# Patient Record
Sex: MALE | Race: BLACK OR AFRICAN AMERICAN | Employment: OTHER | URBAN - METROPOLITAN AREA
[De-identification: names, ages, dates, MRNs, and addresses within clinical notes are randomized per-mention and may not be internally consistent; named-entity substitution may affect disease eponyms.]

---

## 2017-04-18 ENCOUNTER — HOSPITAL ENCOUNTER (OUTPATIENT)
Dept: GENERAL RADIOLOGY | Age: 57
Discharge: HOME | End: 2017-04-18
Attending: INTERNAL MEDICINE | Admitting: INTERNAL MEDICINE

## 2017-04-18 DIAGNOSIS — R13.10 PROBLEMS WITH SWALLOWING AND MASTICATION: ICD-10-CM

## 2017-04-18 DIAGNOSIS — R13.10 DYSPHAGIA: ICD-10-CM

## 2017-04-27 ENCOUNTER — HOSPITAL ENCOUNTER (OUTPATIENT)
Dept: ENDOSCOPY | Age: 57
Discharge: HOME | End: 2017-04-27
Attending: INTERNAL MEDICINE | Admitting: INTERNAL MEDICINE

## 2017-05-15 PROBLEM — C15.5 MALIGNANT NEOPLASM OF LOWER THIRD OF ESOPHAGUS (HCC): Status: ACTIVE | Noted: 2017-05-15

## 2017-05-16 ENCOUNTER — HOSPITAL ENCOUNTER (OUTPATIENT)
Dept: MRI IMAGING | Age: 57
Discharge: HOME | End: 2017-05-16
Attending: INTERNAL MEDICINE | Admitting: INTERNAL MEDICINE

## 2017-05-16 DIAGNOSIS — C15.5 MALIGNANT NEOPLASM OF LOWER THIRD OF ESOPHAGUS (HCC): ICD-10-CM

## 2017-05-16 DIAGNOSIS — K76.9 LIVER DISEASE: ICD-10-CM

## 2017-05-16 DIAGNOSIS — K76.9 HEPATIC LESION: ICD-10-CM

## 2017-05-17 ENCOUNTER — HOSPITAL ENCOUNTER (OUTPATIENT)
Dept: ENDOSCOPY | Age: 57
Discharge: HOME | End: 2017-05-17
Attending: INTERNAL MEDICINE | Admitting: INTERNAL MEDICINE

## 2017-05-17 VITALS
HEART RATE: 67 BPM | TEMPERATURE: 97 F | RESPIRATION RATE: 16 BRPM | SYSTOLIC BLOOD PRESSURE: 126 MMHG | OXYGEN SATURATION: 100 % | DIASTOLIC BLOOD PRESSURE: 76 MMHG

## 2017-05-17 RX ORDER — SODIUM CHLORIDE, SODIUM LACTATE, POTASSIUM CHLORIDE, CALCIUM CHLORIDE 600; 310; 30; 20 MG/100ML; MG/100ML; MG/100ML; MG/100ML
INJECTION, SOLUTION INTRAVENOUS CONTINUOUS
Status: DISCONTINUED | OUTPATIENT
Start: 2017-05-17 | End: 2017-05-17

## 2017-05-17 RX ORDER — SODIUM CHLORIDE 9 MG/ML
INJECTION, SOLUTION INTRAVENOUS CONTINUOUS
Status: DISCONTINUED | OUTPATIENT
Start: 2017-05-17 | End: 2017-05-18 | Stop reason: HOSPADM

## 2017-05-17 RX ORDER — SODIUM CHLORIDE 0.9 % (FLUSH) 0.9 %
10 SYRINGE (ML) INJECTION EVERY 12 HOURS SCHEDULED
Status: DISCONTINUED | OUTPATIENT
Start: 2017-05-17 | End: 2017-05-18 | Stop reason: HOSPADM

## 2017-05-17 RX ORDER — SODIUM CHLORIDE 0.9 % (FLUSH) 0.9 %
10 SYRINGE (ML) INJECTION PRN
Status: DISCONTINUED | OUTPATIENT
Start: 2017-05-17 | End: 2017-05-18 | Stop reason: HOSPADM

## 2017-05-17 ASSESSMENT — PAIN - FUNCTIONAL ASSESSMENT: PAIN_FUNCTIONAL_ASSESSMENT: 0-10

## 2017-06-05 PROBLEM — R11.2 CINV (CHEMOTHERAPY-INDUCED NAUSEA AND VOMITING): Status: ACTIVE | Noted: 2017-06-05

## 2017-06-05 PROBLEM — T45.1X5A CINV (CHEMOTHERAPY-INDUCED NAUSEA AND VOMITING): Status: ACTIVE | Noted: 2017-06-05

## 2017-06-22 PROBLEM — Z51.12 ENCOUNTER FOR ANTINEOPLASTIC CHEMOTHERAPY AND IMMUNOTHERAPY: Status: ACTIVE | Noted: 2017-06-22

## 2017-06-22 PROBLEM — Z51.11 ENCOUNTER FOR ANTINEOPLASTIC CHEMOTHERAPY AND IMMUNOTHERAPY: Status: ACTIVE | Noted: 2017-06-22

## 2017-12-22 ENCOUNTER — HOSPITAL ENCOUNTER (OUTPATIENT)
Dept: CT IMAGING | Age: 57
Discharge: HOME | End: 2017-12-22
Attending: INTERNAL MEDICINE | Admitting: INTERNAL MEDICINE

## 2017-12-22 DIAGNOSIS — C15.5 ADENOCARCINOMA OF LOWER ESOPHAGUS (HCC): ICD-10-CM

## 2017-12-22 DIAGNOSIS — C15.5 MALIGNANT NEOPLASM OF LOWER THIRD OF ESOPHAGUS (HCC): ICD-10-CM

## 2018-01-03 ENCOUNTER — HOSPITAL ENCOUNTER (OUTPATIENT)
Dept: CT IMAGING | Age: 58
Discharge: HOME | End: 2018-01-03
Attending: INTERNAL MEDICINE | Admitting: INTERNAL MEDICINE

## 2018-01-03 VITALS
HEIGHT: 71 IN | BODY MASS INDEX: 21.98 KG/M2 | WEIGHT: 157 LBS | SYSTOLIC BLOOD PRESSURE: 105 MMHG | RESPIRATION RATE: 16 BRPM | TEMPERATURE: 98.7 F | DIASTOLIC BLOOD PRESSURE: 70 MMHG | OXYGEN SATURATION: 95 % | HEART RATE: 61 BPM

## 2018-01-03 DIAGNOSIS — C15.5 MALIGNANT NEOPLASM OF ABDOMINAL ESOPHAGUS (HCC): ICD-10-CM

## 2018-01-03 RX ORDER — FENTANYL CITRATE 50 UG/ML
INJECTION, SOLUTION INTRAMUSCULAR; INTRAVENOUS
Status: COMPLETED | OUTPATIENT
Start: 2018-01-03 | End: 2018-01-03

## 2018-01-03 RX ORDER — MIDAZOLAM HYDROCHLORIDE 1 MG/ML
INJECTION INTRAMUSCULAR; INTRAVENOUS
Status: COMPLETED | OUTPATIENT
Start: 2018-01-03 | End: 2018-01-03

## 2018-01-03 RX ORDER — ACETAMINOPHEN 325 MG/1
650 TABLET ORAL EVERY 4 HOURS PRN
Status: DISCONTINUED | OUTPATIENT
Start: 2018-01-03 | End: 2018-01-04 | Stop reason: HOSPADM

## 2018-01-03 RX ADMIN — FENTANYL CITRATE 50 MCG: 50 INJECTION, SOLUTION INTRAMUSCULAR; INTRAVENOUS at 11:15

## 2018-01-03 RX ADMIN — MIDAZOLAM HYDROCHLORIDE 1 MG: 1 INJECTION INTRAMUSCULAR; INTRAVENOUS at 11:15

## 2018-01-03 RX ADMIN — FENTANYL CITRATE 50 MCG: 50 INJECTION, SOLUTION INTRAMUSCULAR; INTRAVENOUS at 11:19

## 2018-01-03 RX ADMIN — MIDAZOLAM HYDROCHLORIDE 1 MG: 1 INJECTION INTRAMUSCULAR; INTRAVENOUS at 11:19

## 2018-01-03 ASSESSMENT — PAIN - FUNCTIONAL ASSESSMENT: PAIN_FUNCTIONAL_ASSESSMENT: 0-10

## 2018-01-03 NOTE — PROGRESS NOTES
Discharge instructions reviewed and understanding verbalized per pt/family with copy given. All home medications/new prescriptions have been reviewed, questions answered and patient/family state understanding.  Medication information sheet provided for new prescriptions received when applicable

## 2018-01-03 NOTE — PROGRESS NOTES
Patient denies pain/needs, vss, drsg to bx site c/d/i, call placed to Dr. Rosalie Langston regarding patient flying on Friday for a , MD noted that it is ok for patient to fly and that the bx was not in his lung, stated it was a mass next to his, patient and wife notified, patient states he is ready for d/c

## 2018-02-02 ENCOUNTER — PRE-PROCEDURE TELEPHONE (OUTPATIENT)
Dept: INTERVENTIONAL RADIOLOGY/VASCULAR | Age: 58
End: 2018-02-02

## 2018-02-06 ENCOUNTER — HOSPITAL ENCOUNTER (OUTPATIENT)
Dept: NON INVASIVE DIAGNOSTICS | Age: 58
Discharge: HOME | End: 2018-02-06
Attending: NURSE PRACTITIONER | Admitting: NURSE PRACTITIONER

## 2018-02-06 DIAGNOSIS — Z51.11 ENCOUNTER FOR ANTINEOPLASTIC CHEMOTHERAPY: ICD-10-CM

## 2018-02-06 LAB
LV EF: 55 %
LVEF MODALITY: NORMAL

## 2018-02-08 ENCOUNTER — HOSPITAL ENCOUNTER (OUTPATIENT)
Dept: INTERVENTIONAL RADIOLOGY/VASCULAR | Age: 58
Discharge: HOME | End: 2018-02-08
Attending: INTERNAL MEDICINE | Admitting: INTERNAL MEDICINE

## 2018-02-08 VITALS
OXYGEN SATURATION: 99 % | HEART RATE: 72 BPM | RESPIRATION RATE: 12 BRPM | SYSTOLIC BLOOD PRESSURE: 111 MMHG | TEMPERATURE: 98.8 F | DIASTOLIC BLOOD PRESSURE: 63 MMHG

## 2018-02-08 DIAGNOSIS — C15.9 MALIGNANT NEOPLASM OF ESOPHAGUS, UNSPECIFIED LOCATION (HCC): ICD-10-CM

## 2018-02-08 DIAGNOSIS — Z51.11 ENCOUNTER FOR ANTINEOPLASTIC CHEMOTHERAPY: ICD-10-CM

## 2018-02-08 RX ORDER — OXYCODONE HYDROCHLORIDE AND ACETAMINOPHEN 5; 325 MG/1; MG/1
2 TABLET ORAL EVERY 4 HOURS PRN
Status: DISCONTINUED | OUTPATIENT
Start: 2018-02-08 | End: 2018-02-09 | Stop reason: HOSPADM

## 2018-02-08 RX ORDER — MIDAZOLAM HYDROCHLORIDE 1 MG/ML
INJECTION INTRAMUSCULAR; INTRAVENOUS
Status: COMPLETED | OUTPATIENT
Start: 2018-02-08 | End: 2018-02-08

## 2018-02-08 RX ORDER — ONDANSETRON 2 MG/ML
4 INJECTION INTRAMUSCULAR; INTRAVENOUS EVERY 8 HOURS PRN
Status: DISCONTINUED | OUTPATIENT
Start: 2018-02-08 | End: 2018-02-09 | Stop reason: HOSPADM

## 2018-02-08 RX ORDER — ACETAMINOPHEN 325 MG/1
650 TABLET ORAL EVERY 4 HOURS PRN
Status: DISCONTINUED | OUTPATIENT
Start: 2018-02-08 | End: 2018-02-09 | Stop reason: HOSPADM

## 2018-02-08 RX ORDER — OXYCODONE HYDROCHLORIDE AND ACETAMINOPHEN 5; 325 MG/1; MG/1
1 TABLET ORAL EVERY 4 HOURS PRN
Status: DISCONTINUED | OUTPATIENT
Start: 2018-02-08 | End: 2018-02-09 | Stop reason: HOSPADM

## 2018-02-08 RX ORDER — FENTANYL CITRATE 50 UG/ML
INJECTION, SOLUTION INTRAMUSCULAR; INTRAVENOUS
Status: COMPLETED | OUTPATIENT
Start: 2018-02-08 | End: 2018-02-08

## 2018-02-08 RX ADMIN — MIDAZOLAM HYDROCHLORIDE 1 MG: 1 INJECTION INTRAMUSCULAR; INTRAVENOUS at 12:56

## 2018-02-08 RX ADMIN — MIDAZOLAM HYDROCHLORIDE 0.5 MG: 1 INJECTION INTRAMUSCULAR; INTRAVENOUS at 13:02

## 2018-02-08 RX ADMIN — MIDAZOLAM HYDROCHLORIDE 1 MG: 1 INJECTION INTRAMUSCULAR; INTRAVENOUS at 12:52

## 2018-02-08 RX ADMIN — FENTANYL CITRATE 50 MCG: 50 INJECTION, SOLUTION INTRAMUSCULAR; INTRAVENOUS at 12:52

## 2018-02-08 RX ADMIN — FENTANYL CITRATE 25 MCG: 50 INJECTION, SOLUTION INTRAMUSCULAR; INTRAVENOUS at 13:02

## 2018-02-08 RX ADMIN — FENTANYL CITRATE 50 MCG: 50 INJECTION, SOLUTION INTRAMUSCULAR; INTRAVENOUS at 12:56

## 2018-02-08 NOTE — PROGRESS NOTES
Discussed d/c instructions with pt and friend/family at bedside. Verbalized understanding. Provided pt/family with copy of instructions. Pt wants to eat now prior to d/c. Feels weak.

## 2018-02-08 NOTE — PRE SEDATION
been reviewed (see flow sheet for vitals). I have reviewed the patient's history and review of systems.     Mallampati Airway Assessment:  normal    Prior History of Anesthesia Complications:   none    ASA Classification:  Class 1 - A normal healthy patient    Sedation/ Anesthesia Plan:   intravenous sedation    Medications Planned:   midazolam (Versed) intravenously and fentanyl intravenously    Patient is an appropriate candidate for plan of sedation: yes    Electronically signed by Darrell Love MD on 2/8/2018 at 12:48 PM

## 2018-04-06 PROBLEM — K92.0 HEMATEMESIS: Status: ACTIVE | Noted: 2018-04-06

## 2018-06-01 ENCOUNTER — HOSPITAL ENCOUNTER (OUTPATIENT)
Dept: CT IMAGING | Age: 58
Discharge: HOME | End: 2018-06-01
Attending: INTERNAL MEDICINE | Admitting: INTERNAL MEDICINE

## 2018-06-01 DIAGNOSIS — C15.5 MALIGNANT NEOPLASM OF LOWER THIRD OF ESOPHAGUS (HCC): ICD-10-CM

## 2018-06-01 DIAGNOSIS — C15.5 MALIGNANT NEOPLASM OF ABDOMINAL ESOPHAGUS (HCC): ICD-10-CM

## 2018-09-10 ENCOUNTER — HOSPITAL ENCOUNTER (OUTPATIENT)
Dept: CT IMAGING | Age: 58
Discharge: HOME | End: 2018-09-10
Attending: INTERNAL MEDICINE | Admitting: INTERNAL MEDICINE

## 2018-09-10 DIAGNOSIS — Z51.11 ENCOUNTER FOR ANTINEOPLASTIC CHEMOTHERAPY: ICD-10-CM

## 2018-09-10 DIAGNOSIS — C15.9 MALIGNANT NEOPLASM OF ESOPHAGUS, UNSPECIFIED LOCATION (HCC): ICD-10-CM

## 2018-09-10 LAB
LV EF: 55 %
LVEF MODALITY: NORMAL

## 2019-01-23 ENCOUNTER — HOSPITAL ENCOUNTER (OUTPATIENT)
Dept: CT IMAGING | Age: 59
Discharge: HOME | End: 2019-01-23
Payer: COMMERCIAL

## 2019-01-23 DIAGNOSIS — C15.9 MALIGNANT NEOPLASM OF ESOPHAGUS, UNSPECIFIED LOCATION (HCC): ICD-10-CM

## 2019-01-23 PROCEDURE — 74177 CT ABD & PELVIS W/CONTRAST: CPT

## 2019-01-23 PROCEDURE — 6360000004 HC RX CONTRAST MEDICATION: Performed by: INTERNAL MEDICINE

## 2019-01-23 RX ADMIN — IOPAMIDOL 75 ML: 755 INJECTION, SOLUTION INTRAVENOUS at 09:52

## 2019-01-23 RX ADMIN — IOHEXOL 50 ML: 240 INJECTION, SOLUTION INTRATHECAL; INTRAVASCULAR; INTRAVENOUS; ORAL at 09:52

## 2019-01-30 ENCOUNTER — HOSPITAL ENCOUNTER (OUTPATIENT)
Dept: MRI IMAGING | Age: 59
Discharge: HOME | End: 2019-01-30
Payer: COMMERCIAL

## 2019-01-30 DIAGNOSIS — C15.5 MALIGNANT TUMOR OF LOWER THIRD OF ESOPHAGUS (HCC): ICD-10-CM

## 2019-01-30 PROCEDURE — 2580000003 HC RX 258: Performed by: INTERNAL MEDICINE

## 2019-01-30 PROCEDURE — A9579 GAD-BASE MR CONTRAST NOS,1ML: HCPCS | Performed by: INTERNAL MEDICINE

## 2019-01-30 PROCEDURE — 6360000004 HC RX CONTRAST MEDICATION: Performed by: INTERNAL MEDICINE

## 2019-01-30 PROCEDURE — 74183 MRI ABD W/O CNTR FLWD CNTR: CPT

## 2019-01-30 RX ORDER — 0.9 % SODIUM CHLORIDE 0.9 %
10 VIAL (ML) INJECTION
Status: COMPLETED | OUTPATIENT
Start: 2019-01-30 | End: 2019-01-30

## 2019-01-30 RX ADMIN — GADOTERIDOL 14 ML: 279.3 INJECTION, SOLUTION INTRAVENOUS at 16:56

## 2019-01-30 RX ADMIN — Medication 20 ML: at 16:57

## 2019-05-23 ENCOUNTER — HOSPITAL ENCOUNTER (OUTPATIENT)
Dept: CT IMAGING | Age: 59
Discharge: HOME | End: 2019-05-23
Payer: COMMERCIAL

## 2019-05-23 DIAGNOSIS — C15.5 MALIGNANT NEOPLASM OF LOWER THIRD OF ESOPHAGUS (HCC): ICD-10-CM

## 2019-05-23 PROCEDURE — 6360000004 HC RX CONTRAST MEDICATION: Performed by: INTERNAL MEDICINE

## 2019-05-23 PROCEDURE — 74177 CT ABD & PELVIS W/CONTRAST: CPT

## 2019-05-23 RX ADMIN — IOHEXOL 50 ML: 240 INJECTION, SOLUTION INTRATHECAL; INTRAVASCULAR; INTRAVENOUS; ORAL at 15:07

## 2019-05-23 RX ADMIN — IOPAMIDOL 75 ML: 755 INJECTION, SOLUTION INTRAVENOUS at 15:07

## 2019-09-09 ENCOUNTER — HOSPITAL ENCOUNTER (OUTPATIENT)
Dept: NON INVASIVE DIAGNOSTICS | Age: 59
Discharge: HOME | End: 2019-09-09
Payer: COMMERCIAL

## 2019-09-09 LAB
LV EF: 58 %
LVEF MODALITY: NORMAL

## 2019-09-09 PROCEDURE — 93306 TTE W/DOPPLER COMPLETE: CPT

## 2019-12-13 ENCOUNTER — HOSPITAL ENCOUNTER (OUTPATIENT)
Dept: CT IMAGING | Age: 59
Discharge: HOME | End: 2019-12-13
Payer: COMMERCIAL

## 2019-12-13 DIAGNOSIS — C15.5 MALIGNANT NEOPLASM OF LOWER THIRD OF ESOPHAGUS (HCC): ICD-10-CM

## 2019-12-13 PROCEDURE — 74177 CT ABD & PELVIS W/CONTRAST: CPT

## 2019-12-13 PROCEDURE — 6360000004 HC RX CONTRAST MEDICATION: Performed by: INTERNAL MEDICINE

## 2019-12-13 RX ADMIN — IOPAMIDOL 75 ML: 755 INJECTION, SOLUTION INTRAVENOUS at 17:34

## 2019-12-13 RX ADMIN — IOHEXOL 50 ML: 240 INJECTION, SOLUTION INTRATHECAL; INTRAVASCULAR; INTRAVENOUS; ORAL at 17:34

## 2020-06-11 ENCOUNTER — HOSPITAL ENCOUNTER (OUTPATIENT)
Dept: NON INVASIVE DIAGNOSTICS | Age: 60
Discharge: HOME | End: 2020-06-11
Payer: COMMERCIAL

## 2020-06-11 ENCOUNTER — HOSPITAL ENCOUNTER (OUTPATIENT)
Dept: CT IMAGING | Age: 60
Discharge: HOME | End: 2020-06-11
Payer: COMMERCIAL

## 2020-06-11 LAB
A/G RATIO: 1.2 (ref 1.1–2.2)
ALBUMIN SERPL-MCNC: 3 G/DL (ref 3.4–5)
ALP BLD-CCNC: 72 U/L (ref 40–129)
ALT SERPL-CCNC: 15 U/L (ref 10–40)
ANION GAP SERPL CALCULATED.3IONS-SCNC: 8 MMOL/L (ref 3–16)
AST SERPL-CCNC: 29 U/L (ref 15–37)
BILIRUB SERPL-MCNC: <0.2 MG/DL (ref 0–1)
BUN BLDV-MCNC: 13 MG/DL (ref 7–20)
CALCIUM SERPL-MCNC: 6.7 MG/DL (ref 8.3–10.6)
CHLORIDE BLD-SCNC: 113 MMOL/L (ref 99–110)
CO2: 20 MMOL/L (ref 21–32)
CREAT SERPL-MCNC: 1.1 MG/DL (ref 0.8–1.3)
GFR AFRICAN AMERICAN: >60
GFR NON-AFRICAN AMERICAN: >60
GLOBULIN: 2.5 G/DL
GLUCOSE BLD-MCNC: 86 MG/DL (ref 70–99)
LV EF: 60 %
LVEF MODALITY: NORMAL
POTASSIUM SERPL-SCNC: 3.1 MMOL/L (ref 3.5–5.1)
SODIUM BLD-SCNC: 141 MMOL/L (ref 136–145)
TOTAL PROTEIN: 5.5 G/DL (ref 6.4–8.2)

## 2020-06-11 PROCEDURE — 36415 COLL VENOUS BLD VENIPUNCTURE: CPT

## 2020-06-11 PROCEDURE — 93306 TTE W/DOPPLER COMPLETE: CPT

## 2020-06-11 PROCEDURE — 80053 COMPREHEN METABOLIC PANEL: CPT

## 2020-06-11 PROCEDURE — 74177 CT ABD & PELVIS W/CONTRAST: CPT

## 2020-06-11 PROCEDURE — 6360000004 HC RX CONTRAST MEDICATION: Performed by: INTERNAL MEDICINE

## 2020-06-11 RX ADMIN — IOHEXOL 50 ML: 240 INJECTION, SOLUTION INTRATHECAL; INTRAVASCULAR; INTRAVENOUS; ORAL at 17:34

## 2020-06-11 RX ADMIN — IOPAMIDOL 75 ML: 755 INJECTION, SOLUTION INTRAVENOUS at 17:34

## 2020-06-22 ENCOUNTER — TELEPHONE (OUTPATIENT)
Dept: FAMILY MEDICINE CLINIC | Age: 60
End: 2020-06-22

## 2020-06-22 ENCOUNTER — OFFICE VISIT (OUTPATIENT)
Dept: PRIMARY CARE CLINIC | Age: 60
End: 2020-06-22
Payer: COMMERCIAL

## 2020-06-22 VITALS — HEART RATE: 73 BPM | OXYGEN SATURATION: 98 % | TEMPERATURE: 98.3 F

## 2020-06-22 PROCEDURE — 99213 OFFICE O/P EST LOW 20 MIN: CPT | Performed by: NURSE PRACTITIONER

## 2020-06-22 NOTE — TELEPHONE ENCOUNTER
Pt informed to JAGUAR w/PCP  Your patient was seen at the [address] today. A follow up virtual visit with the patient's PCP should be completed in 48 hours. Please schedule the patient for this follow-up. Thank you.

## 2020-06-24 LAB
SARS-COV-2: NOT DETECTED
SOURCE: NORMAL

## 2020-06-25 NOTE — RESULT ENCOUNTER NOTE
Please contact patient with their testing results: Your test for COVID-19, also known as novel coronavirus, came back negative. No virus was detected from the sample collected. Until your symptoms are fully resolved, you may still be contagious. We recommend that you remain isolated for 7 days minimum or 72 hours after your symptoms have completely resolved, whichever is longer. Continually monitor symptoms. Contact a medical provider if symptoms are worsening. If you have any additional questions, contact your PCP.     For additional information, please visit the Centers for Disease Control and Prevention   [email or web address]

## 2020-09-03 RX ORDER — FERROUS SULFATE 325(65) MG
325 TABLET ORAL
COMMUNITY

## 2020-09-03 RX ORDER — SILDENAFIL 100 MG/1
TABLET, FILM COATED ORAL EVERY MORNING
COMMUNITY

## 2020-09-11 NOTE — PROGRESS NOTES
Late entry 9/3/2020    Name_______________________________________Printed:____________________  Date and time of surgery__9/21 1100______________________Arrival Time:__0900______________   1. The instructions given regarding when and if a patient needs to stop oral intake prior to surgery varies. Follow the specific instructions you were given                  __x_Nothing to eat or to drink after Midnight the night before.                             ____Endoscopy patient follow your DRS instructions-generally you will be doing a part of the prep after Midnight                   ____Carbo loading or ERAS instructions will be given to select patients-if you have been given those instructions -please do the following                           The evening before your surgery after dinner before midnight drink 40 ounces of gatorade. If you are diabetic use sugar free. The morning of surgery drink 40 ounces of water. This needs to be finished 3 hours prior to your surgery start time. 2. Take the following pills with a small sip of water on the morning of surgery___________________________________________________                  Do not take blood pressure medications ending in pril or sartan the aranza prior to surgery or the morning of surgery_   3. Aspirin, Ibuprofen, Advil, Naproxen, Vitamin E and other Anti-inflammatory products and supplements should be stopped for 5 -7days before surgery or as directed by your physician. 4. Check with your Doctor regarding stopping Plavix, Coumadin,Eliquis, Lovenox,Effient,Pradaxa,Xarelto, Fragmin or other blood thinners and follow their instructions. 5. Do not smoke, and do not drink any alcoholic beverages 24 hours prior to surgery. This includes NA Beer. Refrain from the usage of any recreational drugs. 6. You may brush your teeth and gargle the morning of surgery. DO NOT SWALLOW WATER   7.  You MUST make arrangements for a responsible adult to stay on site while you are here and take you home after your surgery. You will not be allowed to leave alone or drive yourself home. It is strongly suggested someone stay with you the first 24 hrs. Your surgery will be cancelled if you do not have a ride home. 8. A parent/legal guardian must accompany a child scheduled for surgery and plan to stay at the hospital until the child is discharged. Please do not bring other children with you. 9. Please wear simple, loose fitting clothing to the hospital.  Heena Remy not bring valuables (money, credit cards, checkbooks, etc.) Do not wear any makeup (including no eye makeup) or nail polish on your fingers or toes. 10. DO NOT wear any jewelry or piercings on day of surgery. All body piercing jewelry must be removed. 11. If you have ___dentures, they will be removed before going to the OR; we will provide you a container. If you wear ___contact lenses or ___glasses, they will be removed; please bring a case for them. 12. Please see your family doctor/pediatrician for a history & physical and/or concerning medications. Bring any test results/reports from your physician's office. PCP__________________Phone___________H&P Appt. Date________             13 If you  have a Living Will and Durable Power of  for Healthcare, please bring in a copy. 15. Notify your Surgeon if you develop any illness between now and surgery  time, cough, cold, fever, sore throat, nausea, vomiting, etc.  Please notify your surgeon if you experience dizziness, shortness of breath or blurred vision between now & the time of your surgery             15. DO NOT shave your operative site 96 hours prior to surgery. For face & neck surgery, men may use an electric razor 48 hours prior to surgery. 16. Shower the night before or morning of surgery using an antibacterial soap or as you have been instructed.              17. To provide excellent care visitors will be limited to one in the room at any given time. 18.  Please bring picture ID and insurance card. 19.  Visit our web site for additional information:  [email or web address]              20.During flu season no children under the age of 15 are permitted in the hospital for the safety of all patients. 21. If you take a long acting insulin in the evening only  take half of your usual  dose the night  before your procedure              22. If you use a c-pap please bring DOS if staying overnight,             23.For your convenience [facility] has a pharmacy on site to fill your prescriptions. 24. If you use oxygen and have a portable tank please bring it  with you the DOS             25. Bring a complete list of all your medications with name and dose include any supplements. 26. Other_Patient instructed to get their COVID-19 test done as directed by their doctor (5-7 days prior to procedure)  or patient states will get on __9/15________. Patient was notified that they need to have an appointment,number to call provided. The day the COVID test is done is considered day one. Instructed to self quarantine after test until DOS. __PATs 9/15  _______________________________________   *Please call pre admission testing if you any further questions   [address]    [facility]  [phone]   [address] [place] [address]       All above information reviewed with patient in person or by phone. Patient verbalizes understanding. All questions and concerns addressed.                                                                                                  Patient/Rep__per phone/pt__________________                                                                                                                                    PRE OP INSTRUCTIONS

## 2020-09-15 ENCOUNTER — OFFICE VISIT (OUTPATIENT)
Dept: PRIMARY CARE CLINIC | Age: 60
End: 2020-09-15
Payer: COMMERCIAL

## 2020-09-15 PROCEDURE — 99211 OFF/OP EST MAY X REQ PHY/QHP: CPT | Performed by: NURSE PRACTITIONER

## 2020-09-15 NOTE — PROGRESS NOTES
Darnell Obrien received a viral test for COVID-19. They were educated on isolation and quarantine as appropriate. For any symptoms, they were directed to seek care from their PCP, given contact information to establish with a doctor, directed to an urgent care or the emergency room.

## 2020-09-15 NOTE — PATIENT INSTRUCTIONS

## 2020-09-21 ENCOUNTER — ANESTHESIA EVENT (OUTPATIENT)
Dept: OPERATING ROOM | Age: 60
DRG: 657 | End: 2020-09-21
Payer: COMMERCIAL

## 2020-09-21 ENCOUNTER — HOSPITAL ENCOUNTER (INPATIENT)
Age: 60
LOS: 5 days | Discharge: HOME | DRG: 657 | End: 2020-09-26
Attending: UROLOGY | Admitting: UROLOGY
Payer: COMMERCIAL

## 2020-09-21 ENCOUNTER — ANESTHESIA (OUTPATIENT)
Dept: OPERATING ROOM | Age: 60
DRG: 657 | End: 2020-09-21
Payer: COMMERCIAL

## 2020-09-21 VITALS
DIASTOLIC BLOOD PRESSURE: 78 MMHG | SYSTOLIC BLOOD PRESSURE: 153 MMHG | OXYGEN SATURATION: 100 % | RESPIRATION RATE: 19 BRPM

## 2020-09-21 PROBLEM — N28.89 RENAL MASS: Status: ACTIVE | Noted: 2020-09-21

## 2020-09-21 LAB
ABO/RH: NORMAL
ANTIBODY SCREEN: NORMAL

## 2020-09-21 PROCEDURE — 6360000002 HC RX W HCPCS: Performed by: ANESTHESIOLOGY

## 2020-09-21 PROCEDURE — 3600000019 HC SURGERY ROBOT ADDTL 15MIN: Performed by: UROLOGY

## 2020-09-21 PROCEDURE — 6360000002 HC RX W HCPCS: Performed by: NURSE ANESTHETIST, CERTIFIED REGISTERED

## 2020-09-21 PROCEDURE — 0TT04ZZ RESECTION OF RIGHT KIDNEY, PERCUTANEOUS ENDOSCOPIC APPROACH: ICD-10-PCS | Performed by: UROLOGY

## 2020-09-21 PROCEDURE — 88307 TISSUE EXAM BY PATHOLOGIST: CPT

## 2020-09-21 PROCEDURE — 2500000003 HC RX 250 WO HCPCS: Performed by: NURSE ANESTHETIST, CERTIFIED REGISTERED

## 2020-09-21 PROCEDURE — 2500000003 HC RX 250 WO HCPCS: Performed by: UROLOGY

## 2020-09-21 PROCEDURE — 86850 RBC ANTIBODY SCREEN: CPT

## 2020-09-21 PROCEDURE — 6360000002 HC RX W HCPCS: Performed by: UROLOGY

## 2020-09-21 PROCEDURE — 94760 N-INVAS EAR/PLS OXIMETRY 1: CPT

## 2020-09-21 PROCEDURE — 3700000000 HC ANESTHESIA ATTENDED CARE: Performed by: UROLOGY

## 2020-09-21 PROCEDURE — 6360000002 HC RX W HCPCS

## 2020-09-21 PROCEDURE — 3600000009 HC SURGERY ROBOT BASE: Performed by: UROLOGY

## 2020-09-21 PROCEDURE — 88341 IMHCHEM/IMCYTCHM EA ADD ANTB: CPT

## 2020-09-21 PROCEDURE — 36415 COLL VENOUS BLD VENIPUNCTURE: CPT

## 2020-09-21 PROCEDURE — G0378 HOSPITAL OBSERVATION PER HR: HCPCS

## 2020-09-21 PROCEDURE — 3700000001 HC ADD 15 MINUTES (ANESTHESIA): Performed by: UROLOGY

## 2020-09-21 PROCEDURE — 7100000000 HC PACU RECOVERY - FIRST 15 MIN: Performed by: UROLOGY

## 2020-09-21 PROCEDURE — 2720000010 HC SURG SUPPLY STERILE: Performed by: UROLOGY

## 2020-09-21 PROCEDURE — 86900 BLOOD TYPING SEROLOGIC ABO: CPT

## 2020-09-21 PROCEDURE — 2580000003 HC RX 258: Performed by: NURSE ANESTHETIST, CERTIFIED REGISTERED

## 2020-09-21 PROCEDURE — 2580000003 HC RX 258: Performed by: UROLOGY

## 2020-09-21 PROCEDURE — 1200000000 HC SEMI PRIVATE

## 2020-09-21 PROCEDURE — 88342 IMHCHEM/IMCYTCHM 1ST ANTB: CPT

## 2020-09-21 PROCEDURE — S2900 ROBOTIC SURGICAL SYSTEM: HCPCS | Performed by: UROLOGY

## 2020-09-21 PROCEDURE — 7100000001 HC PACU RECOVERY - ADDTL 15 MIN: Performed by: UROLOGY

## 2020-09-21 PROCEDURE — 86901 BLOOD TYPING SEROLOGIC RH(D): CPT

## 2020-09-21 PROCEDURE — 8E0W4CZ ROBOTIC ASSISTED PROCEDURE OF TRUNK REGION, PERCUTANEOUS ENDOSCOPIC APPROACH: ICD-10-PCS | Performed by: UROLOGY

## 2020-09-21 PROCEDURE — 6370000000 HC RX 637 (ALT 250 FOR IP): Performed by: ANESTHESIOLOGY

## 2020-09-21 PROCEDURE — 6370000000 HC RX 637 (ALT 250 FOR IP): Performed by: UROLOGY

## 2020-09-21 PROCEDURE — 2709999900 HC NON-CHARGEABLE SUPPLY: Performed by: UROLOGY

## 2020-09-21 PROCEDURE — 6370000000 HC RX 637 (ALT 250 FOR IP)

## 2020-09-21 RX ORDER — ONDANSETRON 2 MG/ML
4 INJECTION INTRAMUSCULAR; INTRAVENOUS EVERY 6 HOURS PRN
Status: DISCONTINUED | OUTPATIENT
Start: 2020-09-21 | End: 2020-09-26 | Stop reason: HOSPADM

## 2020-09-21 RX ORDER — MORPHINE SULFATE 4 MG/ML
4 INJECTION, SOLUTION INTRAMUSCULAR; INTRAVENOUS
Status: DISCONTINUED | OUTPATIENT
Start: 2020-09-21 | End: 2020-09-26 | Stop reason: HOSPADM

## 2020-09-21 RX ORDER — HYDROMORPHONE HCL 110MG/55ML
PATIENT CONTROLLED ANALGESIA SYRINGE INTRAVENOUS PRN
Status: DISCONTINUED | OUTPATIENT
Start: 2020-09-21 | End: 2020-09-21 | Stop reason: SDUPTHER

## 2020-09-21 RX ORDER — PROMETHAZINE HYDROCHLORIDE 25 MG/1
12.5 TABLET ORAL EVERY 6 HOURS PRN
Status: DISCONTINUED | OUTPATIENT
Start: 2020-09-21 | End: 2020-09-26 | Stop reason: HOSPADM

## 2020-09-21 RX ORDER — SENNA AND DOCUSATE SODIUM 50; 8.6 MG/1; MG/1
1 TABLET, FILM COATED ORAL 2 TIMES DAILY
Status: DISCONTINUED | OUTPATIENT
Start: 2020-09-21 | End: 2020-09-26 | Stop reason: HOSPADM

## 2020-09-21 RX ORDER — SODIUM CHLORIDE 0.9 % (FLUSH) 0.9 %
10 SYRINGE (ML) INJECTION EVERY 12 HOURS SCHEDULED
Status: DISCONTINUED | OUTPATIENT
Start: 2020-09-21 | End: 2020-09-26 | Stop reason: HOSPADM

## 2020-09-21 RX ORDER — HYDRALAZINE HYDROCHLORIDE 20 MG/ML
5 INJECTION INTRAMUSCULAR; INTRAVENOUS ONCE
Status: COMPLETED | OUTPATIENT
Start: 2020-09-21 | End: 2020-09-21

## 2020-09-21 RX ORDER — MIDAZOLAM HYDROCHLORIDE 1 MG/ML
INJECTION INTRAMUSCULAR; INTRAVENOUS PRN
Status: DISCONTINUED | OUTPATIENT
Start: 2020-09-21 | End: 2020-09-21 | Stop reason: SDUPTHER

## 2020-09-21 RX ORDER — ACETAMINOPHEN 325 MG/1
650 TABLET ORAL EVERY 4 HOURS PRN
Status: DISCONTINUED | OUTPATIENT
Start: 2020-09-21 | End: 2020-09-21 | Stop reason: HOSPADM

## 2020-09-21 RX ORDER — LIDOCAINE HYDROCHLORIDE 10 MG/ML
1 INJECTION, SOLUTION EPIDURAL; INFILTRATION; INTRACAUDAL; PERINEURAL
Status: DISCONTINUED | OUTPATIENT
Start: 2020-09-21 | End: 2020-09-21 | Stop reason: HOSPADM

## 2020-09-21 RX ORDER — PROCHLORPERAZINE MALEATE 5 MG/1
5 TABLET ORAL EVERY 6 HOURS PRN
Status: DISCONTINUED | OUTPATIENT
Start: 2020-09-21 | End: 2020-09-21 | Stop reason: ALTCHOICE

## 2020-09-21 RX ORDER — ACETAMINOPHEN 325 MG/1
650 TABLET ORAL EVERY 6 HOURS
Status: DISCONTINUED | OUTPATIENT
Start: 2020-09-21 | End: 2020-09-26 | Stop reason: HOSPADM

## 2020-09-21 RX ORDER — METOCLOPRAMIDE 10 MG/1
10 TABLET ORAL
Status: DISCONTINUED | OUTPATIENT
Start: 2020-09-21 | End: 2020-09-21 | Stop reason: ALTCHOICE

## 2020-09-21 RX ORDER — ONDANSETRON 2 MG/ML
INJECTION INTRAMUSCULAR; INTRAVENOUS PRN
Status: DISCONTINUED | OUTPATIENT
Start: 2020-09-21 | End: 2020-09-21 | Stop reason: SDUPTHER

## 2020-09-21 RX ORDER — SODIUM CHLORIDE 9 MG/ML
INJECTION, SOLUTION INTRAVENOUS CONTINUOUS
Status: DISCONTINUED | OUTPATIENT
Start: 2020-09-21 | End: 2020-09-21

## 2020-09-21 RX ORDER — MAGNESIUM HYDROXIDE 1200 MG/15ML
LIQUID ORAL CONTINUOUS PRN
Status: COMPLETED | OUTPATIENT
Start: 2020-09-21 | End: 2020-09-21

## 2020-09-21 RX ORDER — HYDROCODONE BITARTRATE AND ACETAMINOPHEN 5; 325 MG/1; MG/1
1 TABLET ORAL
Status: DISCONTINUED | OUTPATIENT
Start: 2020-09-21 | End: 2020-09-21 | Stop reason: HOSPADM

## 2020-09-21 RX ORDER — GLYCOPYRROLATE 0.2 MG/ML
INJECTION INTRAMUSCULAR; INTRAVENOUS PRN
Status: DISCONTINUED | OUTPATIENT
Start: 2020-09-21 | End: 2020-09-21 | Stop reason: SDUPTHER

## 2020-09-21 RX ORDER — ATORVASTATIN CALCIUM 20 MG/1
20 TABLET, FILM COATED ORAL NIGHTLY
Status: DISCONTINUED | OUTPATIENT
Start: 2020-09-21 | End: 2020-09-26 | Stop reason: HOSPADM

## 2020-09-21 RX ORDER — DEXAMETHASONE SODIUM PHOSPHATE 4 MG/ML
INJECTION, SOLUTION INTRA-ARTICULAR; INTRALESIONAL; INTRAMUSCULAR; INTRAVENOUS; SOFT TISSUE PRN
Status: DISCONTINUED | OUTPATIENT
Start: 2020-09-21 | End: 2020-09-21 | Stop reason: SDUPTHER

## 2020-09-21 RX ORDER — MAGNESIUM SULFATE HEPTAHYDRATE 500 MG/ML
INJECTION, SOLUTION INTRAMUSCULAR; INTRAVENOUS PRN
Status: DISCONTINUED | OUTPATIENT
Start: 2020-09-21 | End: 2020-09-21 | Stop reason: SDUPTHER

## 2020-09-21 RX ORDER — SODIUM CHLORIDE, SODIUM LACTATE, POTASSIUM CHLORIDE, CALCIUM CHLORIDE 600; 310; 30; 20 MG/100ML; MG/100ML; MG/100ML; MG/100ML
INJECTION, SOLUTION INTRAVENOUS CONTINUOUS PRN
Status: COMPLETED | OUTPATIENT
Start: 2020-09-21 | End: 2020-09-21

## 2020-09-21 RX ORDER — SODIUM CHLORIDE 9 MG/ML
INJECTION, SOLUTION INTRAVENOUS CONTINUOUS
Status: DISCONTINUED | OUTPATIENT
Start: 2020-09-21 | End: 2020-09-26

## 2020-09-21 RX ORDER — PROPOFOL 10 MG/ML
INJECTION, EMULSION INTRAVENOUS PRN
Status: DISCONTINUED | OUTPATIENT
Start: 2020-09-21 | End: 2020-09-21 | Stop reason: SDUPTHER

## 2020-09-21 RX ORDER — LIDOCAINE HYDROCHLORIDE 10 MG/ML
0.5 INJECTION, SOLUTION EPIDURAL; INFILTRATION; INTRACAUDAL; PERINEURAL ONCE
Status: DISCONTINUED | OUTPATIENT
Start: 2020-09-21 | End: 2020-09-21 | Stop reason: HOSPADM

## 2020-09-21 RX ORDER — VECURONIUM BROMIDE 1 MG/ML
INJECTION, POWDER, LYOPHILIZED, FOR SOLUTION INTRAVENOUS PRN
Status: DISCONTINUED | OUTPATIENT
Start: 2020-09-21 | End: 2020-09-21 | Stop reason: SDUPTHER

## 2020-09-21 RX ORDER — OXYCODONE HYDROCHLORIDE AND ACETAMINOPHEN 5; 325 MG/1; MG/1
1 TABLET ORAL EVERY 6 HOURS PRN
Qty: 20 TABLET | Refills: 0 | Status: SHIPPED | OUTPATIENT
Start: 2020-09-21 | End: 2020-09-26

## 2020-09-21 RX ORDER — BUPIVACAINE HYDROCHLORIDE AND EPINEPHRINE 5; 5 MG/ML; UG/ML
INJECTION, SOLUTION EPIDURAL; INTRACAUDAL; PERINEURAL
Status: COMPLETED | OUTPATIENT
Start: 2020-09-21 | End: 2020-09-21

## 2020-09-21 RX ORDER — OXYCODONE HYDROCHLORIDE 5 MG/1
5 TABLET ORAL EVERY 4 HOURS PRN
Status: DISCONTINUED | OUTPATIENT
Start: 2020-09-21 | End: 2020-09-26 | Stop reason: HOSPADM

## 2020-09-21 RX ORDER — SUCCINYLCHOLINE CHLORIDE 20 MG/ML
INJECTION INTRAMUSCULAR; INTRAVENOUS PRN
Status: DISCONTINUED | OUTPATIENT
Start: 2020-09-21 | End: 2020-09-21 | Stop reason: SDUPTHER

## 2020-09-21 RX ORDER — LIDOCAINE HYDROCHLORIDE 20 MG/ML
INJECTION, SOLUTION EPIDURAL; INFILTRATION; INTRACAUDAL; PERINEURAL PRN
Status: DISCONTINUED | OUTPATIENT
Start: 2020-09-21 | End: 2020-09-21 | Stop reason: SDUPTHER

## 2020-09-21 RX ORDER — OXYCODONE HYDROCHLORIDE 5 MG/1
10 TABLET ORAL EVERY 4 HOURS PRN
Status: DISCONTINUED | OUTPATIENT
Start: 2020-09-21 | End: 2020-09-26 | Stop reason: HOSPADM

## 2020-09-21 RX ORDER — MORPHINE SULFATE 2 MG/ML
2 INJECTION, SOLUTION INTRAMUSCULAR; INTRAVENOUS
Status: DISCONTINUED | OUTPATIENT
Start: 2020-09-21 | End: 2020-09-26 | Stop reason: HOSPADM

## 2020-09-21 RX ORDER — SODIUM CHLORIDE, SODIUM LACTATE, POTASSIUM CHLORIDE, CALCIUM CHLORIDE 600; 310; 30; 20 MG/100ML; MG/100ML; MG/100ML; MG/100ML
INJECTION, SOLUTION INTRAVENOUS CONTINUOUS
Status: DISCONTINUED | OUTPATIENT
Start: 2020-09-21 | End: 2020-09-21

## 2020-09-21 RX ORDER — HYDROMORPHONE HCL 110MG/55ML
0.25 PATIENT CONTROLLED ANALGESIA SYRINGE INTRAVENOUS EVERY 5 MIN PRN
Status: DISCONTINUED | OUTPATIENT
Start: 2020-09-21 | End: 2020-09-21 | Stop reason: HOSPADM

## 2020-09-21 RX ORDER — ONDANSETRON 2 MG/ML
4 INJECTION INTRAMUSCULAR; INTRAVENOUS
Status: DISCONTINUED | OUTPATIENT
Start: 2020-09-21 | End: 2020-09-21 | Stop reason: HOSPADM

## 2020-09-21 RX ORDER — SODIUM CHLORIDE, SODIUM LACTATE, POTASSIUM CHLORIDE, CALCIUM CHLORIDE 600; 310; 30; 20 MG/100ML; MG/100ML; MG/100ML; MG/100ML
INJECTION, SOLUTION INTRAVENOUS CONTINUOUS PRN
Status: DISCONTINUED | OUTPATIENT
Start: 2020-09-21 | End: 2020-09-21 | Stop reason: SDUPTHER

## 2020-09-21 RX ORDER — PHENYLEPHRINE HCL IN 0.9% NACL 1 MG/10 ML
SYRINGE (ML) INTRAVENOUS PRN
Status: DISCONTINUED | OUTPATIENT
Start: 2020-09-21 | End: 2020-09-21 | Stop reason: SDUPTHER

## 2020-09-21 RX ORDER — AMOXICILLIN 250 MG
1 CAPSULE ORAL 2 TIMES DAILY
Qty: 50 TABLET | Refills: 0 | Status: SHIPPED | OUTPATIENT
Start: 2020-09-21 | End: 2021-06-07

## 2020-09-21 RX ORDER — ACETAMINOPHEN 325 MG/1
TABLET ORAL
Status: DISPENSED
Start: 2020-09-21 | End: 2020-09-21

## 2020-09-21 RX ORDER — SODIUM CHLORIDE 0.9 % (FLUSH) 0.9 %
10 SYRINGE (ML) INJECTION PRN
Status: DISCONTINUED | OUTPATIENT
Start: 2020-09-21 | End: 2020-09-26 | Stop reason: HOSPADM

## 2020-09-21 RX ORDER — FENTANYL CITRATE 50 UG/ML
INJECTION, SOLUTION INTRAMUSCULAR; INTRAVENOUS PRN
Status: DISCONTINUED | OUTPATIENT
Start: 2020-09-21 | End: 2020-09-21 | Stop reason: SDUPTHER

## 2020-09-21 RX ORDER — FENTANYL CITRATE 50 UG/ML
50 INJECTION, SOLUTION INTRAMUSCULAR; INTRAVENOUS EVERY 5 MIN PRN
Status: DISCONTINUED | OUTPATIENT
Start: 2020-09-21 | End: 2020-09-21 | Stop reason: HOSPADM

## 2020-09-21 RX ORDER — FENTANYL CITRATE 50 UG/ML
INJECTION, SOLUTION INTRAMUSCULAR; INTRAVENOUS
Status: COMPLETED
Start: 2020-09-21 | End: 2020-09-21

## 2020-09-21 RX ORDER — HYDROMORPHONE HCL 110MG/55ML
0.5 PATIENT CONTROLLED ANALGESIA SYRINGE INTRAVENOUS EVERY 5 MIN PRN
Status: COMPLETED | OUTPATIENT
Start: 2020-09-21 | End: 2020-09-21

## 2020-09-21 RX ADMIN — ACETAMINOPHEN 650 MG: 325 TABLET ORAL at 23:49

## 2020-09-21 RX ADMIN — HYDROMORPHONE HYDROCHLORIDE 0.5 MG: 2 INJECTION, SOLUTION INTRAMUSCULAR; INTRAVENOUS; SUBCUTANEOUS at 14:26

## 2020-09-21 RX ADMIN — SUGAMMADEX 100 MG: 100 INJECTION, SOLUTION INTRAVENOUS at 13:43

## 2020-09-21 RX ADMIN — HYDROMORPHONE HYDROCHLORIDE 0.5 MG: 2 INJECTION, SOLUTION INTRAMUSCULAR; INTRAVENOUS; SUBCUTANEOUS at 15:02

## 2020-09-21 RX ADMIN — PROMETHAZINE HYDROCHLORIDE 12.5 MG: 25 TABLET ORAL at 21:59

## 2020-09-21 RX ADMIN — MORPHINE SULFATE 4 MG: 4 INJECTION INTRAVENOUS at 17:13

## 2020-09-21 RX ADMIN — FENTANYL CITRATE 25 MCG: 50 INJECTION, SOLUTION INTRAMUSCULAR; INTRAVENOUS at 15:45

## 2020-09-21 RX ADMIN — HYDROMORPHONE HYDROCHLORIDE 0.5 MG: 2 INJECTION, SOLUTION INTRAMUSCULAR; INTRAVENOUS; SUBCUTANEOUS at 14:02

## 2020-09-21 RX ADMIN — HYDROMORPHONE HYDROCHLORIDE 0.5 MG: 2 INJECTION, SOLUTION INTRAMUSCULAR; INTRAVENOUS; SUBCUTANEOUS at 14:46

## 2020-09-21 RX ADMIN — HYDRALAZINE HYDROCHLORIDE 5 MG: 20 INJECTION INTRAMUSCULAR; INTRAVENOUS at 15:00

## 2020-09-21 RX ADMIN — SODIUM CHLORIDE, POTASSIUM CHLORIDE, SODIUM LACTATE AND CALCIUM CHLORIDE: 600; 310; 30; 20 INJECTION, SOLUTION INTRAVENOUS at 11:10

## 2020-09-21 RX ADMIN — DEXAMETHASONE SODIUM PHOSPHATE 8 MG: 4 INJECTION, SOLUTION INTRAMUSCULAR; INTRAVENOUS at 11:21

## 2020-09-21 RX ADMIN — Medication 100 MCG: at 11:35

## 2020-09-21 RX ADMIN — VECURONIUM BROMIDE 10 MG: 1 INJECTION, POWDER, LYOPHILIZED, FOR SOLUTION INTRAVENOUS at 12:51

## 2020-09-21 RX ADMIN — PHENYLEPHRINE HYDROCHLORIDE 100 MCG: 10 INJECTION INTRAVENOUS at 11:19

## 2020-09-21 RX ADMIN — ACETAMINOPHEN 650 MG: 325 TABLET ORAL at 10:31

## 2020-09-21 RX ADMIN — STANDARDIZED SENNA CONCENTRATE AND DOCUSATE SODIUM 1 TABLET: 8.6; 5 TABLET ORAL at 23:49

## 2020-09-21 RX ADMIN — FENTANYL CITRATE 50 MCG: 50 INJECTION, SOLUTION INTRAMUSCULAR; INTRAVENOUS at 11:10

## 2020-09-21 RX ADMIN — CEFAZOLIN SODIUM 2 G: 10 INJECTION, POWDER, FOR SOLUTION INTRAVENOUS at 21:59

## 2020-09-21 RX ADMIN — MAGNESIUM SULFATE HEPTAHYDRATE 1 G: 500 INJECTION, SOLUTION INTRAMUSCULAR; INTRAVENOUS at 11:57

## 2020-09-21 RX ADMIN — GLYCOPYRROLATE 0.2 MG: 0.2 INJECTION INTRAMUSCULAR; INTRAVENOUS at 11:18

## 2020-09-21 RX ADMIN — SODIUM CHLORIDE: 9 INJECTION, SOLUTION INTRAVENOUS at 18:30

## 2020-09-21 RX ADMIN — HYDROMORPHONE HYDROCHLORIDE 0.5 MG: 2 INJECTION, SOLUTION INTRAMUSCULAR; INTRAVENOUS; SUBCUTANEOUS at 13:23

## 2020-09-21 RX ADMIN — MIDAZOLAM 2 MG: 1 INJECTION INTRAMUSCULAR; INTRAVENOUS at 11:10

## 2020-09-21 RX ADMIN — CEFAZOLIN SODIUM 2 G: 10 INJECTION, POWDER, FOR SOLUTION INTRAVENOUS at 11:05

## 2020-09-21 RX ADMIN — SUCCINYLCHOLINE CHLORIDE 140 MG: 20 INJECTION, SOLUTION INTRAMUSCULAR; INTRAVENOUS at 11:14

## 2020-09-21 RX ADMIN — OXYCODONE 10 MG: 5 TABLET ORAL at 23:50

## 2020-09-21 RX ADMIN — LIDOCAINE HYDROCHLORIDE 100 MG: 20 INJECTION, SOLUTION EPIDURAL; INFILTRATION; INTRACAUDAL; PERINEURAL at 11:13

## 2020-09-21 RX ADMIN — ATORVASTATIN CALCIUM 20 MG: 20 TABLET, FILM COATED ORAL at 23:49

## 2020-09-21 RX ADMIN — HYDROMORPHONE HYDROCHLORIDE 0.5 MG: 2 INJECTION, SOLUTION INTRAMUSCULAR; INTRAVENOUS; SUBCUTANEOUS at 14:37

## 2020-09-21 RX ADMIN — SODIUM CHLORIDE, POTASSIUM CHLORIDE, SODIUM LACTATE AND CALCIUM CHLORIDE: 600; 310; 30; 20 INJECTION, SOLUTION INTRAVENOUS at 12:21

## 2020-09-21 RX ADMIN — PROPOFOL 200 MG: 10 INJECTION, EMULSION INTRAVENOUS at 11:14

## 2020-09-21 RX ADMIN — Medication 100 MCG: at 11:28

## 2020-09-21 RX ADMIN — VECURONIUM BROMIDE 10 MG: 1 INJECTION, POWDER, LYOPHILIZED, FOR SOLUTION INTRAVENOUS at 11:22

## 2020-09-21 RX ADMIN — ONDANSETRON 4 MG: 2 INJECTION INTRAMUSCULAR; INTRAVENOUS at 11:25

## 2020-09-21 RX ADMIN — ONDANSETRON 4 MG: 2 INJECTION INTRAMUSCULAR; INTRAVENOUS at 18:28

## 2020-09-21 ASSESSMENT — PAIN SCALES - GENERAL
PAINLEVEL_OUTOF10: 8
PAINLEVEL_OUTOF10: 8
PAINLEVEL_OUTOF10: 7
PAINLEVEL_OUTOF10: 0
PAINLEVEL_OUTOF10: 0
PAINLEVEL_OUTOF10: 5
PAINLEVEL_OUTOF10: 7
PAINLEVEL_OUTOF10: 5
PAINLEVEL_OUTOF10: 8

## 2020-09-21 ASSESSMENT — PULMONARY FUNCTION TESTS
PIF_VALUE: 17
PIF_VALUE: 16
PIF_VALUE: 16
PIF_VALUE: 23
PIF_VALUE: 15
PIF_VALUE: 24
PIF_VALUE: 16
PIF_VALUE: 19
PIF_VALUE: 2
PIF_VALUE: 16
PIF_VALUE: 24
PIF_VALUE: 19
PIF_VALUE: 23
PIF_VALUE: 22
PIF_VALUE: 22
PIF_VALUE: 17
PIF_VALUE: 17
PIF_VALUE: 23
PIF_VALUE: 22
PIF_VALUE: 19
PIF_VALUE: 22
PIF_VALUE: 23
PIF_VALUE: 25
PIF_VALUE: 1
PIF_VALUE: 23
PIF_VALUE: 22
PIF_VALUE: 17
PIF_VALUE: 18
PIF_VALUE: 13
PIF_VALUE: 22
PIF_VALUE: 24
PIF_VALUE: 15
PIF_VALUE: 26
PIF_VALUE: 15
PIF_VALUE: 1
PIF_VALUE: 16
PIF_VALUE: 16
PIF_VALUE: 24
PIF_VALUE: 23
PIF_VALUE: 16
PIF_VALUE: 17
PIF_VALUE: 23
PIF_VALUE: 22
PIF_VALUE: 17
PIF_VALUE: 23
PIF_VALUE: 25
PIF_VALUE: 1
PIF_VALUE: 3
PIF_VALUE: 19
PIF_VALUE: 24
PIF_VALUE: 24
PIF_VALUE: 22
PIF_VALUE: 2
PIF_VALUE: 22
PIF_VALUE: 22
PIF_VALUE: 25
PIF_VALUE: 18
PIF_VALUE: 23
PIF_VALUE: 24
PIF_VALUE: 18
PIF_VALUE: 16
PIF_VALUE: 23
PIF_VALUE: 22
PIF_VALUE: 24
PIF_VALUE: 14
PIF_VALUE: 20
PIF_VALUE: 17
PIF_VALUE: 22
PIF_VALUE: 23
PIF_VALUE: 18
PIF_VALUE: 22
PIF_VALUE: 22
PIF_VALUE: 24
PIF_VALUE: 23
PIF_VALUE: 23
PIF_VALUE: 1
PIF_VALUE: 25
PIF_VALUE: 6
PIF_VALUE: 18
PIF_VALUE: 24
PIF_VALUE: 23
PIF_VALUE: 15
PIF_VALUE: 25
PIF_VALUE: 24
PIF_VALUE: 22
PIF_VALUE: 23
PIF_VALUE: 1
PIF_VALUE: 23
PIF_VALUE: 2
PIF_VALUE: 23
PIF_VALUE: 3
PIF_VALUE: 1
PIF_VALUE: 23
PIF_VALUE: 16
PIF_VALUE: 23
PIF_VALUE: 6
PIF_VALUE: 23
PIF_VALUE: 23
PIF_VALUE: 16
PIF_VALUE: 23
PIF_VALUE: 24
PIF_VALUE: 16
PIF_VALUE: 18
PIF_VALUE: 17
PIF_VALUE: 23
PIF_VALUE: 16
PIF_VALUE: 16
PIF_VALUE: 20
PIF_VALUE: 23
PIF_VALUE: 23
PIF_VALUE: 1
PIF_VALUE: 18
PIF_VALUE: 23
PIF_VALUE: 23
PIF_VALUE: 24
PIF_VALUE: 0
PIF_VALUE: 24
PIF_VALUE: 25
PIF_VALUE: 18
PIF_VALUE: 23
PIF_VALUE: 19
PIF_VALUE: 10
PIF_VALUE: 17
PIF_VALUE: 16
PIF_VALUE: 24
PIF_VALUE: 24
PIF_VALUE: 23
PIF_VALUE: 23
PIF_VALUE: 15
PIF_VALUE: 24
PIF_VALUE: 19
PIF_VALUE: 23
PIF_VALUE: 16
PIF_VALUE: 23
PIF_VALUE: 16
PIF_VALUE: 23
PIF_VALUE: 19
PIF_VALUE: 24
PIF_VALUE: 18
PIF_VALUE: 19
PIF_VALUE: 14
PIF_VALUE: 18
PIF_VALUE: 0
PIF_VALUE: 17
PIF_VALUE: 22
PIF_VALUE: 20
PIF_VALUE: 17
PIF_VALUE: 15
PIF_VALUE: 24
PIF_VALUE: 26
PIF_VALUE: 23
PIF_VALUE: 22
PIF_VALUE: 23
PIF_VALUE: 25
PIF_VALUE: 1
PIF_VALUE: 23
PIF_VALUE: 17
PIF_VALUE: 16
PIF_VALUE: 15
PIF_VALUE: 23
PIF_VALUE: 6
PIF_VALUE: 23
PIF_VALUE: 21
PIF_VALUE: 23
PIF_VALUE: 18

## 2020-09-21 ASSESSMENT — PAIN DESCRIPTION - PAIN TYPE
TYPE: SURGICAL PAIN

## 2020-09-21 ASSESSMENT — PAIN DESCRIPTION - LOCATION
LOCATION: ABDOMEN

## 2020-09-21 ASSESSMENT — PAIN DESCRIPTION - DESCRIPTORS: DESCRIPTORS: SORE

## 2020-09-21 NOTE — PROGRESS NOTES
Patient states his pain is tolerable after IV dilaudid, BP continues to be elevated, 174/81, hydralazine given.

## 2020-09-21 NOTE — PROGRESS NOTES
Patient transferred from OR to PACU, arouses to physical stimuli, oral airway removed, hypertensive, dilaudid given by CRNA, abdominal incisions well approximated x5, perez draining clear yellow urine, will monitor.

## 2020-09-21 NOTE — OP NOTE
Urology Operative Report  [facility]    Provider: Heide Piña MD Patient ID:  Admission Date: 2020 Name: Carrie Bird  OR Date: 2020  MRN: [id number]   Patient Location: OR/NONE : 1960  Attending: Heide Piña MD Date of Service: 2020  PCP: Joe Thomas MD     Date of Operation: 2020     Preoperative Diagnosis: renal mass on the RIGHT kidney    Postoperative Diagnosis: same    Procedure:    1. Robotic assisted laparoscopic radical nephrectomy on the RIGHT  2. Layered closure of the abdominal wall    Surgeon:   Heide Piña MD    Anesthesia: General endotracheal anesthesia    Indications: Carrie Bird is a 63 y.o. male who presents for the above named surgery. Informed consent was obtained and the risks, benefits, and details of the procedure were explained to the patient who elected to proceed. Details of Procedure:  After informed consent was obtained, making the patient aware of the risks, benefits and alternatives to the procedure, he was taken back to the surgical suite and positioned in a supine position on the surgical table. SCDs were placed on the lower extremities. General anesthesia was induced, A Chávez catheter was placed, and he was transferred to a left lateral decubitus position. All pressure points were carefully padded. A 12mm incision was made in the right side paramedian location, superior and lateral to the umbilicus. A veress needle was introduced into the abdomen in two attempts and insufflation was obtained. A 8mm trocar was used to enter the abdomen. Minimal adhesions were seen, a large liver was seen and no significant injury was noted. It did appear that the first varress attempt likely nicked the liver but only small bruising was seen, no active bleeding.  After inspecting the abdomen sufficiently we placed 2 robotic 8 mm trocars in the right lower quadrant and one 8mm trocar in the right upper quadrant, triangulated inferior incisions were extended to allow extraction of the kidney. This incision was closed first closing peritoneum with running 3-0 vicryl, then 0-looped PDS to close the fascia. 4-0 running Monocryl was used to approximate the skin edges and Dermabond was applied to all wounds. At the end of the procedure all needle, lap, instrument and sponge counts were correct. The patient tolerated the procedure well, was extubated without issue in the operating room, and was transported to the PACU in stable condition. Findings: There did appear to be an adequate surgical margin. Estimated Blood Loss: Approximately 50 mL                  Drains: Chávez catheter    Specimens: Right kidney and proximal ureter    Complications: none apparent           Disposition:  PACU - hemodynamically stable.             Alfa Kraus MD  9/21/2020

## 2020-09-21 NOTE — H&P
The history and physical was reviewed. The patient was seen and examined in pre-op and his RIGHT side was marked with his participation. He had a chance to ask questions which were answered. There has been no interval change. Plan to continue to the OR for right robotic radical nephrectomy.     Hannah Heck  9/21/2020

## 2020-09-21 NOTE — PROGRESS NOTES
Patient asleep, wakes easily and then falls right back to sleep, VSS, phase I discharge criteria met, will transfer to 4T when room is clean. Wife at the bedside.

## 2020-09-21 NOTE — ANESTHESIA PRE PROCEDURE
Department of Anesthesiology  Preprocedure Note       Name:  Olegario Cowden   Age:  63 y.o.  :  1960                                          MRN:  [id number]         Date:  2020      Surgeon: Clarissa Barrera):  Alfa Kraus MD    Procedure: Procedure(s):  ROBOTIC LAPAROSCOPIC RIGHT NEPHRECTOMY    Medications prior to admission:   Prior to Admission medications    Medication Sig Start Date End Date Taking?  Authorizing Provider   Sildenafil Citrate (VIAGRA PO) Take by mouth   Yes Historical Provider, MD   ferrous sulfate (IRON 325) 325 (65 Fe) MG tablet Take 325 mg by mouth daily (with breakfast)   Yes Historical Provider, MD   metoclopramide (REGLAN) 10 MG tablet Take 10 mg by mouth 3 times daily (before meals)    Historical Provider, MD   ondansetron (ZOFRAN-ODT) 8 MG disintegrating tablet Take 8 mg by mouth every 6 hours as needed for Nausea or Vomiting    Historical Provider, MD   lidocaine viscous-aluminum & magnesium hydroxide-simethicone-diphenhydrAMINE-nystatin Take 5-10 mLs by mouth every 2 hours as needed (as needed for sore throat) 17   Caitlyn Casey MD   prochlorperazine (COMPAZINE) 5 MG tablet Take 1 tablet by mouth every 6 hours as needed for Nausea 17   ELISEO Sauer - CNP   atorvastatin (LIPITOR) 40 MG tablet nightly  17   Historical Provider, MD   Multiple Vitamins-Minerals (CENTRUM SILVER PO) Take by mouth    Historical Provider, MD   Misc Natural Products (PROSTATE SUPPORT PO) Take by mouth Indications: super beta prostate    Historical Provider, MD       Current medications:    Current Facility-Administered Medications   Medication Dose Route Frequency Provider Last Rate Last Dose    lactated ringers infusion   Intravenous Continuous Alfa Kraus MD        lidocaine PF 1 % injection 0.5 mL  0.5 mL Intradermal Once Alfa Kraus MD        ceFAZolin (ANCEF) 2 g in dextrose 5 % 100 mL IVPB  2 g Intravenous On Call to [facility] Eric Garcia MD       [facility] HYDROcodone-acetaminophen (NORCO) 5-325 MG per tablet 1 tablet  1 tablet Oral Once PRN Ivonne Santillan MD        [facility]ron [facility]) injection 4 mg  4 mg Intravenous Once PRN Ivonne Santillan MD        HYDROmorphone (DILAUDID) injection 0.25 mg  0.25 mg Intravenous Q5 Min PRN Ivonne Santillan MD        HYDROmorphone (DILAUDID) injection 0.5 mg  0.5 mg Intravenous Q5 Min PRN Ivonne Santillan MD           Allergies:  No Known Allergies    Problem List:    Patient Active Problem List   Diagnosis Code    Malignant neoplasm of lower third of esophagus (HCC) C15.5    CINV (chemotherapy-induced nausea and vomiting) R11.2, T45.1X5A    Encounter for antineoplastic chemotherapy and immunotherapy Z51.11, Z51.12    Hematemesis K92.0       Past Medical History:        Diagnosis Date    Esophageal cancer ([address]     poss kidney    Hyperlipidemia     Prostate enlargement        Past Surgical History:        Procedure Laterality Date    COLONOSCOPY      ESOPHAGUS SURGERY      for cancer    TUNNELED VENOUS PORT PLACEMENT Right 02/08/2018    inserted in radiology by Dr Catrachita Last as outpt \"power port\"    UPPER GASTROINTESTINAL ENDOSCOPY  2017    UPPER GASTROINTESTINAL ENDOSCOPY  05/17/2017    EUS    UPPER GASTROINTESTINAL ENDOSCOPY  04/06/2018       Social History:    Social History     Tobacco Use    Smoking status: Former Smoker     Packs/day: 0.50     Years: 10.00     Pack years: 5.00     Types: Cigarettes    Smokeless tobacco: Never Used   Substance Use Topics    Alcohol use: Yes     Comment: occasionally                                 Counseling given: Not Answered      Vital Signs (Current):   Vitals:    09/03/20 1309 09/21/20 0946   BP:  136/76   Pulse:  53   Resp:  16   Temp:  96.7 °F (35.9 °C)   TempSrc:  Temporal   SpO2:  100%   Weight: 164 lb (74.4 kg) 158 lb 8 oz (71.9 kg)   Height: 5' 11\" (1.803 m) 5' 11\" (1.803 m)                                              BP Readings from Last 3 Encounters: no history of anesthetic complications:   Airway: Mallampati: II  TM distance: >3 FB   Neck ROM: full  Mouth opening: > = 3 FB Dental:      Comment: Chips bottom teeth  Upper edentulous    Pulmonary: breath sounds clear to auscultation                            ROS comment: Former smoker   Cardiovascular:  Exercise tolerance: good (>4 METS),   (+) hyperlipidemia    (-) CABG/stent, dysrhythmias and  angina      Rhythm: regular  Rate: normal                 ROS comment: Summary   Left ventricular systolic function is normal with ejection fraction   estimated at 60 %. Strain -19.5   No regional wall motion abnormalities are noted. Normal left ventricular wall thickness. Left ventricle size is normal.   Normal left ventricular wall thickness. Mild-to-moderate aortic regurgitation is present. Mild pulmonic regurgitation present. IVC is normal in size (< 2.1 cm) and collapses > 50% with respiration   consistent with normal RA pressure (3 mmHg). Previous echo done 9/2019 - EF 55-60%        Neuro/Psych:      (-) seizures, TIA and CVA           GI/Hepatic/Renal:   (+) GERD (no symptoms today):,           Endo/Other:    (+) malignancy/cancer. Abdominal:           Vascular:                                      Anesthesia Plan      general     ASA 3       Induction: intravenous. MIPS: Postoperative opioids intended, Prophylactic antiemetics administered and Postoperative trial extubation. Anesthetic plan and risks discussed with patient. Use of blood products discussed with patient whom consented to blood products. Plan discussed with CRNA.                   Domingo Fu MD   9/21/2020

## 2020-09-21 NOTE — ANESTHESIA POSTPROCEDURE EVALUATION
Department of Anesthesiology  Postprocedure Note    Patient: Bright Code  MRN: [id number]  YOB: 1960  Date of evaluation: 9/21/2020  Time:  2:40 PM     Procedure Summary     Date:  09/21/20 Room / Location:  [address]    Anesthesia Start:  1110 Anesthesia Stop:  1013    Procedure:  ROBOTIC LAPAROSCOPIC RIGHT NEPHRECTOMY (Right Abdomen) Diagnosis:  (S68.58  NEOPLASM UNCERTAIN BEHAVIOR RIGHT KIDNEY)    Surgeon:  Ceazr Owusu MD Responsible Provider:  Petros Galvan MD    Anesthesia Type:  general ASA Status:  3          Anesthesia Type: general    Amado Phase I: Amado Score: 8    Amado Phase II:      Last vitals: Reviewed and per EMR flowsheets.        Anesthesia Post Evaluation    Patient location during evaluation: PACU  Patient participation: complete - patient participated  Level of consciousness: awake  Airway patency: patent  Nausea & Vomiting: no vomiting  Complications: no  Cardiovascular status: hemodynamically stable  Respiratory status: acceptable  Hydration status: euvolemic

## 2020-09-22 LAB
ANION GAP SERPL CALCULATED.3IONS-SCNC: 9 MMOL/L (ref 3–16)
BUN BLDV-MCNC: 18 MG/DL (ref 7–20)
CALCIUM SERPL-MCNC: 8.8 MG/DL (ref 8.3–10.6)
CHLORIDE BLD-SCNC: 98 MMOL/L (ref 99–110)
CO2: 25 MMOL/L (ref 21–32)
CREAT SERPL-MCNC: 1.8 MG/DL (ref 0.8–1.3)
GFR AFRICAN AMERICAN: 47
GFR NON-AFRICAN AMERICAN: 38
GLUCOSE BLD-MCNC: 138 MG/DL (ref 70–99)
HCT VFR BLD CALC: 42.3 % (ref 40.5–52.5)
HEMOGLOBIN: 14.3 G/DL (ref 13.5–17.5)
MCH RBC QN AUTO: 32.6 PG (ref 26–34)
MCHC RBC AUTO-ENTMCNC: 33.7 G/DL (ref 31–36)
MCV RBC AUTO: 96.7 FL (ref 80–100)
PDW BLD-RTO: 13.7 % (ref 12.4–15.4)
PLATELET # BLD: 163 K/UL (ref 135–450)
PMV BLD AUTO: 7.7 FL (ref 5–10.5)
POTASSIUM SERPL-SCNC: 4 MMOL/L (ref 3.5–5.1)
RBC # BLD: 4.38 M/UL (ref 4.2–5.9)
SODIUM BLD-SCNC: 132 MMOL/L (ref 136–145)
WBC # BLD: 13.4 K/UL (ref 4–11)

## 2020-09-22 PROCEDURE — 6360000002 HC RX W HCPCS: Performed by: UROLOGY

## 2020-09-22 PROCEDURE — 6370000000 HC RX 637 (ALT 250 FOR IP): Performed by: UROLOGY

## 2020-09-22 PROCEDURE — 85027 COMPLETE CBC AUTOMATED: CPT

## 2020-09-22 PROCEDURE — 51798 US URINE CAPACITY MEASURE: CPT

## 2020-09-22 PROCEDURE — 36415 COLL VENOUS BLD VENIPUNCTURE: CPT

## 2020-09-22 PROCEDURE — 1200000000 HC SEMI PRIVATE

## 2020-09-22 PROCEDURE — 2580000003 HC RX 258: Performed by: UROLOGY

## 2020-09-22 PROCEDURE — G0378 HOSPITAL OBSERVATION PER HR: HCPCS

## 2020-09-22 PROCEDURE — 80048 BASIC METABOLIC PNL TOTAL CA: CPT

## 2020-09-22 RX ADMIN — ONDANSETRON 4 MG: 2 INJECTION INTRAMUSCULAR; INTRAVENOUS at 02:01

## 2020-09-22 RX ADMIN — MORPHINE SULFATE 2 MG: 2 INJECTION, SOLUTION INTRAMUSCULAR; INTRAVENOUS at 02:01

## 2020-09-22 RX ADMIN — SODIUM CHLORIDE: 9 INJECTION, SOLUTION INTRAVENOUS at 22:55

## 2020-09-22 RX ADMIN — PROMETHAZINE HYDROCHLORIDE 12.5 MG: 25 TABLET ORAL at 07:22

## 2020-09-22 RX ADMIN — OXYCODONE 10 MG: 5 TABLET ORAL at 11:12

## 2020-09-22 RX ADMIN — ONDANSETRON 4 MG: 2 INJECTION INTRAMUSCULAR; INTRAVENOUS at 11:11

## 2020-09-22 RX ADMIN — ACETAMINOPHEN 650 MG: 325 TABLET ORAL at 13:37

## 2020-09-22 RX ADMIN — STANDARDIZED SENNA CONCENTRATE AND DOCUSATE SODIUM 1 TABLET: 8.6; 5 TABLET ORAL at 19:57

## 2020-09-22 RX ADMIN — ACETAMINOPHEN 650 MG: 325 TABLET ORAL at 05:55

## 2020-09-22 RX ADMIN — ACETAMINOPHEN 650 MG: 325 TABLET ORAL at 17:39

## 2020-09-22 RX ADMIN — CEFAZOLIN SODIUM 2 G: 10 INJECTION, POWDER, FOR SOLUTION INTRAVENOUS at 05:52

## 2020-09-22 RX ADMIN — STANDARDIZED SENNA CONCENTRATE AND DOCUSATE SODIUM 1 TABLET: 8.6; 5 TABLET ORAL at 07:55

## 2020-09-22 RX ADMIN — ENOXAPARIN SODIUM 40 MG: 40 INJECTION SUBCUTANEOUS at 07:55

## 2020-09-22 RX ADMIN — ACETAMINOPHEN 650 MG: 325 TABLET ORAL at 22:55

## 2020-09-22 RX ADMIN — OXYCODONE 5 MG: 5 TABLET ORAL at 19:57

## 2020-09-22 RX ADMIN — ATORVASTATIN CALCIUM 20 MG: 20 TABLET, FILM COATED ORAL at 19:57

## 2020-09-22 ASSESSMENT — PAIN SCALES - GENERAL
PAINLEVEL_OUTOF10: 4
PAINLEVEL_OUTOF10: 3
PAINLEVEL_OUTOF10: 7
PAINLEVEL_OUTOF10: 3
PAINLEVEL_OUTOF10: 0
PAINLEVEL_OUTOF10: 0
PAINLEVEL_OUTOF10: 6
PAINLEVEL_OUTOF10: 2
PAINLEVEL_OUTOF10: 1

## 2020-09-22 ASSESSMENT — PAIN DESCRIPTION - PAIN TYPE
TYPE: SURGICAL PAIN
TYPE: SURGICAL PAIN

## 2020-09-22 ASSESSMENT — PAIN DESCRIPTION - DESCRIPTORS
DESCRIPTORS: SORE
DESCRIPTORS: SORE

## 2020-09-22 ASSESSMENT — PAIN DESCRIPTION - LOCATION
LOCATION: ABDOMEN
LOCATION: ABDOMEN

## 2020-09-22 NOTE — DISCHARGE SUMMARY
these medications    Details   oxyCODONE-acetaminophen (PERCOCET) 5-325 MG per tablet Take 1 tablet by mouth every 6 hours as needed for Pain for up to 5 days. Intended supply: 5 days. Take lowest dose possible to manage pain, Disp-20 tablet,R-0Print      senna-docusate (PERICOLACE) 8.6-50 MG per tablet Take 1 tablet by mouth 2 times daily, Disp-50 tablet,R-0Print         CONTINUE these medications which have NOT CHANGED    Details   omeprazole (PRILOSEC) 20 MG delayed release capsule Take 20 mg by mouth dailyHistorical Med      lidocaine-prilocaine (EMLA) 2.5-2.5 % cream Apply topically as needed for Pain Apply topically as needed., Topical, PRN, Historical Med      trastuzumab (HERCEPTIN) 440 MG chemo injection Infuse 2 mg/kg intravenously once Every three weeksHistorical Med      medical marijuana Take by mouth as needed. Historical Med      sildenafil (VIAGRA) 100 MG tablet Take by mouth every morning Historical Med      ferrous sulfate (IRON 325) 325 (65 Fe) MG tablet Take 325 mg by mouth daily (with breakfast)Historical Med      metoclopramide (REGLAN) 10 MG tablet Take 10 mg by mouth 3 times daily (before meals)Historical Med      ondansetron (ZOFRAN-ODT) 8 MG disintegrating tablet Take 8 mg by mouth every 6 hours as needed for Nausea or VomitingHistorical Med      lidocaine viscous-aluminum & magnesium hydroxide-simethicone-diphenhydrAMINE-nystatin Take 5-10 mLs by mouth every 2 hours as needed (as needed for sore throat), Disp-480 mL, R-54 oz viscous lidocaine, 4 oz maalox, 4 oz benadryl, 4 oz mycostatin suspension, flavor to pt desire. Dispense 16 ounce bottle with 5 refills. Normal      prochlorperazine (COMPAZINE) 5 MG tablet Take 1 tablet by mouth every 6 hours as needed for Nausea, Disp-120 tablet, R-3Normal      atorvastatin (LIPITOR) 20 MG tablet 20 mg nightly Historical Med      Multiple Vitamins-Minerals (CENTRUM SILVER PO) Take by mouthHistorical Med      Misc Natural Products (PROSTATE SUPPORT PO)

## 2020-09-22 NOTE — PROGRESS NOTES
Shift assessment and AM vitals complete, VSS, BP on high side r/t nausea and pain. Will re check. Pt doing well, perez removed this AM pt retaining urine, pt ambulated in hallway. Will re check. Dr. Yared Smith says okay to go if he is able to urinate. Pt still nauseous will let Dr. Yared Smith know. The care plan and education has been reviewed and mutually agreed upon with the patient.

## 2020-09-22 NOTE — PROGRESS NOTES
Shift assessment complete, see flowsheet. Patient in bed, no s/s of distress, respirations even and unlabored, abd soft, lap sites CDI, still reports feeling nauseous, no vomiting, Phenergan administered, ice pack in place to abd, perez in place with clear, yellow urine. The care plan and education has been reviewed and mutually agreed upon with the patient. All needs attended. Fall precautions in place, call light within reach. Will continue to monitor.

## 2020-09-22 NOTE — PROGRESS NOTES
Called Dr. Natalya Arriaza office to update him about nausea and pt not able to keep food down, will wait for call back.

## 2020-09-22 NOTE — PROGRESS NOTES
Patient in bed, reports that nausea is better, not feeling hungry, oxycodone and Tylenol administered for pain, scheduled night meds administered as well, tolerated well. Saltine crackers provided. Will continue to monitor.

## 2020-09-22 NOTE — CARE COORDINATION
Discharge Planning Assessment    RN discharge planner met with patient/ (and family member) to discuss reason for admission, current living situation, and potential needs at the time of discharge    Demographics/Insurance verified Yes [employer]    Current type of dwellin level home with 1 step to enter    Patient from ECF/SW confirmed with: n/a    Living arrangements: with wife    Level of function/Support: independent with ADL    PCP: [facility]    Last Visit to PCP: in past month    DME: none    Active with any community resources/agencies/skilled home care: no    Medication compliance issues:no concerns, uses [facility] issues that could impact healthcare: none reported      Tentative discharge plan: home    Discussed and provided facilities of choice if transition to a skilled nursing facility is required at the time of discharge      Discussed with patient and/or family that on the day of discharge home tentative time of discharge will be between 10 AM and noon.     Transportation at the time of discharge: wife    REGAN MirN, CCM, RN  [facility]  [phone]

## 2020-09-22 NOTE — CARE COORDINATION
Patient discharged 9/22/2020 to home without needs. All discharge needs met per case management.     REGAN BorgesN, CCM, RN  [facility]  [phone]

## 2020-09-22 NOTE — PLAN OF CARE
Problem: MOBILITY  Goal: Early mobilization is achieved  Outcome: Ongoing     Problem: ELIMINATION  Goal: Elimination patterns are normal or improving  Description: Elimination patterns return to pre-surgery normal patterns  Outcome: Ongoing     Problem: Pain:  Goal: Control of acute pain  Description: Control of acute pain  Outcome: Ongoing

## 2020-09-23 ENCOUNTER — APPOINTMENT (OUTPATIENT)
Dept: CT IMAGING | Age: 60
DRG: 657 | End: 2020-09-23
Attending: UROLOGY
Payer: COMMERCIAL

## 2020-09-23 ENCOUNTER — APPOINTMENT (OUTPATIENT)
Dept: GENERAL RADIOLOGY | Age: 60
DRG: 657 | End: 2020-09-23
Attending: UROLOGY
Payer: COMMERCIAL

## 2020-09-23 PROBLEM — N18.9 ACUTE KIDNEY INJURY SUPERIMPOSED ON CKD (HCC): Status: ACTIVE | Noted: 2020-09-23

## 2020-09-23 PROBLEM — E87.1 HYPONATREMIA: Status: ACTIVE | Noted: 2020-09-23

## 2020-09-23 PROBLEM — I10 HTN (HYPERTENSION): Status: ACTIVE | Noted: 2020-09-23

## 2020-09-23 PROBLEM — N18.30 CKD (CHRONIC KIDNEY DISEASE) STAGE 3, GFR 30-59 ML/MIN (HCC): Status: ACTIVE | Noted: 2020-09-23

## 2020-09-23 PROBLEM — N17.9 ACUTE KIDNEY INJURY SUPERIMPOSED ON CKD (HCC): Status: ACTIVE | Noted: 2020-09-23

## 2020-09-23 PROCEDURE — 71046 X-RAY EXAM CHEST 2 VIEWS: CPT

## 2020-09-23 PROCEDURE — 2580000003 HC RX 258: Performed by: UROLOGY

## 2020-09-23 PROCEDURE — 71250 CT THORAX DX C-: CPT

## 2020-09-23 PROCEDURE — 94760 N-INVAS EAR/PLS OXIMETRY 1: CPT

## 2020-09-23 PROCEDURE — 74176 CT ABD & PELVIS W/O CONTRAST: CPT

## 2020-09-23 PROCEDURE — 6360000002 HC RX W HCPCS: Performed by: UROLOGY

## 2020-09-23 PROCEDURE — 99254 IP/OBS CNSLTJ NEW/EST MOD 60: CPT | Performed by: SURGERY

## 2020-09-23 PROCEDURE — 74150 CT ABDOMEN W/O CONTRAST: CPT

## 2020-09-23 PROCEDURE — 2580000003 HC RX 258: Performed by: INTERNAL MEDICINE

## 2020-09-23 PROCEDURE — 6360000002 HC RX W HCPCS: Performed by: INTERNAL MEDICINE

## 2020-09-23 PROCEDURE — 1200000000 HC SEMI PRIVATE

## 2020-09-23 PROCEDURE — 74018 RADEX ABDOMEN 1 VIEW: CPT

## 2020-09-23 PROCEDURE — 6370000000 HC RX 637 (ALT 250 FOR IP): Performed by: UROLOGY

## 2020-09-23 RX ORDER — LIDOCAINE AND PRILOCAINE 25; 25 MG/G; MG/G
CREAM TOPICAL PRN
COMMUNITY
End: 2021-06-07

## 2020-09-23 RX ORDER — PANTOPRAZOLE SODIUM 40 MG/1
40 TABLET, DELAYED RELEASE ORAL
Status: DISCONTINUED | OUTPATIENT
Start: 2020-09-23 | End: 2020-09-26 | Stop reason: HOSPADM

## 2020-09-23 RX ORDER — HYDRALAZINE HYDROCHLORIDE 20 MG/ML
10 INJECTION INTRAMUSCULAR; INTRAVENOUS EVERY 4 HOURS PRN
Status: DISCONTINUED | OUTPATIENT
Start: 2020-09-23 | End: 2020-09-23

## 2020-09-23 RX ORDER — HYDRALAZINE HYDROCHLORIDE 20 MG/ML
10 INJECTION INTRAMUSCULAR; INTRAVENOUS
Status: DISCONTINUED | OUTPATIENT
Start: 2020-09-23 | End: 2020-09-26 | Stop reason: HOSPADM

## 2020-09-23 RX ORDER — OMEPRAZOLE 20 MG/1
20 CAPSULE, DELAYED RELEASE ORAL DAILY
COMMUNITY

## 2020-09-23 RX ORDER — HYDRALAZINE HYDROCHLORIDE 20 MG/ML
10 INJECTION INTRAMUSCULAR; INTRAVENOUS
Status: DISCONTINUED | OUTPATIENT
Start: 2020-09-23 | End: 2020-09-23

## 2020-09-23 RX ORDER — ACETAMINOPHEN 325 MG/1
650 TABLET ORAL EVERY 4 HOURS PRN
Status: DISCONTINUED | OUTPATIENT
Start: 2020-09-23 | End: 2020-09-26 | Stop reason: HOSPADM

## 2020-09-23 RX ORDER — PROMETHAZINE HYDROCHLORIDE 25 MG/ML
12.5 INJECTION, SOLUTION INTRAMUSCULAR; INTRAVENOUS EVERY 4 HOURS PRN
Status: DISCONTINUED | OUTPATIENT
Start: 2020-09-23 | End: 2020-09-26 | Stop reason: HOSPADM

## 2020-09-23 RX ADMIN — PANTOPRAZOLE SODIUM 40 MG: 40 TABLET, DELAYED RELEASE ORAL at 00:28

## 2020-09-23 RX ADMIN — Medication 10 ML: at 10:45

## 2020-09-23 RX ADMIN — ACETAMINOPHEN 650 MG: 325 TABLET ORAL at 17:22

## 2020-09-23 RX ADMIN — HYDRALAZINE HYDROCHLORIDE 10 MG: 20 INJECTION INTRAMUSCULAR; INTRAVENOUS at 10:40

## 2020-09-23 RX ADMIN — ONDANSETRON 4 MG: 2 INJECTION INTRAMUSCULAR; INTRAVENOUS at 13:49

## 2020-09-23 RX ADMIN — ENOXAPARIN SODIUM 40 MG: 40 INJECTION SUBCUTANEOUS at 08:25

## 2020-09-23 RX ADMIN — SODIUM CHLORIDE: 9 INJECTION, SOLUTION INTRAVENOUS at 17:47

## 2020-09-23 RX ADMIN — ACETAMINOPHEN 650 MG: 325 TABLET ORAL at 10:57

## 2020-09-23 RX ADMIN — OXYCODONE 5 MG: 5 TABLET ORAL at 08:25

## 2020-09-23 RX ADMIN — PANTOPRAZOLE SODIUM 40 MG: 40 TABLET, DELAYED RELEASE ORAL at 08:25

## 2020-09-23 RX ADMIN — ONDANSETRON 4 MG: 2 INJECTION INTRAMUSCULAR; INTRAVENOUS at 00:28

## 2020-09-23 RX ADMIN — OXYCODONE 5 MG: 5 TABLET ORAL at 04:15

## 2020-09-23 RX ADMIN — ACETAMINOPHEN 650 MG: 325 TABLET ORAL at 04:15

## 2020-09-23 RX ADMIN — HYDRALAZINE HYDROCHLORIDE 10 MG: 20 INJECTION INTRAMUSCULAR; INTRAVENOUS at 23:27

## 2020-09-23 RX ADMIN — Medication 10 ML: at 15:12

## 2020-09-23 RX ADMIN — HYDRALAZINE HYDROCHLORIDE 10 MG: 20 INJECTION INTRAMUSCULAR; INTRAVENOUS at 17:46

## 2020-09-23 RX ADMIN — STANDARDIZED SENNA CONCENTRATE AND DOCUSATE SODIUM 1 TABLET: 8.6; 5 TABLET ORAL at 08:25

## 2020-09-23 RX ADMIN — Medication 10 ML: at 08:29

## 2020-09-23 RX ADMIN — HYDRALAZINE HYDROCHLORIDE 10 MG: 20 INJECTION INTRAMUSCULAR; INTRAVENOUS at 15:10

## 2020-09-23 RX ADMIN — MORPHINE SULFATE 2 MG: 2 INJECTION, SOLUTION INTRAMUSCULAR; INTRAVENOUS at 23:08

## 2020-09-23 RX ADMIN — PROMETHAZINE HYDROCHLORIDE 12.5 MG: 25 INJECTION INTRAMUSCULAR; INTRAVENOUS at 08:28

## 2020-09-23 ASSESSMENT — PAIN DESCRIPTION - FREQUENCY
FREQUENCY: INTERMITTENT

## 2020-09-23 ASSESSMENT — PAIN SCALES - GENERAL
PAINLEVEL_OUTOF10: 0
PAINLEVEL_OUTOF10: 4
PAINLEVEL_OUTOF10: 4
PAINLEVEL_OUTOF10: 0
PAINLEVEL_OUTOF10: 5
PAINLEVEL_OUTOF10: 4
PAINLEVEL_OUTOF10: 0
PAINLEVEL_OUTOF10: 4
PAINLEVEL_OUTOF10: 4

## 2020-09-23 ASSESSMENT — PAIN DESCRIPTION - PAIN TYPE
TYPE: SURGICAL PAIN

## 2020-09-23 ASSESSMENT — PAIN DESCRIPTION - ONSET
ONSET: ON-GOING

## 2020-09-23 ASSESSMENT — PAIN DESCRIPTION - DESCRIPTORS
DESCRIPTORS: DISCOMFORT

## 2020-09-23 ASSESSMENT — PAIN DESCRIPTION - LOCATION
LOCATION: ABDOMEN

## 2020-09-23 NOTE — CONSULTS
PRN  promethazine (PHENERGAN) injection 12.5 mg, 12.5 mg, Intravenous, Q4H PRN  atorvastatin (LIPITOR) tablet 20 mg, 20 mg, Oral, Nightly  sodium chloride flush 0.9 % injection 10 mL, 10 mL, Intravenous, 2 times per day  sodium chloride flush 0.9 % injection 10 mL, 10 mL, Intravenous, PRN  promethazine (PHENERGAN) tablet 12.5 mg, 12.5 mg, Oral, Q6H PRN **OR** ondansetron (ZOFRAN) injection 4 mg, 4 mg, Intravenous, Q6H PRN  0.9 % sodium chloride infusion, , Intravenous, Continuous  acetaminophen (TYLENOL) tablet 650 mg, 650 mg, Oral, Q6H  oxyCODONE (ROXICODONE) immediate release tablet 5 mg, 5 mg, Oral, Q4H PRN **OR** oxyCODONE (ROXICODONE) immediate release tablet 10 mg, 10 mg, Oral, Q4H PRN  morphine (PF) injection 2 mg, 2 mg, Intravenous, Q2H PRN **OR** morphine injection 4 mg, 4 mg, Intravenous, Q2H PRN  sennosides-docusate sodium (SENOKOT-S) 8.6-50 MG tablet 1 tablet, 1 tablet, Oral, BID  enoxaparin (LOVENOX) injection 40 mg, 40 mg, Subcutaneous, Daily     SUBJECTIVE:  Reports some nausea, elevated blood pressure    Review of Systems :   General ROS:denies any headache or weakness  Ophthalmic ROS: denies any blurred vision, double vision or vision loss  ENT ROS: denies any epistaxis, nasal discharge  Hematological and Lymphatic ROS:denies any fatigue, night sweats, enlarged lymph nodes or bruising ,   Respiratory ROS: denies any shortness of breath, dyspnea on exertion, wheezing, or cough   Cardiovascular ROS: denies any chest pain, palpitations, shortness of breath, dizziness syncope or swelling in extremities  Gastrointestinal ROS: denies any abdominal pain with exception of surgical pain, acid reflux, change in bowel habits or blood in stool, dark or tarry stools, + for nausea and decreased appetite  Musculoskeletal ROS:denies any back pain or joint pain,  Neurological ROS: denies any mental status changes, seizures, memory loss, speech problems or muscle weakness in extremities   Dermatological ROS: denies any rash or skin lesions     OBJECTIVE:      PHYSICAL:   VITALS:  BP (!) 164/77   Pulse 58   Temp 98.2 °F (36.8 °C) (Oral)   Resp 16   Ht 5' 11\" (1.803 m)   Wt 158 lb 8 oz (71.9 kg)   SpO2 96%   BMI 22.11 kg/m²   PULSE OXIMETRY RANGE: SpO2  Av.8 %  Min: 93 %  Max: 96 %    Intake/Output Summary (Last 24 hours) at 2020 1003  Last data filed at 2020 2553  Gross per 24 hour   Intake 2590 ml   Output 2597 ml   Net -7 ml      CONSTITUTIONAL:  awake, alert, cooperative, no apparent distress, and appears stated age  HEAD:  Normocephalic, atraumatic  HEENT:  ENT exam normal, no neck nodes or sinus tenderness  EYE: perrla  NECK:  Supple, symmetrical, trachea midline, no adenopathy, thyroid symmetric, not enlarged and no tenderness, skin normal  LUNGS:  No increased work of breathing, good air exchange, clear to auscultation bilaterally, no crackles or wheezing  CARDIOVASCULAR:  Normal apical impulse, regular rate and rhythm, normal S1 and S2, no S3 or S4, and no murmur noted  ABDOMEN:  Mild tenderness over surgical site, hypoactive bowel sounds  MUSCULOSKELETAL:  There is no redness, warmth, or swelling of the joints. Full range of motion noted. Motor strength is 5 out of 5 all extremities bilaterally. Tone is normal.  NEUROLOGIC:  Awake, alert, oriented to name, place and time. Cranial nerves II-XII are grossly intact. Motor is 5 out of 5 bilaterally. Cerebellar finger to nose, heel to shin intact. Sensory is intact.   Babinski down going, Romberg negative, and gait is normal.  SKIN:  normal skin color, texture, turgor  EDEMA: Normal    Data      CBC with Differential:    Lab Results   Component Value Date    WBC 13.4 2020    RBC 4.38 2020    RBC 4.56 2017    HGB 14.3 2020    HCT 42.3 2020     2020    MCV 96.7 2020    MCH 32.6 2020    MCHC 33.7 2020    RDW 13.7 2020    BANDSPCT 14 2018    LYMPHOPCT 20.4 2020 LYMPHOPCT 14.2 07/20/2017    MONOPCT 13.3 04/07/2020    BASOPCT 0.8 04/07/2020    MONOSABS 0.8 04/07/2020    LYMPHSABS 1.2 04/07/2020    EOSABS 0.1 04/07/2020    BASOSABS 0.0 04/07/2020     CMP:    Lab Results   Component Value Date     09/22/2020    K 4.0 09/22/2020    K 3.7 04/07/2018    CL 98 09/22/2020    CO2 25 09/22/2020    BUN 18 09/22/2020    CREATININE 1.8 09/22/2020    GFRAA 47 09/22/2020    GFRAA >60 01/22/2013    AGRATIO 1.2 06/11/2020    LABGLOM 38 09/22/2020    GLUCOSE 138 09/22/2020    GLUCOSE 135 07/20/2017    PROT 5.5 06/11/2020    PROT 7.4 07/20/2017    LABALBU 3.0 06/11/2020    CALCIUM 8.8 09/22/2020    BILITOT <0.2 06/11/2020    ALKPHOS 72 06/11/2020    AST 29 06/11/2020    ALT 15 06/11/2020       ASSESSMENT      Patient Active Problem List   Diagnosis    Malignant neoplasm of lower third of esophagus (HCC)    CINV (chemotherapy-induced nausea and vomiting)    Encounter for antineoplastic chemotherapy and immunotherapy    Hematemesis    Renal mass    CKD (chronic kidney disease) stage 3, GFR 30-59 ml/min (HCC)    HTN (hypertension)    Hyponatremia    Acute kidney injury superimposed on CKD ([facility] [address]         PLAN    1.) Pt is pod #2 s/p right robotic radical nephrectomy for renal cell carcinoma. 2.) obtain abdominal xray to rule out ileus- right sided stool burden/?pneumo- ordered cxr pa and lateral to r/o pneumo  3.) Hydralazine prn htn.  4.) Encouraged oob/is. 5.) Continue with colace/encouraged po intake to facilitate bm/passing gas  6.) Plan of care discussed with pt and his nurse. 7.) Case discussed with Dr. Pillo Peter who is in agreement with the plan of care.

## 2020-09-23 NOTE — PROGRESS NOTES
Temp 99.2 orally. Jannie Gannon RN notified. Tylenol given per order. Patients nausea has improved after phenergan per order.    Electronically signed by Cindy Miranda on 9/23/2020 at 11:01 AM

## 2020-09-23 NOTE — PROGRESS NOTES
General surgery consulted, Dr. Tino Hall says Thoracic surgeon should be consulted and GI for pt r/t to what is going on. Passed down to PM nurse about situation.

## 2020-09-23 NOTE — PLAN OF CARE
Problem: Pain:  Goal: Pain level will decrease  Description: Pain level will decrease  9/23/2020 0004 by Carolyn Clement RN  Outcome: Ongoing  9/22/2020 1836 by Lola Cobos RN  Outcome: Ongoing  Goal: Control of acute pain  Description: Control of acute pain  9/23/2020 0004 by Carolyn Clement RN  Outcome: Ongoing  9/22/2020 1836 by Lola Cobos RN  Outcome: Ongoing  Goal: Control of chronic pain  Description: Control of chronic pain  9/23/2020 0004 by Carolyn Clement RN  Outcome: Ongoing  9/22/2020 1836 by Lola Cobos RN  Outcome: Ongoing     Problem: HEMODYNAMIC STATUS  Goal: Patient has stable vital signs and fluid balance  9/23/2020 0004 by Carolyn Clement RN  Outcome: Ongoing  9/22/2020 1836 by Lola Cobos RN  Outcome: Ongoing     Problem: OXYGENATION/RESPIRATORY FUNCTION  Goal: Patient will achieve/maintain normal respiratory rate/effort  9/23/2020 0004 by Carolyn Clement RN  Outcome: Ongoing  9/22/2020 1836 by Lola Cobos RN  Outcome: Ongoing     Problem: MOBILITY  Goal: Early mobilization is achieved  9/23/2020 0004 by Carolyn Clement RN  Outcome: Ongoing  9/22/2020 1836 by Lola Cobos RN  Outcome: Ongoing     Problem: ELIMINATION  Goal: Elimination patterns are normal or improving  Description: Elimination patterns return to pre-surgery normal patterns  9/23/2020 0004 by Carolyn Clement RN  Outcome: Ongoing  9/22/2020 1836 by Lola Cobos RN  Outcome: Ongoing

## 2020-09-23 NOTE — PROGRESS NOTES
/95 HR 65 hydralazine 10 mg IVP given per order.  Electronically signed by Tiago Laird on 9/23/2020 at 10:47 AM

## 2020-09-23 NOTE — PROGRESS NOTES
Spoke with NP about pt results. She wants pt out of bed 3 times daily with meals and to get an xray of the chest. Pt needs to ambulate more and use IS more. NP suggest to pre-medicate pt with Zofran before meals if needed.

## 2020-09-23 NOTE — PROGRESS NOTES
/87 HR 92 hydralazine 10 mg IVP given per order.  Electronically signed by Kurt Freeman on 9/23/2020 at 5:58 PM

## 2020-09-23 NOTE — CONSULTS
Received request for consult by floor for HTN. I saw patient at bedside, reviewed chart about current course and asked him about his outpatient docs (He said Dr Amirah Monahan, Dr Allyson Romero). Upon formally writing my consult, I recognized that Dr Fide Almanzar is PCP. Patient verified that with Roel Santos RN. I have removed Renal consult for ROCAEL on CKD to Dr Naty Gómez and GI consult for persistent nausea. I have deleted my requests for labs. I have ordered Hydralazine IV PRN HTN, further management per Dr Fide Almanzar. Labs and medical management will be per Dr Fide Almanzar.     Arvie Apgar, MD

## 2020-09-23 NOTE — PROGRESS NOTES
I have reviewed the CXR and notes in [facility]. Patient is noted to have small right loculated pneumothorax at right lung base. There is also air under the diaphragm. He is not complaining of any SOB per nurse. He is also not hypoxic. No need to chest tube placement at this time. I would obtain another CXR in 4 hours and then another tomorrow morning. Keep on O2 to keep sats close to 99% to help resolve pleural air. Patient also need evaluation by surgery to make sure hat there is no acute intraabdominal process as he has air under the right hemidiaphragm. Another thought process would be that he has residual air from recent right nephrectomy. Plan was relayed to the nurse on the phone.      Electronically signed by Walter Martin MD on 9/23/2020 at 5:06 PM

## 2020-09-23 NOTE — PROGRESS NOTES
Urology Progress Note  [facility]     Patient: Carrie Bird MRN: [id number]  Room/Bed: [facility]9358/9521-67   YOB: 1960  Age/Sex: 63 y.o.male  Admission Date: 9/21/2020     Date of Service:  9/23/2020    ASSESSMENT/PLAN     Right renal mass s/p R kulwinder nephrectomy 9/21  -Ileus still without bowel movement or flatus, dry heaves this morning  -Abdominal exam soft, appropriately tender to palpation, nondistended, incisions are all clean dry and intact  Hypertension  ROCAEL on CKD  -Dr. Janet Gabriel PCP following, appreciate medicine assistance, hydralazine  Catheter out and voiding    Addendum: KUB with ileus ? PTX fu CXR apprec med assistance    Recommendations:  Continue bowel regimen, follow-up resolution of ileus then discharged home  Out of bed, incentive spirometer, standard postoperative pathway recovery    All patient questions were answered. He understands the plan as listed above. SUBJECTIVE     Chief Complaint: Follow-up right nephrectomy    24 Hour Events: Today patient reports no bowel movement or flatus with some dry heaves this morning. Otherwise symptoms are overall improved and pain is adequately controlled. Denies nausea/vomiting. No other genitourinary symptoms. OBJECTIVE     Hospital Problem List:  Principal Problem:    Renal mass  Resolved Problems:    * No resolved hospital problems. *      Physical Exam:  Vitals:    09/23/20 0645   BP: (!) 185/87   Pulse: 62   Resp:    Temp:    SpO2:      CONSTITUTIONAL: The patient is well nourished/developed, with no distress noted. NEUROLOGICAL/PSYCHIATRIC: Oriented to place and time, normal affected noted. CARDIOVASCULAR: Regular rate and rhythm, no evidence of swelling noted. RESPIRATORY: Normal respiratory effort with no wheezing noted. ABDOMEN: Abdomen soft, non-tender, non-distended. No enlarged liver or spleen. No hernias noted. Incisions clean dry intact  GENITOURINARY: No CVA tenderness bilaterally.

## 2020-09-23 NOTE — PROGRESS NOTES
Pt back from Xray. Sitting up in chair eating. /94 HR 99. Instructed to use the incentive spirometer. Call bell within reach. SP hydralazine 10mg ivp. Costa Jacque RN aware.   Electronically signed by Cruzito Ruiz on 9/23/2020 at 4:10 PM

## 2020-09-23 NOTE — PROGRESS NOTES
Shift assessment and Am vitals complete. AM meds given. Pt BP and temp has increased  WBC count up, hospitalist on board and nephrology to see pt. Hydralazine ordered for increase BP and abd xray ordered to check for any obstruction. Pt abd site look good, pt just has some nausea this AM, no other symptoms. Will continue to monitor. The care plan and education has been reviewed and mutually agreed upon with the patient.

## 2020-09-23 NOTE — PROGRESS NOTES
Initial BP was 203/93 and 219/92. Retook BP after pain and nausea medicine and /77. Hydralazine held, parameters not met. Will proceed to monitor BP.   Electronically signed by Leo Yates on 9/23/2020 at 9:11 AM

## 2020-09-23 NOTE — PROGRESS NOTES
Attempted to try to eat dinner, was able to take a few bites and became nauseous. Zofran administered. Will continue to monitor.

## 2020-09-23 NOTE — PROGRESS NOTES
Talked to pulmonologist and she will look at pt's chest xray. Pt is not having any symptoms with trouble breathing, O2 is 95-75 Room air. Pt is weak when getting up, will continue to monitor.

## 2020-09-23 NOTE — PROGRESS NOTES
MD Benson Arcos MD Epimenio Blowers, MD                Office: [phone]                      Fax: [phone]               [email or web address]                   Nephrology consult received from Dr Joslyn Marin for ROCAEL. My full consult report will follow. Thank you for allowing us to participate in this patient's care. Please do not hesitate to contact me for any questions/concerns. Umair Richard MD  [facility] [address] [place] [address]   [phone] or [address] [employer]      ======    Addendum:   9:18 AM  Consult has been canceled by Dr Joslyn Marin as transitioning care back to Dr Tarik Trujillo. I will be available anytime, if needed for his renal failure.      Umair Richard MD

## 2020-09-24 ENCOUNTER — APPOINTMENT (OUTPATIENT)
Dept: GENERAL RADIOLOGY | Age: 60
DRG: 657 | End: 2020-09-24
Attending: UROLOGY
Payer: COMMERCIAL

## 2020-09-24 PROBLEM — J94.8 HYDROPNEUMOTHORAX: Status: ACTIVE | Noted: 2020-09-24

## 2020-09-24 PROBLEM — Z01.818 PREOP TESTING: Status: ACTIVE | Noted: 2017-06-22

## 2020-09-24 LAB
A/G RATIO: 1.1 (ref 1.1–2.2)
ALBUMIN SERPL-MCNC: 3.9 G/DL (ref 3.4–5)
ALP BLD-CCNC: 99 U/L (ref 40–129)
ALT SERPL-CCNC: 23 U/L (ref 10–40)
ANION GAP SERPL CALCULATED.3IONS-SCNC: 13 MMOL/L (ref 3–16)
APTT: 27 SEC (ref 24.2–36.2)
AST SERPL-CCNC: 57 U/L (ref 15–37)
BASOPHILS ABSOLUTE: 0 K/UL (ref 0–0.2)
BASOPHILS RELATIVE PERCENT: 0.3 %
BILIRUB SERPL-MCNC: 0.5 MG/DL (ref 0–1)
BUN BLDV-MCNC: 15 MG/DL (ref 7–20)
CALCIUM SERPL-MCNC: 9.2 MG/DL (ref 8.3–10.6)
CHLORIDE BLD-SCNC: 100 MMOL/L (ref 99–110)
CO2: 22 MMOL/L (ref 21–32)
CREAT SERPL-MCNC: 1.7 MG/DL (ref 0.8–1.3)
EOSINOPHILS ABSOLUTE: 0 K/UL (ref 0–0.6)
EOSINOPHILS RELATIVE PERCENT: 0 %
GFR AFRICAN AMERICAN: 50
GFR NON-AFRICAN AMERICAN: 41
GLOBULIN: 3.7 G/DL
GLUCOSE BLD-MCNC: 122 MG/DL (ref 70–99)
HCT VFR BLD CALC: 44.1 % (ref 40.5–52.5)
HEMOGLOBIN: 14.9 G/DL (ref 13.5–17.5)
INR BLD: 1.01 (ref 0.86–1.14)
LACTIC ACID: 1.1 MMOL/L (ref 0.4–2)
LYMPHOCYTES ABSOLUTE: 0.6 K/UL (ref 1–5.1)
LYMPHOCYTES RELATIVE PERCENT: 4.5 %
MAGNESIUM: 1.9 MG/DL (ref 1.8–2.4)
MCH RBC QN AUTO: 32.2 PG (ref 26–34)
MCHC RBC AUTO-ENTMCNC: 33.8 G/DL (ref 31–36)
MCV RBC AUTO: 95.5 FL (ref 80–100)
MONOCYTES ABSOLUTE: 1.3 K/UL (ref 0–1.3)
MONOCYTES RELATIVE PERCENT: 10.1 %
NEUTROPHILS ABSOLUTE: 10.9 K/UL (ref 1.7–7.7)
NEUTROPHILS RELATIVE PERCENT: 85.1 %
PDW BLD-RTO: 13.9 % (ref 12.4–15.4)
PHOSPHORUS: 2.8 MG/DL (ref 2.5–4.9)
PLATELET # BLD: 165 K/UL (ref 135–450)
PMV BLD AUTO: 7.9 FL (ref 5–10.5)
POTASSIUM SERPL-SCNC: 3.6 MMOL/L (ref 3.5–5.1)
PREALBUMIN: 16 MG/DL (ref 20–40)
PROTHROMBIN TIME: 11.7 SEC (ref 10–13.2)
RBC # BLD: 4.62 M/UL (ref 4.2–5.9)
SODIUM BLD-SCNC: 135 MMOL/L (ref 136–145)
TOTAL PROTEIN: 7.6 G/DL (ref 6.4–8.2)
TRANSFERRIN: 204 MG/DL (ref 200–360)
WBC # BLD: 12.8 K/UL (ref 4–11)

## 2020-09-24 PROCEDURE — 99223 1ST HOSP IP/OBS HIGH 75: CPT | Performed by: INTERNAL MEDICINE

## 2020-09-24 PROCEDURE — 6370000000 HC RX 637 (ALT 250 FOR IP): Performed by: UROLOGY

## 2020-09-24 PROCEDURE — 6360000002 HC RX W HCPCS: Performed by: UROLOGY

## 2020-09-24 PROCEDURE — 97165 OT EVAL LOW COMPLEX 30 MIN: CPT

## 2020-09-24 PROCEDURE — 84100 ASSAY OF PHOSPHORUS: CPT

## 2020-09-24 PROCEDURE — 84134 ASSAY OF PREALBUMIN: CPT

## 2020-09-24 PROCEDURE — 2580000003 HC RX 258: Performed by: UROLOGY

## 2020-09-24 PROCEDURE — 85025 COMPLETE CBC W/AUTO DIFF WBC: CPT

## 2020-09-24 PROCEDURE — 80053 COMPREHEN METABOLIC PANEL: CPT

## 2020-09-24 PROCEDURE — 83735 ASSAY OF MAGNESIUM: CPT

## 2020-09-24 PROCEDURE — 6370000000 HC RX 637 (ALT 250 FOR IP): Performed by: INTERNAL MEDICINE

## 2020-09-24 PROCEDURE — 2580000003 HC RX 258: Performed by: INTERNAL MEDICINE

## 2020-09-24 PROCEDURE — 1200000000 HC SEMI PRIVATE

## 2020-09-24 PROCEDURE — 84466 ASSAY OF TRANSFERRIN: CPT

## 2020-09-24 PROCEDURE — 71045 X-RAY EXAM CHEST 1 VIEW: CPT

## 2020-09-24 PROCEDURE — APPSS15 APP SPLIT SHARED TIME 0-15 MINUTES: Performed by: NURSE PRACTITIONER

## 2020-09-24 PROCEDURE — 6360000002 HC RX W HCPCS: Performed by: INTERNAL MEDICINE

## 2020-09-24 PROCEDURE — 83605 ASSAY OF LACTIC ACID: CPT

## 2020-09-24 PROCEDURE — APPNB30 APP NON BILLABLE TIME 0-30 MINS: Performed by: NURSE PRACTITIONER

## 2020-09-24 PROCEDURE — 85730 THROMBOPLASTIN TIME PARTIAL: CPT

## 2020-09-24 PROCEDURE — 97161 PT EVAL LOW COMPLEX 20 MIN: CPT

## 2020-09-24 PROCEDURE — 85610 PROTHROMBIN TIME: CPT

## 2020-09-24 PROCEDURE — 99232 SBSQ HOSP IP/OBS MODERATE 35: CPT | Performed by: SURGERY

## 2020-09-24 RX ORDER — AMLODIPINE BESYLATE 5 MG/1
5 TABLET ORAL DAILY
Status: DISCONTINUED | OUTPATIENT
Start: 2020-09-24 | End: 2020-09-25

## 2020-09-24 RX ORDER — CLONIDINE 0.2 MG/24H
1 PATCH, EXTENDED RELEASE TRANSDERMAL WEEKLY
Status: DISCONTINUED | OUTPATIENT
Start: 2020-09-24 | End: 2020-09-26 | Stop reason: HOSPADM

## 2020-09-24 RX ADMIN — ACETAMINOPHEN 650 MG: 325 TABLET ORAL at 16:47

## 2020-09-24 RX ADMIN — ACETAMINOPHEN 650 MG: 325 TABLET ORAL at 23:18

## 2020-09-24 RX ADMIN — ENOXAPARIN SODIUM 40 MG: 40 INJECTION SUBCUTANEOUS at 07:57

## 2020-09-24 RX ADMIN — HYDRALAZINE HYDROCHLORIDE 10 MG: 20 INJECTION INTRAMUSCULAR; INTRAVENOUS at 15:33

## 2020-09-24 RX ADMIN — ONDANSETRON 4 MG: 2 INJECTION INTRAMUSCULAR; INTRAVENOUS at 16:48

## 2020-09-24 RX ADMIN — HYDRALAZINE HYDROCHLORIDE 10 MG: 20 INJECTION INTRAMUSCULAR; INTRAVENOUS at 05:26

## 2020-09-24 RX ADMIN — MORPHINE SULFATE 2 MG: 2 INJECTION, SOLUTION INTRAMUSCULAR; INTRAVENOUS at 05:13

## 2020-09-24 RX ADMIN — Medication 10 ML: at 22:51

## 2020-09-24 RX ADMIN — PANTOPRAZOLE SODIUM 40 MG: 40 TABLET, DELAYED RELEASE ORAL at 07:57

## 2020-09-24 RX ADMIN — HYDRALAZINE HYDROCHLORIDE 10 MG: 20 INJECTION INTRAMUSCULAR; INTRAVENOUS at 02:58

## 2020-09-24 RX ADMIN — ONDANSETRON 4 MG: 2 INJECTION INTRAMUSCULAR; INTRAVENOUS at 05:13

## 2020-09-24 RX ADMIN — STANDARDIZED SENNA CONCENTRATE AND DOCUSATE SODIUM 1 TABLET: 8.6; 5 TABLET ORAL at 21:22

## 2020-09-24 RX ADMIN — OXYCODONE 5 MG: 5 TABLET ORAL at 23:21

## 2020-09-24 RX ADMIN — ATORVASTATIN CALCIUM 20 MG: 20 TABLET, FILM COATED ORAL at 21:22

## 2020-09-24 RX ADMIN — ACETAMINOPHEN 650 MG: 325 TABLET ORAL at 12:20

## 2020-09-24 RX ADMIN — AMLODIPINE BESYLATE 5 MG: 5 TABLET ORAL at 21:22

## 2020-09-24 RX ADMIN — STANDARDIZED SENNA CONCENTRATE AND DOCUSATE SODIUM 1 TABLET: 8.6; 5 TABLET ORAL at 07:57

## 2020-09-24 RX ADMIN — HYDRALAZINE HYDROCHLORIDE 10 MG: 20 INJECTION INTRAMUSCULAR; INTRAVENOUS at 23:26

## 2020-09-24 RX ADMIN — SODIUM CHLORIDE: 9 INJECTION, SOLUTION INTRAVENOUS at 11:35

## 2020-09-24 ASSESSMENT — PAIN SCALES - GENERAL
PAINLEVEL_OUTOF10: 0
PAINLEVEL_OUTOF10: 0
PAINLEVEL_OUTOF10: 4
PAINLEVEL_OUTOF10: 0
PAINLEVEL_OUTOF10: 6
PAINLEVEL_OUTOF10: 6
PAINLEVEL_OUTOF10: 0
PAINLEVEL_OUTOF10: 0
PAINLEVEL_OUTOF10: 6

## 2020-09-24 ASSESSMENT — PAIN DESCRIPTION - ONSET: ONSET: ON-GOING

## 2020-09-24 ASSESSMENT — PAIN DESCRIPTION - PAIN TYPE
TYPE: SURGICAL PAIN

## 2020-09-24 ASSESSMENT — PAIN DESCRIPTION - FREQUENCY: FREQUENCY: INTERMITTENT

## 2020-09-24 ASSESSMENT — PAIN DESCRIPTION - LOCATION
LOCATION: ABDOMEN

## 2020-09-24 ASSESSMENT — PAIN DESCRIPTION - DESCRIPTORS
DESCRIPTORS: DISCOMFORT

## 2020-09-24 NOTE — CONSULTS
Department of Cardiovascular & Thoracic Surgery  Consultation          PCP: Drea Serrato MD    Referring Physician: Danny Ibarra MD    DIAGNOSIS:  Right pneumothorax    CHIEF COMPLAINT:  Nausea, dry heaves     History Obtained From:  patient, electronic medical record    HISTORY OF PRESENT ILLNESS:      The patient is a 63 y.o. male with significant past medical history of   renal insufficiency , bph, h/o esophagectomy with gastric pull-through at [facility] for Stage IV esophageal cancer who underwent right robotic radial nephrectomy for renal cell cancer on 9/21/2020. Following the surgery develop issues with nausea, dry heaves, lack of appetite, and elevated blood pressure. Chest xray showed small right loculated pneumothorax. CT abdomen with contrast did not show any evidence of viscus perforation and CT of the chest with oral contraast did not show any oral contrast extravasation in the chest that would be concerning for intrathoracic leak. We have been consulted to evaluate the patient for the right basilar pneuomothorax. Patient is in stable condition. Past Medical History:        Diagnosis Date    Esophageal cancer ([address]     poss kidney    Hyperlipidemia     Prostate enlargement        Past Surgical History:        Procedure Laterality Date    COLONOSCOPY      ESOPHAGUS SURGERY      for cancer    KIDNEY REMOVAL Right 9/21/2020    ROBOTIC LAPAROSCOPIC RIGHT NEPHRECTOMY performed by Pastor Brigitte MD at [address] Right 02/08/2018    inserted in radiology by Dr Tacos Gimenez as outpt \"power port\"    UPPER GASTROINTESTINAL ENDOSCOPY  2017    UPPER GASTROINTESTINAL ENDOSCOPY  05/17/2017    EUS    UPPER GASTROINTESTINAL ENDOSCOPY  04/06/2018     Home medications:  Prior to Admission medications    Medication Sig Start Date End Date Taking?  Authorizing Provider   omeprazole (PRILOSEC) 20 MG delayed release capsule Take 20 mg by mouth daily   Yes Historical Provider, MD lidocaine-prilocaine (EMLA) 2.5-2.5 % cream Apply topically as needed for Pain Apply topically as needed. Yes Historical Provider, MD   trastuzumab (HERCEPTIN) 440 MG chemo injection Infuse 2 mg/kg intravenously once Every three weeks   Yes Historical Provider, MD   medical marijuana Take by mouth as needed. Yes Historical Provider, MD   oxyCODONE-acetaminophen (PERCOCET) 5-325 MG per tablet Take 1 tablet by mouth every 6 hours as needed for Pain for up to 5 days. Intended supply: 5 days.  Take lowest dose possible to manage pain 9/21/20 9/26/20 Yes Adonis Buenrostro MD   senna-docusate (PERICOLACE) 8.6-50 MG per tablet Take 1 tablet by mouth 2 times daily 9/21/20  Yes Adonis Buenrostro MD   sildenafil (VIAGRA) 100 MG tablet Take by mouth every morning    Yes Historical Provider, MD   ferrous sulfate (IRON 325) 325 (65 Fe) MG tablet Take 325 mg by mouth daily (with breakfast)   Yes Historical Provider, MD   atorvastatin (LIPITOR) 20 MG tablet 20 mg nightly  4/13/17  Yes Historical Provider, MD   Multiple Vitamins-Minerals (CENTRUM SILVER PO) Take by mouth   Yes Historical Provider, MD   Misc Natural Products (PROSTATE SUPPORT PO) Take by mouth Indications: super beta prostate   Yes Historical Provider, MD   metoclopramide (REGLAN) 10 MG tablet Take 10 mg by mouth 3 times daily (before meals)    Historical Provider, MD   ondansetron (ZOFRAN-ODT) 8 MG disintegrating tablet Take 8 mg by mouth every 6 hours as needed for Nausea or Vomiting    Historical Provider, MD   lidocaine viscous-aluminum & magnesium hydroxide-simethicone-diphenhydrAMINE-nystatin Take 5-10 mLs by mouth every 2 hours as needed (as needed for sore throat) 6/14/17   Hermelindo Monahan MD   prochlorperazine (COMPAZINE) 5 MG tablet Take 1 tablet by mouth every 6 hours as needed for Nausea 5/26/17   ELISEO Garcia - CNP       Medications:   Current Facility-Administered Medications   Medication Dose Route Frequency Provider Last Rate Last Dose    cloNIDine (CATAPRES) 0.2 MG/24HR 1 patch  1 patch Transdermal Weekly Sarah Moreno MD        pantoprazole (PROTONIX) tablet 40 mg  40 mg Oral QAM AC Ravi Lindsey MD   40 mg at 09/24/20 0757    acetaminophen (TYLENOL) tablet 650 mg  650 mg Oral Q4H PRN Vicky Hall MD        promethazine (PHENERGAN) injection 12.5 mg  12.5 mg Intravenous Q4H PRN Vicky Hall MD   12.5 mg at 09/23/20 0828    hydrALAZINE (APRESOLINE) injection 10 mg  10 mg Intravenous Q2H PRN Sarah Moreno MD   10 mg at 09/24/20 0526    iohexol (OMNIPAQUE 240) injection 50 mL  50 mL Oral ONCE PRN Severo Running, MD        atorvastatin (LIPITOR) tablet 20 mg  20 mg Oral Nightly Ravi Lindsey MD   20 mg at 09/22/20 1957    sodium chloride flush 0.9 % injection 10 mL  10 mL Intravenous 2 times per day Ravi Lindsey MD   10 mL at 09/23/20 0829    sodium chloride flush 0.9 % injection 10 mL  10 mL Intravenous PRN Ravi Lindsey MD   10 mL at 09/23/20 1512    promethazine (PHENERGAN) tablet 12.5 mg  12.5 mg Oral Q6H PRN Ravi Lindsey MD   12.5 mg at 09/22/20 2615    Or    ondansetron (ZOFRAN) injection 4 mg  4 mg Intravenous Q6H PRN Ravi Lindsey MD   4 mg at 09/24/20 0513    0.9 % sodium chloride infusion   Intravenous Continuous Teresa Joya  mL/hr at 09/23/20 1749      acetaminophen (TYLENOL) tablet 650 mg  650 mg Oral Q6H Ravi Lindsey MD   650 mg at 09/23/20 1722    oxyCODONE (ROXICODONE) immediate release tablet 5 mg  5 mg Oral Q4H PRN Ravi Lindsey MD   5 mg at 09/23/20 0825    Or    oxyCODONE (ROXICODONE) immediate release tablet 10 mg  10 mg Oral Q4H PRN Ravi Lindsey MD   10 mg at 09/22/20 1112    morphine (PF) injection 2 mg  2 mg Intravenous Q2H PRN Ravi Lindsey MD   2 mg at 09/24/20 0544    Or    morphine injection 4 mg  4 mg Intravenous Q2H PRN Ravi Lindsey MD   4 mg at 09/21/20 1713    sennosides-docusate sodium (SENOKOT-S) 8.6-50 MG tablet 1 tablet  1 tablet Oral BID Maverick Huff TAVIA Gonsales MD   1 tablet at 09/24/20 0757    enoxaparin (LOVENOX) injection 40 mg  40 mg Subcutaneous Daily Saul Chadwick MD   40 mg at 09/24/20 0757       Allergies:  Patient has no known allergies. Social History:    TOBACCO: former smoker  ETOH:  Reports no history of alcohol abuse   DRUGS: reports no history of drug abuse   LIFESTYLE:active       Family History:    History reviewed. No pertinent family history. REVIEW OF SYSTEMS:    CONSTITUTIONAL:  Alert and oriented 63years old male   DERMATOLOGICAL: negative  NEUROLOGICAL:  negative  EYES:  negative  HEENT:  negative  RESPIRATORY:  negative  CARDIOVASCULAR:  negative  GASTROINTESTINAL:  negative  GENITO-URINARY: no dysuria, trouble voiding, or hematuria  ENDOCRINE: negative  MUSCULOSKELETAL:  negative  HEMATOLOGICAL AND LYMPHATIC: negative  IMMUNOLOGICAL: negative  PSYCHOLOGICAL: negative    PHYSICAL EXAM:    VITALS:  BP (!) 178/91   Pulse 103   Temp 98.8 °F (37.1 °C) (Oral)   Resp 18   Ht 5' 11\" (1.803 m)   Wt 158 lb 8 oz (71.9 kg)   SpO2 99%   BMI 22.11 kg/m²     Eyes:  lids and lashes normal, pupils equal and round, extra ocular muscles intact, sclera clear, conjunctiva normal    Head/ENT:  Normocephalic, atraumatic, no residual dentition, normal gums, & palate, oropharynx without erythema or exudates    Neck:  supple, symmetrical, trachea midline, no lymphadenopathy, no jugular venous distension, no carotid bruits and MASSES:  no masses    Lungs:  no increased work of breathing, good air exchange, no retractions and clear to auscultation, no palpable / percussible abnormalities    Cardiovascular:  regular rate and rhythm, S1, S2 normal, no murmur, click, rub or gallop      Abdomen:  Soft, normal bowel sounds, non-tender, no hepatosplenomegaly, aorta likely normal and bruits absent.   Abdominal incisions from recetn robotic assisted nephrectomy are C/D/I    Musculoskeletal:  Back is straight and non-tender,  No CVAT, full ROM of upper and lower extremities. Extremities:   No clubbing, or cyanosis, or edema     Skin: warm and normal turgor, no ulcers, infections, or rashes, no rashes, no ecchymoses, no petechiae, no nodules, no jaundice, no purpura, no wounds, no acanthosis nigricans, no striae    Neurological: awake, alert and oriented x 3, motor 5/5 bilateral upper and lower extremities, sensation grossly intact    Psychiatric: Mood and affect appear appropriate    DATA:      CBC:   Lab Results   Component Value Date    WBC 12.8 09/24/2020    RBC 4.62 09/24/2020    RBC 4.56 07/20/2017    HGB 14.9 09/24/2020    HCT 44.1 09/24/2020    MCV 95.5 09/24/2020    MCH 32.2 09/24/2020    MCHC 33.8 09/24/2020    RDW 13.9 09/24/2020     09/24/2020    MPV 7.9 09/24/2020     BMP:    Lab Results   Component Value Date     09/24/2020    K 3.6 09/24/2020    K 3.7 04/07/2018     09/24/2020    CO2 22 09/24/2020    BUN 15 09/24/2020    LABALBU 3.9 09/24/2020    CREATININE 1.7 09/24/2020    CALCIUM 9.2 09/24/2020    GFRAA 50 09/24/2020    GFRAA >60 01/22/2013    LABGLOM 41 09/24/2020    GLUCOSE 122 09/24/2020    GLUCOSE 135 07/20/2017     CXRAY:  9/24/2020  Relatively stable appearance of right pneumothorax.  No significant fluid   component on current plain film. ECHO/RASHAAD: 6/11/2020  Left ventricular systolic function is normal with ejection fraction   estimated at 60 %. Strain -19.5   No regional wall motion abnormalities are noted. Normal left ventricular wall thickness. Left ventricle size is normal.   Normal left ventricular wall thickness. Mild-to-moderate aortic regurgitation is present. Mild pulmonic regurgitation present. IVC is normal in size (< 2.1 cm) and collapses > 50% with respiration   consistent with normal RA pressure (3 mmHg).    Previous echo done 9/2019 - EF 55-60%    CT of abdomen: 9/23/2020  Postsurgical changes from recent right nephrectomy.       There is a small to moderate size right sided hydropneumothorax similar given   differences in the technique to the comparison.  Recommend continued   follow-up.       Postsurgical changes from gastric pull-through are noted.  Oral contrast   within the esophagus gastric pull-through and proximal small bowel   demonstrates no obvious evidence of extravasation to suggest perforation.       Air surrounding the stomach and gastric pull-through favored to represent   extension of air from recent nephrectomy.       Additional follow-up can be obtained if clinically indicated. CT of chest : 9/23/2020  Postsurgical changes from recent right nephrectomy.       There is a small to moderate size right sided hydropneumothorax similar given   differences in the technique to the comparison.  Recommend continued   follow-up.       Postsurgical changes from gastric pull-through are noted.  Oral contrast   within the esophagus gastric pull-through and proximal small bowel   demonstrates no obvious evidence of extravasation to suggest perforation.       Air surrounding the stomach and gastric pull-through favored to represent   extension of air from recent nephrectomy.       Additional follow-up can be obtained if clinically indicated. ASSESSMENT AND PLAN:    64 y/o male with intra-abdominal and intra-thoracic pneumatosis 3 days after robotic right radical nephrectomy in afebrile patient with WBC 12.8 and h/o esophagectomy with gastric pull-through at [facility] for Stage IV esophageal cancer. CT chest with oral contrast shows no extravasation. Be nigh abdominal exam per general surgery , no need for thoracic surgical  Intervention in regards with the patients prior esophagectomy  In regards with the right basilar PTX, this is almost certain  iatrogenic due to abdominal insufflation, its mild to moderate in size.   Patient is asymptomatic on RA  Agree with pulmonary for observation for today and repeat CXR in am  If PTX stable or larger in size ,would consider image guided

## 2020-09-24 NOTE — PROGRESS NOTES
Physical Therapy    Facility/Department: [address] ORTHO/NEURO NURSING  Initial Assessment/Discharge Summary    NAME: Alyssa Wall  : 1960  MRN: [id number]    Date of Service: 2020    Discharge Recommendations:  Alyssa Wall scored a 24/24 on the AM-PAC short mobility form. At this time, no further PT is recommended upon discharge due to pt returning to baseline function. Recommend patient returns to prior setting with prior services. Home independently   PT Equipment Recommendations  Equipment Needed: No    Assessment   Assessment: Pt presents at baseline function, no additional PT indicated  Decision Making: Low Complexity  PT Education: PT Role  Patient Education: Pt educated on DC plan, verbalized understanding  Barriers to Learning: none  No Skilled PT: At baseline function  REQUIRES PT FOLLOW UP: No  Activity Tolerance  Activity Tolerance: Patient Tolerated treatment well  Activity Tolerance: Pt tolerated evaluation well with no reports of nausea. He did spit often into a nausea bad, but it was clear saliva only. Patient Diagnosis(es): The primary encounter diagnosis was Preop testing. A diagnosis of Renal mass was also pertinent to this visit. has a past medical history of Esophageal cancer ([facility] [address] Hyperlipidemia, and Prostate enlargement. has a past surgical history that includes Upper gastrointestinal endoscopy (); Colonoscopy; Upper gastrointestinal endoscopy (2017); Tunneled venous port placement (Right, 2018); Upper gastrointestinal endoscopy (2018); Esophagus surgery; and Kidney removal (Right, 2020). Restrictions  Position Activity Restriction  Other position/activity restrictions: The patient is a 62 yo  [place] male with pmh significant for hyperlipidemia, renal insufficiency,bph, hx of esophageal cancer who underwent right robotic radical nephrectomy for renal cell ca on 20 with Dr. Yared Smith.   The patient did fair following level tile  Device: No Device  Assistance: Independent  Gait Deviations: None  Distance: 300 feet  Stairs/Curb  Stairs?: Yes  Stairs  # Steps : 5  Stairs Height: 4\"  Rails: None  Device: No Device  Assistance: Independent     Balance  Posture: Good  Sitting - Static: Good  Sitting - Dynamic: Good  Standing - Static: Good  Standing - Dynamic: Good      Plan   Plan  Times per week: 0  Safety Devices  Type of devices: Left in chair, Nurse notified    AM-PAC Score  AM-PAC Inpatient Mobility Raw Score : 24 (09/24/20 1110)  AM-PAC Inpatient T-Scale Score : 61.14 (09/24/20 1110)  Mobility Inpatient CMS 0-100% Score: 0 (09/24/20 1110)  Mobility Inpatient CMS G-Code Modifier : [address] (09/24/20 1110)        Goals  Patient Goals   Patient goals : Pt will return home at baseline function     Therapy Time   Individual Concurrent Group Co-treatment   Time In 0948         Time Out 1008         Minutes 20         Timed Code Treatment Minutes: [address] [facility], [facility]  Christ Clement, PT   Christ Clement, PT, DPT, [id number]  PT providing direct supervision during session and assisting in making skilled judgements throughout session.

## 2020-09-24 NOTE — PROGRESS NOTES
Shift assessment complete, see flowsheet. Patient in bed, no s/s of distress, respirations even and unlabored on room air, denies SOB, abd soft, hypoactive bowel sounds, nausea and abd pain controlled at this time, IS use and frequent ambulation encouraged. The care plan and education has been reviewed and mutually agreed upon with the patient. All needs attended. Fall precautions in place, call light within reach. Will continue to monitor.

## 2020-09-24 NOTE — CONSULTS
[facility]     Consult                                                     Patient Name: Estefania Contreras  MRN: [id number]  YOB: 1960  Admission date: 9/21/2020  9:19 AM   Date of evaluation: 9/23/2020  Primary Care Physician: Jojo Cruz MD  Requesting physician: Taylor Dunn MD  Reason for consult: Pneumothorax, pneumoperitoneum  History of Present Illness:    Mr. Neetu Campos is a 63 y.o. male who underwent robot assisted laparoscopic radical right nephrectomy on 9/21/20 for renal mass. Since he's had mild nausea, dry heaves, hiccups and prompted chest and abdominal X-ray that show pneumoperitoneum and right pneumothorax. General surgery is consulted at this point. Otherwise, the patient admits to minimal and appropriate incisional pain, denies chest pain, dyspnea, cough, dysuria, fevers, chills, other complaints. Medical and surgical history significant for esophageal cancer s/p resection.      Past Medical History:        Diagnosis Date    Esophageal cancer ([address]     poss kidney    Hyperlipidemia     Prostate enlargement        Past Surgical History:        Procedure Laterality Date    COLONOSCOPY      ESOPHAGUS SURGERY      for cancer    KIDNEY REMOVAL Right 9/21/2020    ROBOTIC LAPAROSCOPIC RIGHT NEPHRECTOMY performed by Soni Hall MD at [place] [address] Right 02/08/2018    inserted in radiology by Dr Aisha Irwin as outpt \"power port\"    UPPER GASTROINTESTINAL ENDOSCOPY  2017    UPPER GASTROINTESTINAL ENDOSCOPY  05/17/2017    EUS    UPPER GASTROINTESTINAL ENDOSCOPY  04/06/2018       Scheduled Meds:   pantoprazole  40 mg Oral QAM AC    atorvastatin  20 mg Oral Nightly    sodium chloride flush  10 mL Intravenous 2 times per day    acetaminophen  650 mg Oral Q6H    sennosides-docusate sodium  1 tablet Oral BID    enoxaparin  40 mg Subcutaneous Daily     Continuous Infusions:   sodium chloride 125 mL/hr at 09/23/20 1232 PRN Meds:.acetaminophen, promethazine, hydrALAZINE, sodium chloride flush, promethazine **OR** ondansetron, oxyCODONE **OR** oxyCODONE, morphine **OR** morphine    Prior to Admission medications    Medication Sig Start Date End Date Taking? Authorizing Provider   omeprazole (PRILOSEC) 20 MG delayed release capsule Take 20 mg by mouth daily   Yes Historical Provider, MD   lidocaine-prilocaine (EMLA) 2.5-2.5 % cream Apply topically as needed for Pain Apply topically as needed. Yes Historical Provider, MD   trastuzumab (HERCEPTIN) 440 MG chemo injection Infuse 2 mg/kg intravenously once Every three weeks   Yes Historical Provider, MD   medical marijuana Take by mouth as needed. Yes Historical Provider, MD   oxyCODONE-acetaminophen (PERCOCET) 5-325 MG per tablet Take 1 tablet by mouth every 6 hours as needed for Pain for up to 5 days. Intended supply: 5 days.  Take lowest dose possible to manage pain 9/21/20 9/26/20 Yes Mendez Mederos MD   senna-docusate (PERICOLACE) 8.6-50 MG per tablet Take 1 tablet by mouth 2 times daily 9/21/20  Yes Mendez Mederos MD   sildenafil (VIAGRA) 100 MG tablet Take by mouth every morning    Yes Historical Provider, MD   ferrous sulfate (IRON 325) 325 (65 Fe) MG tablet Take 325 mg by mouth daily (with breakfast)   Yes Historical Provider, MD   atorvastatin (LIPITOR) 20 MG tablet 20 mg nightly  4/13/17  Yes Historical Provider, MD   Multiple Vitamins-Minerals (CENTRUM SILVER PO) Take by mouth   Yes Historical Provider, MD   Misc Natural Products (PROSTATE SUPPORT PO) Take by mouth Indications: super beta prostate   Yes Historical Provider, MD   metoclopramide (REGLAN) 10 MG tablet Take 10 mg by mouth 3 times daily (before meals)    Historical Provider, MD   ondansetron (ZOFRAN-ODT) 8 MG disintegrating tablet Take 8 mg by mouth every 6 hours as needed for Nausea or Vomiting    Historical Provider, MD   lidocaine viscous-aluminum & magnesium hydroxide-simethicone-diphenhydrAMINE-nystatin Take 5-10 mLs by mouth every 2 hours as needed (as needed for sore throat) 6/14/17   Rakesh Richard MD   prochlorperazine (COMPAZINE) 5 MG tablet Take 1 tablet by mouth every 6 hours as needed for Nausea 5/26/17   ELISEO Cabral CNP        Allergies:  Patient has no known allergies. Social History:   Social History     Socioeconomic History    Marital status:      Spouse name: None    Number of children: None    Years of education: None    Highest education level: None   Occupational History    None   Social Needs    Financial resource strain: None    Food insecurity     Worry: None     Inability: None    Transportation needs     Medical: None     Non-medical: None   Tobacco Use    Smoking status: Former Smoker     Packs/day: 0.50     Years: 10.00     Pack years: 5.00     Types: Cigarettes    Smokeless tobacco: Never Used   Substance and Sexual Activity    Alcohol use: Yes     Comment: occasionally     Drug use: Never    Sexual activity: None   Lifestyle    Physical activity     Days per week: None     Minutes per session: None    Stress: None   Relationships    Social connections     Talks on phone: None     Gets together: None     Attends Islam service: None     Active member of club or organization: None     Attends meetings of clubs or organizations: None     Relationship status: None    Intimate partner violence     Fear of current or ex partner: None     Emotionally abused: None     Physically abused: None     Forced sexual activity: None   Other Topics Concern    None   Social History Narrative    None       Family History:    History reviewed. No pertinent family history.     Review of Systems:  CONSTITUTIONAL:  Negative except as above  HEENT:  negative  RESPIRATORY:  negative  CARDIOVASCULAR:  negative  GASTROINTESTINAL:  negative except as above   GENITOURINARY:  negative  HEMATOLOGIC/LYMPHATIC:  negative  NEUROLOGICAL: Negative      Vital Signs:  Patient Vitals for the past 24 hrs:   BP Temp Temp src Pulse Resp SpO2   20 1745 (!) 176/87 -- -- 92 16 --   20 1700 (!) 178/84 98.3 °F (36.8 °C) Oral 87 16 95 %   20 1600 (!) 199/94 -- -- 99 18 95 %   20 1510 (!) 185/84 -- -- 85 -- --   20 1330 (!) 180/88 98.4 °F (36.9 °C) Oral 74 16 96 %   20 1322 -- -- -- -- -- 96 %   20 1130 (!) 165/88 99 °F (37.2 °C) Oral 61 16 96 %   20 1030 (!) 210/95 99.2 °F (37.3 °C) Oral 63 16 --   20 0900 (!) 164/77 -- -- 58 16 96 %   20 0745 (!) 203/92 98.2 °F (36.8 °C) Oral 92 16 95 %   20 0645 (!) 185/87 -- -- 62 -- --   20 0406 (!) 189/89 98.7 °F (37.1 °C) Oral 81 16 95 %   20 2245 (!) 164/73 98.9 °F (37.2 °C) Oral 63 16 95 %      TEMPERATURE HISTORY 24H: Temp (24hrs), Av.7 °F (37.1 °C), Min:98.2 °F (36.8 °C), Max:99.2 °F (37.3 °C)    BLOOD PRESSURE HISTORY: Systolic (67SIN), REW:063 , Min:149 , UUL:024    Diastolic (89XVJ), FFF:67, Min:70, Max:95    Admission Weight: 164 lb (74.4 kg)       Intake/Output Summary (Last 24 hours) at 2020  Last data filed at 2020 1756  Gross per 24 hour   Intake 4368 ml   Output 1940 ml   Net 2428 ml     Drain/tube Output:         Physical Exam:  CONSTITUTIONAL:  alert, no apparent distress   NEUROLOGIC:  Mental Status Exam:  Level of Alertness:   awake  Orientation:  Oriented to person, place, time  EYES:  sclera clear  HENT:  normocepalic, without obvious abnormality  NECK:  supple, symmetrical, trachea midline  LUNGS:  clear to auscultation  CARDIOVASCULAR:  regular rate and rhythm   ABDOMEN: soft, non-distended, appropriate incisional tenderness without peritonitis, exam is not consistent with perforated bowel  SKIN:  no bruising or bleeding, normal skin color, texture, turgor and no redness, warmth, or swelling      Labs:    CBC:    Recent Labs     20  0716   WBC 13.4*   HGB 14.3   HCT 42.3        BMP:    Recent colon is unremarkable. Peritoneum/Retroperitoneum: Visualized aorta is unremarkable in appearance. No suspicious retroperitoneal adenopathy noted. Bones/Soft Tissues: Mild enlargement of the right-sided rectus muscle noted   which may be postsurgical in nature.  Air within the anterior abdominal wall   compatible with recent surgery. Osseous structures demonstrate no acute abnormality. Impression:      Postsurgical changes from recent right nephrectomy. There is a small to moderate size right sided hydropneumothorax similar given   differences in the technique to the comparison.  Recommend continued   follow-up. Postsurgical changes from gastric pull-through are noted.  Oral contrast   within the esophagus gastric pull-through and proximal small bowel   demonstrates no obvious evidence of extravasation to suggest perforation. Air surrounding the stomach and gastric pull-through favored to represent   extension of air from recent nephrectomy. Additional follow-up can be obtained if clinically indicated. Ct Abdomen Pelvis Wo Contrast Additional Contrast? None    Result Date: 9/23/2020  EXAMINATION: CT OF THE CHEST WITHOUT CONTRAST; CT OF THE ABDOMEN AND PELVIS WITHOUT CONTRAST 9/23/2020 6:39 pm TECHNIQUE: CT of the chest was performed without the administration of intravenous contrast. Multiplanar reformatted images are provided for review. Dose modulation, iterative reconstruction, and/or weight based adjustment of the mA/kV was utilized to reduce the radiation dose to as low as reasonably achievable.; CT of the abdomen and pelvis was performed without the administration of intravenous contrast. Multiplanar reformatted images are provided for review. Dose modulation, iterative reconstruction, and/or weight based adjustment of the mA/kV was utilized to reduce the radiation dose to as low as reasonably achievable. COMPARISON: None.  HISTORY: ORDERING SYSTEM PROVIDED HISTORY: perforation TECHNOLOGIST PROVIDED HISTORY: Reason for exam:->perforation Reason for Exam: perforation Acuity: Acute Type of Exam: Initial; ORDERING SYSTEM PROVIDED HISTORY: perforation TECHNOLOGIST PROVIDED HISTORY: Reason for exam:->perforation Additional Contrast?->None Reason for Exam: perforation Acuity: Acute Type of Exam: Initial FINDINGS: Mediastinum: No mediastinal or hilar masses. Status post gastric pull-up. No abnormal fluid seen around the stomach but there are gas bubbles around the stomach external to the lumen. No pericardial effusion. Lungs/pleura: Right basilar pneumothorax. No acute airspace disease. Central airways are patent. Organs: The liver and gallbladder appear normal.  Status post right nephrectomy. The left kidney, adrenal glands and pancreas appear unremarkable. The spleen appears unremarkable. Bowel: Bowel loops are not dilated. Extraluminal gas is seen anteriorly in the abdomen centrally and on the right side. No mass lesions. Retroperitoneum: Atherosclerotic changes. Gas seen near right nephrectomy site. Atherosclerotic changes. Pelvis: No mass lesions. No abnormal fluid collections. Prostatic calcifications. Soft Tissues/Bones: No acute fracture. No lytic or blastic lesions. Gas in the soft tissues of the anterior chest wall on the right side and in the right flank as well as the right anterior abdominal and pelvic walls. 1. Right basilar pneumothorax. Gas seen adjacent to the stomach in the chest.  A perforation of the stomach or esophagus could not be excluded. A gastric graft and study of the esophagus and stomach is suggested. 2. Extraluminal gas seen anteriorly in the abdomen. Most of this is central and on the right side. Perforated bowel could give this appearance. Gas from the recent right nephrectomy would not typically be in this location. Again this could be related to perforation of the esophagus or the gastric pull-up 3.  Status post right nephrectomy 4. Gas seen in the anterior chest, abdominal and pelvic walls most likely related to the nephrectomy 5. Findings have been discussed with Dr. Frances Scott at 7:45 p.m. on 09/23/2020     Xr Chest (2 Vw)    Result Date: 9/23/2020  EXAMINATION: TWO XRAY VIEWS OF THE CHEST 9/23/2020 3:24 pm COMPARISON: None. HISTORY: ORDERING SYSTEM PROVIDED HISTORY: possible pneumothorax TECHNOLOGIST PROVIDED HISTORY: Reason for exam:->possible pneumothorax Reason for Exam: possible pneumothorax Acuity: Acute Type of Exam: Initial FINDINGS: There is a small pneumothorax at the right lung base in the costophrenic sulcus. A trace amount of fluid is also present. This represents a small potentially loculated hydropneumothorax. In addition there is apparent intraperitoneal free air under the diaphragm partly surrounding the upper and lateral portion of the liver. Some air is also seen in the anterior abdominal wall and to a lesser degree lateral lower chest and upper abdomen adjacent to the lower lateral rib margin. No definite rib fracture is seen. Deformity from separation 6th rib likely relates to old surgery. Lungs are somewhat hyperinflated. Left lung base demonstrates patchy density and blunting of the costophrenic sulcus. Prior gastric pull-through procedure performed, as best seen on prior CT scan. Port catheter remains in good position. Confirmation of a hydropneumothorax possibly loculated at the lung base but also with suspected intraperitoneal free air and subcutaneous emphysema anterior abdominal wall extending laterally to the right. Given the evidence of prior esophageal pull-through procedure, perforation allowing for air accumulation above and below the diaphragm to the right is therefore considered. RECOMMENDATION: Recommend surgical consultation. Consider follow-up chest, abdomen and pelvic CT.  The findings were sent to the Radiology Results [address] at 3:45 pm on 9/23/2020to be communicated to a licensed caregiver. Xr Abdomen (kub) (single Ap View)    Result Date: 9/23/2020  EXAMINATION: ONE SUPINE XRAY VIEW(S) OF THE ABDOMEN 9/23/2020 10:06 am COMPARISON: None. HISTORY: ORDERING SYSTEM PROVIDED HISTORY: Abdominal pain TECHNOLOGIST PROVIDED HISTORY: Reason for exam:->Abdominal pain Reason for Exam: Abdominal pain Acuity: Unknown Type of Exam: Unknown FINDINGS: Unremarkable bowel gas pattern moderate amount of retained stool in the right side of the colon. Patient a history of prior gastric pull-through. Linear lucency adjacent to the lower ribs on the right. This may represent subcutaneous emphysema. Deep sulcus sign suggested on the right possibility of pneumothorax in the right base cannot be excluded on this study. New unremarkable bowel gas pattern. Lucency adjacent to the ribs in the subcutaneous tissues may represent subcutaneous emphysema there appears to be a deep sulcus sign on the top of the film raising the possibility of a right basilar pneumothorax. RECOMMENDATION: Further evaluation of the chest is recommended to exclude pneumothorax     Ct Chest Wo Contrast    Result Date: 9/23/2020  EXAMINATION: CT OF THE CHEST WITHOUT CONTRAST; CT OF THE ABDOMEN AND PELVIS WITHOUT CONTRAST 9/23/2020 6:39 pm TECHNIQUE: CT of the chest was performed without the administration of intravenous contrast. Multiplanar reformatted images are provided for review. Dose modulation, iterative reconstruction, and/or weight based adjustment of the mA/kV was utilized to reduce the radiation dose to as low as reasonably achievable.; CT of the abdomen and pelvis was performed without the administration of intravenous contrast. Multiplanar reformatted images are provided for review. Dose modulation, iterative reconstruction, and/or weight based adjustment of the mA/kV was utilized to reduce the radiation dose to as low as reasonably achievable. COMPARISON: None.  HISTORY: ORDERING SYSTEM PROVIDED HISTORY: 4. Gas seen in the anterior chest, abdominal and pelvic walls most likely related to the nephrectomy 5. Findings have been discussed with Dr. Justice Lewis at 7:45 p.m. on 09/23/2020       Cultures:  Relevant cultures documented under results section     Assessment:  Patient Active Problem List   Diagnosis    Malignant neoplasm of lower third of esophagus ([facility] [address]    CINV (chemotherapy-induced nausea and vomiting)    Encounter for antineoplastic chemotherapy and immunotherapy    Hematemesis    Renal mass    CKD (chronic kidney disease) stage 3, GFR 30-59 ml/min (HCC)    HTN (hypertension)    Hyponatremia    Acute kidney injury superimposed on CKD ([facility] [address]     Recent robot assisted laparoscopic right nephrectomy  History of esophageal malignancy s/p resection  Pneumothorax  Pneumoperitoneum    Plan:  1. Abdominal exam benign, no signs of toxicity, does not need surgical exploration from general surgery standpoint, CT reassuring and contrast does not show any extravasation in the chest or abdomen  2. Pneumoperitoneum likely residual from recent laparoscopy, does not need intervention  3. Pneumothorax likely related to recent laparoscopy and distorted hiatus from prior esophageal resection, will defer specific management of hydropneumothorax to thoracic surgery team  4. Keep NPO, monitor hiccups and nausea  5. Pain medication and antiemetics as needed with caution as may mask worsening exam  6. Defer management of remainder of medical comorbidities to primary and consulting teams    This plan was discussed at length with the patient. He was understanding and in agreement with the plan  Thank you for the consult and allowing me to participate in the care of this patient. I look forward to following him this admission    Cholo Berger MD, FACS  9/23/2020  8:47 PM

## 2020-09-24 NOTE — PROGRESS NOTES
3:13 PM  Received report. Patient reports passing flatus. Some nausea no vomiting. Belly is soft. Patient on RA. No shortness of breath. BP still elevated. Will give PRN hydralazine. On clears. Patient ambulating frequently. Will continue to monitor. 6:13 PM  In to check on patient. Patient reports voiding several times today. Reminded patient to void in the urinal so we could measure output. Patient denies any other needs at this time.

## 2020-09-24 NOTE — PROGRESS NOTES
Occupational Therapy   Occupational Therapy Initial Assessment/Discharge summary  Date: 2020   Patient Name: Florentin Hatfield  MRN: [id number]     : 1960    Date of Service: 2020    Discharge Recommendations: Florentin Hatfield scored a /21 on the AM-PAC ADL Inpatient form. At this time, no further OT is recommended upon discharge due to patient presenting at baseline. Recommend patient returns to prior setting with prior services. Assessment   Treatment Diagnosis: Renal mass  Prognosis: Good  Decision Making: Low Complexity  OT Education: OT Role;Plan of Care;Energy Conservation  Patient Education: Evaluation, OT role, plan of care, energy conservation, discharge recommendations - patient verbalised understanding  REQUIRES OT FOLLOW UP: No  Activity Tolerance  Activity Tolerance: Patient Tolerated treatment well  Activity Tolerance: Patient with no reports of lightheaded or dizziness  Safety Devices  Safety Devices in place: Yes  Type of devices: All fall risk precautions in place; Bed alarm in place;Call light within reach;Gait belt;Left in chair;Nurse notified  Restraints  Initially in place: No           Patient Diagnosis(es): The primary encounter diagnosis was Preop testing. A diagnosis of Renal mass was also pertinent to this visit. has a past medical history of Esophageal cancer ([facility] [address] Hyperlipidemia, and Prostate enlargement. has a past surgical history that includes Upper gastrointestinal endoscopy (); Colonoscopy; Upper gastrointestinal endoscopy (2017); Tunneled venous port placement (Right, 2018); Upper gastrointestinal endoscopy (2018); Esophagus surgery; and Kidney removal (Right, 2020). Treatment Diagnosis: Renal mass      Restrictions  Position Activity Restriction  Other position/activity restrictions:  The patient is a 62 yo  [place] male with pmh significant for hyperlipidemia, renal insufficiency,bph, hx of esophageal cancer who underwent right robotic radical nephrectomy for renal cell ca on 9/21/20 with Dr. Jaida Abrams. The patient did fair following surgery although having some issues with nausea, lack of appetite and now elevated blood pressure. He does not have previous hx of elevated blood pressure. Subjective   General  Chart Reviewed: Yes  Patient assessed for rehabilitation services?: Yes  Additional Pertinent Hx: Mr. Maira Bundy is a 63 y.o. male who underwent robot assisted laparoscopic radical right nephrectomy on 9/21/20 for renal mass. Since he's had mild nausea, dry heaves, hiccups and prompted chest and abdominal X-ray that show pneumoperitoneum and right pneumothorax. General surgery is consulted at this point. Otherwise, the patient admits to minimal and appropriate incisional pain, denies chest pain, dyspnea, cough, dysuria, fevers, chills, other complaints. Medical and surgical history significant for esophageal cancer s/p resection.   Family / Caregiver Present: No  Diagnosis: Renal mass  Subjective  Subjective: Patient supine in bed upon arrival, agreeable to therapy  Patient Currently in Pain: Denies  Pain Assessment  Pain Assessment: 0-10  Pain Level: 0  Patient's Stated Pain Goal: No pain  Vital Signs  Patient Currently in Pain: Denies  Social/Functional History  Social/Functional History  Lives With: Spouse  Type of Home: House  Home Layout: Multi-level  Home Access: Stairs to enter without rails  Entrance Stairs - Number of Steps: 1  Bathroom Shower/Tub: Walk-in shower  Bathroom Toilet: Standard  Receives Help From: Family(wife and daughter)  ADL Assistance: Independent  Homemaking Assistance: Independent  Homemaking Responsibilities: Yes  Ambulation Assistance: Independent  Transfer Assistance: Independent  Active : Yes  Mode of Transportation: Car  Occupation: Retired  Type of occupation: [employer]  Leisure & Hobbies: yardwork and aquariums  Additional Comments: No falls reported in the last 6 months Objective        Orientation  Overall Orientation Status: Within Functional Limits  Observation/Palpation  Posture: Good  Balance  Sitting Balance: Independent  Standing Balance: Stand by assistance  Standing Balance  Time: ~10-15 minutes  Activity: Ambulating throughout room, bathroom, hallway and to the therapy gym to practice stairs ~300 ft  Functional Mobility  Functional - Mobility Device: No device  Activity: Other; To/From therapy gym(Ambulating throughout room, bathroom, hallway and to the therapy gym to practice stairs ~300 ft)  Assist Level: Stand by assistance  Functional Mobility Comments: Patient with SBA for functional mobility with no device used for ambulating  ADL  Toileting: Supervision(Patient with sup for toileting tasks)  Tone RUE  RUE Tone: Normotonic  Tone LUE  LUE Tone: Normotonic  Coordination  Movements Are Fluid And Coordinated: Yes     Bed mobility  Supine to Sit: Independent  Scooting: Independent  Transfers  Sit to stand: Supervision(Sup for sit to stand)  Stand to sit: Supervision(Sup for stand to sit)  Transfer Comments: Patient with no reports of lightheaded or dizziness  Vision - Basic Assessment  Prior Vision: No visual deficits  Visual History: No significant visual history  Patient Visual Report: No visual complaint reported. Oculo Motor Control:  WNL  Cognition  Overall Cognitive Status: WFL  Perception  Overall Perceptual Status: WFL     Sensation  Overall Sensation Status: WFL        LUE AROM (degrees)  LUE AROM : WFL  Left Hand AROM (degrees)  Left Hand AROM: WFL  RUE AROM (degrees)  RUE AROM : WFL  Right Hand AROM (degrees)  Right Hand AROM: WFL  LUE Strength  Gross LUE Strength: WFL  L Hand General: 5/5  RUE Strength  Gross RUE Strength: WFL  R Hand General: 5/5     Hand Dominance  Hand Dominance: Right     Plan   Plan  Times per week: 1-2x/wk  Times per day: Daily    AM-PAC Score   AM-[facility] Inpatient Daily Activity Raw Score: 24 (09/24/20 1140)  AM-PAC Inpatient ADL

## 2020-09-24 NOTE — PROGRESS NOTES
Urology Progress Note  [facility]     Patient: Kate Colvin MRN: [id number]  Room/Bed: [facility]8796/8179-85   YOB: 1960  Age/Sex: 63 y.o.male  Admission Date: 9/21/2020     Date of Service:  9/24/2020    ASSESSMENT/PLAN     Right renal mass s/p R kulwinder nephrectomy 9/21  -Ileus still without bowel movement or flatus, dry heaves this morning  -Abdominal exam soft, appropriately tender to palpation, nondistended, incisions are all clean dry and intact  Hypertension  PTX and   - Gen surg and CT surgery consults: per note \"intra-abdominal and intra-thoracic pneumatosis 2 days after robotic right radical nephrectomy in afebrile patient with WBC 13.4 and h/o esophagectomy with gastric pull-through at [facility] for Stage IV esophageal cancer. CT scan personally reviewed.     D/W Dr. Daija Vega and abdomen benign. Agree with swallow, but patient's hiatus enlarged for gastric pull through, so pneumatosis and pneumothorax  most likely from insufflation at time of robotic nephrectomy. \"   ROCAEL on CKD  -Dr. Erlin Roque PCP following, appreciate medicine assistance, hydralazine  Catheter out and voiding    Now having BF with +BM, slow advance diet -- gen surg requested NPO today given hiccups/dry heaves above imaging findings    Recommendations:  Continue bowel regimen, follow-up resolution of ileus then discharged home  Appreciate med/CT surg/GS/pulm recs  Out of bed, incentive spirometer, standard postoperative pathway recovery    All patient questions were answered. He understands the plan as listed above. SUBJECTIVE     Chief Complaint: Follow-up right nephrectomy    24 Hour Events: Today patient reports + bowel movement with no more dry heaves this morning. Workup yesterday as above for air in abd / PTX. Otherwise symptoms are overall improved and pain is adequately controlled. Denies nausea/vomiting. No other genitourinary symptoms.     OBJECTIVE     Hospital Problem List:  Principal Problem:    Renal mass  Active Problems:    Malignant neoplasm of lower third of esophagus (HCC)    CKD (chronic kidney disease) stage 3, GFR 30-59 ml/min (HCC)    HTN (hypertension)    Hyponatremia    Acute kidney injury superimposed on CKD ([address]  Resolved Problems:    * No resolved hospital problems. *      Physical Exam:  Vitals:    09/24/20 0531   BP: (!) 158/80   Pulse:    Resp:    Temp:    SpO2:      CONSTITUTIONAL: The patient is well nourished/developed, with no distress noted. NEUROLOGICAL/PSYCHIATRIC: Oriented to place and time, normal affected noted. CARDIOVASCULAR: Regular rate and rhythm, no evidence of swelling noted. RESPIRATORY: Normal respiratory effort with no wheezing noted. ABDOMEN: Abdomen soft, non-tender, non-distended. No enlarged liver or spleen. No hernias noted. Incisions clean dry intact  GENITOURINARY: No CVA tenderness bilaterally. Ins/Outs:    Intake/Output Summary (Last 24 hours) at 9/24/2020 0738  Last data filed at 9/24/2020 0545  Gross per 24 hour   Intake 3235 ml   Output 2440 ml   Net 795 ml       Labs:  CBC   Lab Results   Component Value Date    WBC 12.8 09/24/2020    RBC 4.62 09/24/2020    RBC 4.56 07/20/2017    HGB 14.9 09/24/2020    HCT 44.1 09/24/2020    MCV 95.5 09/24/2020    MCH 32.2 09/24/2020    MCHC 33.8 09/24/2020    RDW 13.9 09/24/2020     09/24/2020    MPV 7.9 09/24/2020     BMP   Lab Results   Component Value Date     09/24/2020    K 3.6 09/24/2020    K 3.7 04/07/2018     09/24/2020    CO2 22 09/24/2020    BUN 15 09/24/2020    CREATININE 1.7 09/24/2020    GLUCOSE 122 09/24/2020    GLUCOSE 135 07/20/2017    CALCIUM 9.2 09/24/2020     Urinalysis: No results found for: COLORU, GLUCOSEU, BLOODU, NITRU, LEUKOCYTESUR  Urine culture: No results for input(s): Donna Allison in the last 72 hours. PSA:   Lab Results   Component Value Date    PSA 1.76 04/07/2020    PSA 1.24 04/02/2019    PSA 1.27 10/12/2017        Imaging:  No results found.          Electronically

## 2020-09-24 NOTE — PROGRESS NOTES
bowel sounds present     Labs:  CBC:    Recent Labs     09/22/20  0716 09/24/20  0608   WBC 13.4* 12.8*   HGB 14.3 14.9   HCT 42.3 44.1    165     BMP:    Recent Labs     09/22/20  0716 09/24/20  0608   * 135*   K 4.0 3.6   CL 98* 100   CO2 25 22   BUN 18 15   CREATININE 1.8* 1.7*   GLUCOSE 138* 122*     Hepatic:    Recent Labs     09/24/20  0608   AST 57*   ALT 23   BILITOT 0.5   ALKPHOS 99     Amylase:  No results found for: AMYLASE  Lipase:  No results found for: LIPASE   Mag:    Lab Results   Component Value Date    MG 1.90 09/24/2020    MG 1.90 04/07/2018     Phos:     Lab Results   Component Value Date    PHOS 2.8 09/24/2020      Coags:   Lab Results   Component Value Date    PROTIME 11.7 09/24/2020    INR 1.01 09/24/2020    APTT 27.0 09/24/2020       Cultures:  Anaerobic culture  No results found for: LABANAE  Fungus stain  No results found for requested labs within last 30 days. Gram stain  No results found for requested labs within last 30 days. Organism  No results found for: [facility]  Surgical culture  No results found for: CXSURG  Blood culture 1  No results found for requested labs within last 30 days. Blood culture 2  No results found for requested labs within last 30 days. Fecal occult  No results found for requested labs within last 30 days. GI bacterial pathogens by PCR  No results found for requested labs within last 30 days. C. difficile  No results found for requested labs within last 30 days.      Urine culture  No results found for: Talha He    Pathology:  \"Relevant pathology documented under results section     Imaging:  I have personally reviewed the following films:    Ct Abdomen Pelvis Wo Contrast Additional Contrast? None    Result Date: 9/23/2020  EXAMINATION: CT OF THE CHEST WITHOUT CONTRAST; CT OF THE ABDOMEN AND PELVIS WITHOUT CONTRAST 9/23/2020 6:39 pm TECHNIQUE: CT of the chest was performed without the administration of intravenous contrast. Multiplanar abdominal and pelvic walls. 1. Right basilar pneumothorax. Gas seen adjacent to the stomach in the chest.  A perforation of the stomach or esophagus could not be excluded. A gastric graft and study of the esophagus and stomach is suggested. 2. Extraluminal gas seen anteriorly in the abdomen. Most of this is central and on the right side. Perforated bowel could give this appearance. Gas from the recent right nephrectomy would not typically be in this location. Again this could be related to perforation of the esophagus or the gastric pull-up 3. Status post right nephrectomy 4. Gas seen in the anterior chest, abdominal and pelvic walls most likely related to the nephrectomy 5. Findings have been discussed with Dr. Pricila Albrecht at 7:45 p.m. on 09/23/2020     Ct Abdomen Wo Contrast Additional Contrast? Oral    Result Date: 9/24/2020  EXAMINATION: CT OF THE CHEST WITHOUT CONTRAST; CT ABDOMEN WITHOUT CONTRAST 9/23/2020 10:21 pm TECHNIQUE: CT of the chest was performed without the administration of intravenous contrast. Multiplanar reformatted images are provided for review. Dose modulation, iterative reconstruction, and/or weight based adjustment of the mA/kV was utilized to reduce the radiation dose to as low as reasonably achievable.; CT of the abdomen was performed without the administration of intravenous contrast. Multiplanar reformatted images are provided for review. Dose modulation, iterative reconstruction, and/or weight based adjustment of the mA/kV was utilized to reduce the radiation dose to as low as reasonably achievable. COMPARISON: Previous same day and prior HISTORY: ORDERING SYSTEM PROVIDED HISTORY: free air seen in previous CT and esophageal perforation TECHNOLOGIST PROVIDED HISTORY: with oral contrast given during exam Reason for exam:->free air seen in previous CT and esophageal perforation Reason for Exam: free air seen in previous CT and esophageal perforation.  Acuity: Acute Type of Exam: Initial; ORDERING SYSTEM PROVIDED HISTORY: free air seen in previous CT and esophageal perforation TECHNOLOGIST PROVIDED HISTORY: With oral contrast given during exam Reason for exam:->free air seen in previous CT and esophageal perforation Additional Contrast?->Oral Reason for Exam: free air seen in previous CT and esophageal perforation. Acuity: Acute Type of Exam: Initial FINDINGS: Chest: Mediastinum: Oral contrast is noted in the esophagus and gastric pull-through extending into the upper abdomen. Contrast is noted within the proximal small bowel. There is no obvious extravasation to suggest a site of perforation. Right IJ port is in place. Heart size appears stable. Aorta and origin of the great vessels is unremarkable. The main pulmonary artery is normal in caliber. Lungs/pleura: Central airways are patent. Small to moderate-sized pneumothorax demonstrates no significant change in appearance. Small amount of fluid at the right lung base appears stable. Volume loss is noted at the right lung base. Air adjacent to the esophagus and gastric pull-through are noted. Motion limited imaging of the left lung appears stable with volume loss in perihilar opacities noted at the left lung base. There is a small left-sided pleural effusion. Soft Tissues/Bones: Osseous structures appear stable. Abdomen/Pelvis: Free air is noted beneath the diaphragm similar in its appearance to the prior study. Organs: Limited noncontrast imaging of the liver demonstrates a low-attenuation lesion inferiorly unchanged measuring 0.5 cm. Gallbladder, adrenal glands, left kidney, spleen and pancreas are grossly unremarkable in appearance. Patient is status post right nephrectomy. Postsurgical changes at the right renal fossa noted. GI/Bowel: Gastric pull-through noted as above. Oral contrast is seen extending into the proximal small bowel. The visualized small bowel demonstrates no acute abnormality. Visualized colon is unremarkable. demonstrates patchy density and blunting of the costophrenic sulcus. Prior gastric pull-through procedure performed, as best seen on prior CT scan. Port catheter remains in good position. Confirmation of a hydropneumothorax possibly loculated at the lung base but also with suspected intraperitoneal free air and subcutaneous emphysema anterior abdominal wall extending laterally to the right. Given the evidence of prior esophageal pull-through procedure, perforation allowing for air accumulation above and below the diaphragm to the right is therefore considered. RECOMMENDATION: Recommend surgical consultation. Consider follow-up chest, abdomen and pelvic CT. The findings were sent to the Radiology Results [address] at 3:45 pm on 9/23/2020to be communicated to a licensed caregiver. Xr Abdomen (kub) (single Ap View)    Result Date: 9/23/2020  EXAMINATION: ONE SUPINE XRAY VIEW(S) OF THE ABDOMEN 9/23/2020 10:06 am COMPARISON: None. HISTORY: ORDERING SYSTEM PROVIDED HISTORY: Abdominal pain TECHNOLOGIST PROVIDED HISTORY: Reason for exam:->Abdominal pain Reason for Exam: Abdominal pain Acuity: Unknown Type of Exam: Unknown FINDINGS: Unremarkable bowel gas pattern moderate amount of retained stool in the right side of the colon. Patient a history of prior gastric pull-through. Linear lucency adjacent to the lower ribs on the right. This may represent subcutaneous emphysema. Deep sulcus sign suggested on the right possibility of pneumothorax in the right base cannot be excluded on this study. New unremarkable bowel gas pattern. Lucency adjacent to the ribs in the subcutaneous tissues may represent subcutaneous emphysema there appears to be a deep sulcus sign on the top of the film raising the possibility of a right basilar pneumothorax.  RECOMMENDATION: Further evaluation of the chest is recommended to exclude pneumothorax     Ct Chest Wo Contrast    Result Date: 9/24/2020  EXAMINATION: CT OF THE CHEST WITHOUT CONTRAST; CT ABDOMEN WITHOUT CONTRAST 9/23/2020 10:21 pm TECHNIQUE: CT of the chest was performed without the administration of intravenous contrast. Multiplanar reformatted images are provided for review. Dose modulation, iterative reconstruction, and/or weight based adjustment of the mA/kV was utilized to reduce the radiation dose to as low as reasonably achievable.; CT of the abdomen was performed without the administration of intravenous contrast. Multiplanar reformatted images are provided for review. Dose modulation, iterative reconstruction, and/or weight based adjustment of the mA/kV was utilized to reduce the radiation dose to as low as reasonably achievable. COMPARISON: Previous same day and prior HISTORY: ORDERING SYSTEM PROVIDED HISTORY: free air seen in previous CT and esophageal perforation TECHNOLOGIST PROVIDED HISTORY: with oral contrast given during exam Reason for exam:->free air seen in previous CT and esophageal perforation Reason for Exam: free air seen in previous CT and esophageal perforation. Acuity: Acute Type of Exam: Initial; ORDERING SYSTEM PROVIDED HISTORY: free air seen in previous CT and esophageal perforation TECHNOLOGIST PROVIDED HISTORY: With oral contrast given during exam Reason for exam:->free air seen in previous CT and esophageal perforation Additional Contrast?->Oral Reason for Exam: free air seen in previous CT and esophageal perforation. Acuity: Acute Type of Exam: Initial FINDINGS: Chest: Mediastinum: Oral contrast is noted in the esophagus and gastric pull-through extending into the upper abdomen. Contrast is noted within the proximal small bowel. There is no obvious extravasation to suggest a site of perforation. Right IJ port is in place. Heart size appears stable. Aorta and origin of the great vessels is unremarkable. The main pulmonary artery is normal in caliber. Lungs/pleura: Central airways are patent.  Small to moderate-sized pneumothorax demonstrates from recent nephrectomy. Additional follow-up can be obtained if clinically indicated. Ct Chest Wo Contrast    Result Date: 9/23/2020  EXAMINATION: CT OF THE CHEST WITHOUT CONTRAST; CT OF THE ABDOMEN AND PELVIS WITHOUT CONTRAST 9/23/2020 6:39 pm TECHNIQUE: CT of the chest was performed without the administration of intravenous contrast. Multiplanar reformatted images are provided for review. Dose modulation, iterative reconstruction, and/or weight based adjustment of the mA/kV was utilized to reduce the radiation dose to as low as reasonably achievable.; CT of the abdomen and pelvis was performed without the administration of intravenous contrast. Multiplanar reformatted images are provided for review. Dose modulation, iterative reconstruction, and/or weight based adjustment of the mA/kV was utilized to reduce the radiation dose to as low as reasonably achievable. COMPARISON: None. HISTORY: ORDERING SYSTEM PROVIDED HISTORY: perforation TECHNOLOGIST PROVIDED HISTORY: Reason for exam:->perforation Reason for Exam: perforation Acuity: Acute Type of Exam: Initial; ORDERING SYSTEM PROVIDED HISTORY: perforation TECHNOLOGIST PROVIDED HISTORY: Reason for exam:->perforation Additional Contrast?->None Reason for Exam: perforation Acuity: Acute Type of Exam: Initial FINDINGS: Mediastinum: No mediastinal or hilar masses. Status post gastric pull-up. No abnormal fluid seen around the stomach but there are gas bubbles around the stomach external to the lumen. No pericardial effusion. Lungs/pleura: Right basilar pneumothorax. No acute airspace disease. Central airways are patent. Organs: The liver and gallbladder appear normal.  Status post right nephrectomy. The left kidney, adrenal glands and pancreas appear unremarkable. The spleen appears unremarkable. Bowel: Bowel loops are not dilated. Extraluminal gas is seen anteriorly in the abdomen centrally and on the right side. No mass lesions.  Retroperitoneum: Atherosclerotic changes. Gas seen near right nephrectomy site. Atherosclerotic changes. Pelvis: No mass lesions. No abnormal fluid collections. Prostatic calcifications. Soft Tissues/Bones: No acute fracture. No lytic or blastic lesions. Gas in the soft tissues of the anterior chest wall on the right side and in the right flank as well as the right anterior abdominal and pelvic walls. 1. Right basilar pneumothorax. Gas seen adjacent to the stomach in the chest.  A perforation of the stomach or esophagus could not be excluded. A gastric graft and study of the esophagus and stomach is suggested. 2. Extraluminal gas seen anteriorly in the abdomen. Most of this is central and on the right side. Perforated bowel could give this appearance. Gas from the recent right nephrectomy would not typically be in this location. Again this could be related to perforation of the esophagus or the gastric pull-up 3. Status post right nephrectomy 4. Gas seen in the anterior chest, abdominal and pelvic walls most likely related to the nephrectomy 5. Findings have been discussed with Dr. Oanh Babb at 7:45 p.m. on 09/23/2020     Xr Chest Portable    Result Date: 9/24/2020  EXAMINATION: ONE XRAY VIEW OF THE CHEST 9/24/2020 5:43 am COMPARISON: 09/23/2020 radiograph HISTORY: ORDERING SYSTEM PROVIDED HISTORY: pneumothorax TECHNOLOGIST PROVIDED HISTORY: Reason for exam:->pneumothorax Reason for Exam: pneumothorax Acuity: Unknown Type of Exam: Unknown FINDINGS: Relatively stable appearance of right pneumothorax with no significant fluid on current radiograph. This may indicate improved status of hydropneumothorax described on prior CT imaging. Left lung is well expanded and clear. Heart and mediastinum are normal.  Right port catheter unchanged in position. Skeletal structures are unremarkable. Relatively stable appearance of right pneumothorax. No significant fluid component on current plain film.        Scheduled Meds:   cloNIDine  1 patch Transdermal Weekly    pantoprazole  40 mg Oral QAM AC    atorvastatin  20 mg Oral Nightly    sodium chloride flush  10 mL Intravenous 2 times per day    acetaminophen  650 mg Oral Q6H    sennosides-docusate sodium  1 tablet Oral BID    enoxaparin  40 mg Subcutaneous Daily     Continuous Infusions:   sodium chloride 125 mL/hr at 09/23/20 1749     PRN Meds:.acetaminophen, promethazine, hydrALAZINE, iohexol, sodium chloride flush, promethazine **OR** ondansetron, oxyCODONE **OR** oxyCODONE, morphine **OR** morphine      Assessment:  63 y.o. male admitted with   1. Preop testing    2. Renal mass        Recent robot assisted laparoscopic right nephrectomy  History of esophageal malignancy s/p resection  Pneumothorax  Pneumoperitoneum       Plan:  1. Benign abdominal exam, passing flatus, no nausea/vomiting; no plans for surgical intervention  2. Advance diet as tolerated from a general surgical perspective  3. IV hydration until PO intake is adequate; monitor and correct electrolytes  4. Activity as tolerated  5. PRN analgesics and antiemetics--minimizing narcotics as tolerated  6. Management of medical comorbid etiologies per primary team and consulting services    EDUCATION:  Educated patient on plan of care and disease process--all questions answered. Wilfred Lord is stable from surgical standpoint. Will follow as needed. Please call with questions or concerns. Thank you for allowing us to participate in [address]. Plans discussed with patient and nursing. Reviewed and discussed with Dr. Fabiola Bar. Signed:  ELISEO De Paz - CNP  9/24/2020 11:08 AM    I have personally performed a face to face diagnostic evaluation on this patient. I have interviewed and examined the patient and I agree with the assessment above.  In summary, my findings and plan are the following:   Mr. Anyi Bowers is a 63 y.o. male who presents with   Recent robot assisted laparoscopic right nephrectomy  History of esophageal malignancy s/p resection  Pneumothorax  Pneumoperitoneum     Plan:  1. Abdominal exam benign, no signs of toxicity, does not need surgical exploration from general surgery standpoint, CT reassuring and contrast does not show any extravasation in the chest or abdomen  2. Pneumoperitoneum likely residual from recent laparoscopy, does not need intervention  3. Defer management of hydropneumothorax to thoracic surgery  4. May advance diet from general surgery standpoint  5. Defer management of remainder of medical comorbidities to primary and consulting teams  6. Will follow peripherally, please call with questions, can follow electively after discharge as needed    [address] [facility] ANGELICA Key MD, FACS  9/24/2020  6:17 PM

## 2020-09-24 NOTE — CONSULTS
PULMONARY AND CRITICAL CARE CONSULTATION NOTE  REASON FOR CONSULT: No chief complaint on file. DATE OF CONSULT: 9/24/2020    HISTORY OF PRESENT ILLNESS: 63y.o. year old male with past medical history of stage IV esophageal cancer status post gastric pull-through at [facility] 3 years ago, now on chemotherapy who was admitted to hospital for elective right nephrectomy performed on 9/21/2020. Post surgery, patient has been having issues with dry heaving and retching. He is also been having constipation and is not moving his bowels well. Patient continued to have these episodes yesterday for which an x-ray KUB was performed that showed possible right basilar pneumothorax which was confirmed on chest x-ray. Patient was also noted to have air under the right keyanna-diaphragm. Pulmonary was consulted for management of right pneumothorax. During all this time, patient has been asymptomatic. He denies only incision site discomfort. He denies any shortness of breath or cough. He is not hypoxic. He does have some nausea and dry heaving but has not been vomiting. He is not passing gas and has not had a bowel movement. Patient had multiple imaging performed after that including CT abdomen and chest without contrast which was followed by CT abdomen with contrast to rule out any esophageal perforation. CT abdomen with contrast did not show any evidence of viscus perforation. Thorax and pneumoperitoneum is likely secondary to right nephrectomy performed 3 days ago. REVIEW OF SYSTEMS:   CONSTITUTIONAL SYMPTOMS: The patient denies fever, fatigue, night sweats, weight loss or weight gain. HEENT: No vision changes. No tinnitus, Denies sinus pain. No hoarseness, or dysphagia. NECK: Patient denies swelling in the neck. CARDIOVASCULAR: Denies chest pain, palpitation, syncope. RESPIRATORY: Denies shortness of breath or cough.    GASTROINTESTINAL: Denies abdominal pain or change in bowel injection Infuse 2 mg/kg intravenously once Every three weeks      medical marijuana Take by mouth as needed.  sildenafil (VIAGRA) 100 MG tablet Take by mouth every morning       ferrous sulfate (IRON 325) 325 (65 Fe) MG tablet Take 325 mg by mouth daily (with breakfast)      atorvastatin (LIPITOR) 20 MG tablet 20 mg nightly       Multiple Vitamins-Minerals (CENTRUM SILVER PO) Take by mouth      Misc Natural Products (PROSTATE SUPPORT PO) Take by mouth Indications: super beta prostate      metoclopramide (REGLAN) 10 MG tablet Take 10 mg by mouth 3 times daily (before meals)      ondansetron (ZOFRAN-ODT) 8 MG disintegrating tablet Take 8 mg by mouth every 6 hours as needed for Nausea or Vomiting      lidocaine viscous-aluminum & magnesium hydroxide-simethicone-diphenhydrAMINE-nystatin Take 5-10 mLs by mouth every 2 hours as needed (as needed for sore throat) 480 mL 5    prochlorperazine (COMPAZINE) 5 MG tablet Take 1 tablet by mouth every 6 hours as needed for Nausea 120 tablet 3        cloNIDine  1 patch Transdermal Weekly    pantoprazole  40 mg Oral QAM AC    atorvastatin  20 mg Oral Nightly    sodium chloride flush  10 mL Intravenous 2 times per day    acetaminophen  650 mg Oral Q6H    sennosides-docusate sodium  1 tablet Oral BID    enoxaparin  40 mg Subcutaneous Daily      sodium chloride 125 mL/hr at 09/24/20 1135     acetaminophen, promethazine, hydrALAZINE, iohexol, sodium chloride flush, promethazine **OR** ondansetron, oxyCODONE **OR** oxyCODONE, morphine **OR** morphine    ALLERGIES:   Allergies as of 09/01/2020    (No Known Allergies)      OBJECTIVE:   height is 5' 11\" (1.803 m) and weight is 158 lb 8 oz (71.9 kg). His oral temperature is 98.8 °F (37.1 °C). His blood pressure is 181/95 (abnormal) and his pulse is 103. His respiration is 18 and oxygen saturation is 99%. No intake/output data recorded.      PHYSICAL EXAM:    CONSTITUTIONAL: He is a 63y.o.-year-old who appears his stated age. He is alert and oriented x 3 and in no acute distress. HEENT: PERRLA, EOMI. No scleral icterus. No thrush, atraumatic, normocephalic. NECK: Supple, without cervical or supraclavicular lymphadenopathy:  CARDIOVASCULAR: S1 S2 RRR. Without murmer  RESPIRATORY & CHEST: Lungs are clear to auscultation and percussion. No wheezing, no crackles. Good air movement  GASTROINTESTINAL & ABDOMEN: Soft, nontender, decreased bowel sounds in all quadrants, non-distended, without hepatosplenomegaly. GENITOURINARY: Deferred. MUSCULOSKELETAL: No tenderness to palpation of the axial skeleton. There is no clubbing. No cyanosis. No edema of the lower extremities. SKIN OF BODY: No rash or jaundice. PSYCHIATRIC EVALUATION: Normal affect. Patient answers questions appropriately. HEMATOLOGIC/LYMPHATIC/ IMMUNOLOGIC: No palpable lymphadenopathy. NEUROLOGIC: Alert and oriented x 3. Groslly non-focal. Motor strength is 5+/5 in all muscle groups. The patient has a normal sensorium globally. LABS:  Lab Results   Component Value Date    WBC 12.8 (H) 09/24/2020    HGB 14.9 09/24/2020    HCT 44.1 09/24/2020     09/24/2020    CHOL 111 10/08/2019    TRIG 43 10/08/2019    HDL 50 04/07/2020    ALT 23 09/24/2020    AST 57 (H) 09/24/2020     (L) 09/24/2020    K 3.6 09/24/2020     09/24/2020    CREATININE 1.7 (H) 09/24/2020    BUN 15 09/24/2020    CO2 22 09/24/2020    TSH 0.94 04/07/2020    PSA 1.76 04/07/2020    INR 1.01 09/24/2020    GLUF 104 (H) 04/07/2020       Lab Results   Component Value Date    GLUCOSE 122 (H) 09/24/2020    CALCIUM 9.2 09/24/2020     (L) 09/24/2020    K 3.6 09/24/2020    CO2 22 09/24/2020     09/24/2020    BUN 15 09/24/2020    CREATININE 1.7 (H) 09/24/2020         IMAGING:  I reviewed the chest x-ray from 9/23/2020 and my interpretation is as follows. Right basilar loculated pneumothorax. I reviewed the chest x-ray from 9/24/2020 and my interpretation is as follows. Right basilar loculated pneumothorax which is unchanged and stable. IMPRESSION:   Right basilar pneumothorax  Pneumoperitoneum  Recent robotic assisted laparoscopic right nephrectomy  History of stage IV esophageal cancer status post gastric pull-through at [facility], now on chemotherapy      RECOMMENDATION:   Patient's imaging has been stable. It has been determined that right basilar pneumothorax and pneumoperitoneum is secondary to recent robotic assisted laparoscopic right nephrectomy. Today's chest x-ray shows stable pneumothorax. Patient is asymptomatic. Maintaining good sats on room air. I would recommend conservative management and follow-up. Thank you for your consultation. We will continue to follow the patient. Guru Albarado MD  Pulmonary Critical Care and Sleep Medicine  9/24/2020, 12:40 PM    This note was completed using dragon medical speech recognition software. Grammatical errors, random word insertions, pronoun errors and incomplete sentences are occasional consequences of this technology due to software limitations. If there are questions or concerns about the content of this note of information contained within the body of this dictation they should be addressed with the provider for clarification.

## 2020-09-24 NOTE — PROGRESS NOTES
CC: intra-abdominal and intra-thoracic pneumatosis 2 days after robotic right radical nephrectomy in afebrile patient with WBC 13.4 and h/o esophagectomy with gastric pull-through at [facility] for Stage IV esophageal cancer. CT scan personally reviewed. D/W Dr. Hemalatha Jacome and abdomen benign. Agree with swallow, but patient's hiatus enlarged for gastric pull through, so pneumatosis and pneumothorax  most likely from insufflation at time of robotic nephrectomy. Will follow.

## 2020-09-24 NOTE — CARE COORDINATION
Aware that pt's dc order was held d/t not eating and drinking much after nephrectomy-n/v when attempting to eat, no BM, HTN. [employer] CM Jase Velasquez [phone] called for update-left message providing update on pt. DC plan for home w/no CM needs.   Electronically signed by JIA Hill on 9/24/2020 at 11:11 AM

## 2020-09-24 NOTE — PROGRESS NOTES
09/24/2020    MPV 7.9 09/24/2020     CMP:    Lab Results   Component Value Date     09/24/2020    K 3.6 09/24/2020    K 3.7 04/07/2018     09/24/2020    CO2 22 09/24/2020    BUN 15 09/24/2020    CREATININE 1.7 09/24/2020    GFRAA 50 09/24/2020    GFRAA >60 01/22/2013    AGRATIO 1.1 09/24/2020    LABGLOM 41 09/24/2020    GLUCOSE 122 09/24/2020    GLUCOSE 135 07/20/2017    PROT 7.6 09/24/2020    PROT 7.4 07/20/2017    LABALBU 3.9 09/24/2020    CALCIUM 9.2 09/24/2020    BILITOT 0.5 09/24/2020    ALKPHOS 99 09/24/2020    AST 57 09/24/2020    ALT 23 09/24/2020       ASSESSMENT      Patient Active Problem List   Diagnosis    Malignant neoplasm of lower third of esophagus (HCC)    CINV (chemotherapy-induced nausea and vomiting)    Encounter for antineoplastic chemotherapy and immunotherapy    Hematemesis    Renal mass    CKD (chronic kidney disease) stage 3, GFR 30-59 ml/min (HCC)    Hypertension    Hyponatremia    Acute kidney injury superimposed on CKD ([facility] [address]    Hydropneumothorax         PLAN  1. The patient is currently n.p.o. per surgery  2. Pulmonology consulted  3. The patient was started on Catapres patch and continue hydralazine on a p.r.n. basis  4.  Recheck labs in a.m., renal function stable

## 2020-09-25 ENCOUNTER — APPOINTMENT (OUTPATIENT)
Dept: GENERAL RADIOLOGY | Age: 60
DRG: 657 | End: 2020-09-25
Attending: UROLOGY
Payer: COMMERCIAL

## 2020-09-25 ENCOUNTER — TELEPHONE (OUTPATIENT)
Dept: PULMONOLOGY | Age: 60
End: 2020-09-25

## 2020-09-25 LAB
ANION GAP SERPL CALCULATED.3IONS-SCNC: 9 MMOL/L (ref 3–16)
BASOPHILS ABSOLUTE: 0 K/UL (ref 0–0.2)
BASOPHILS RELATIVE PERCENT: 0.5 %
BUN BLDV-MCNC: 14 MG/DL (ref 7–20)
CALCIUM SERPL-MCNC: 9 MG/DL (ref 8.3–10.6)
CHLORIDE BLD-SCNC: 101 MMOL/L (ref 99–110)
CO2: 24 MMOL/L (ref 21–32)
CREAT SERPL-MCNC: 1.8 MG/DL (ref 0.8–1.3)
EOSINOPHILS ABSOLUTE: 0.1 K/UL (ref 0–0.6)
EOSINOPHILS RELATIVE PERCENT: 0.6 %
GFR AFRICAN AMERICAN: 47
GFR NON-AFRICAN AMERICAN: 38
GLUCOSE BLD-MCNC: 103 MG/DL (ref 70–99)
HCT VFR BLD CALC: 41.2 % (ref 40.5–52.5)
HEMOGLOBIN: 14.5 G/DL (ref 13.5–17.5)
LYMPHOCYTES ABSOLUTE: 0.7 K/UL (ref 1–5.1)
LYMPHOCYTES RELATIVE PERCENT: 7.7 %
MCH RBC QN AUTO: 33.2 PG (ref 26–34)
MCHC RBC AUTO-ENTMCNC: 35.1 G/DL (ref 31–36)
MCV RBC AUTO: 94.6 FL (ref 80–100)
MONOCYTES ABSOLUTE: 1.2 K/UL (ref 0–1.3)
MONOCYTES RELATIVE PERCENT: 12.5 %
NEUTROPHILS ABSOLUTE: 7.6 K/UL (ref 1.7–7.7)
NEUTROPHILS RELATIVE PERCENT: 78.7 %
PDW BLD-RTO: 13.8 % (ref 12.4–15.4)
PLATELET # BLD: 164 K/UL (ref 135–450)
PMV BLD AUTO: 7.3 FL (ref 5–10.5)
POTASSIUM SERPL-SCNC: 3.6 MMOL/L (ref 3.5–5.1)
RBC # BLD: 4.36 M/UL (ref 4.2–5.9)
SARS-COV-2, NAA: NOT DETECTED
SODIUM BLD-SCNC: 134 MMOL/L (ref 136–145)
WBC # BLD: 9.6 K/UL (ref 4–11)

## 2020-09-25 PROCEDURE — 85025 COMPLETE CBC W/AUTO DIFF WBC: CPT

## 2020-09-25 PROCEDURE — 80048 BASIC METABOLIC PNL TOTAL CA: CPT

## 2020-09-25 PROCEDURE — 1200000000 HC SEMI PRIVATE

## 2020-09-25 PROCEDURE — 6370000000 HC RX 637 (ALT 250 FOR IP): Performed by: INTERNAL MEDICINE

## 2020-09-25 PROCEDURE — 36415 COLL VENOUS BLD VENIPUNCTURE: CPT

## 2020-09-25 PROCEDURE — 6370000000 HC RX 637 (ALT 250 FOR IP): Performed by: UROLOGY

## 2020-09-25 PROCEDURE — 99232 SBSQ HOSP IP/OBS MODERATE 35: CPT | Performed by: INTERNAL MEDICINE

## 2020-09-25 PROCEDURE — 2580000003 HC RX 258: Performed by: INTERNAL MEDICINE

## 2020-09-25 PROCEDURE — 71045 X-RAY EXAM CHEST 1 VIEW: CPT

## 2020-09-25 PROCEDURE — 6360000002 HC RX W HCPCS: Performed by: UROLOGY

## 2020-09-25 RX ORDER — AMLODIPINE BESYLATE 5 MG/1
5 TABLET ORAL 2 TIMES DAILY
Status: DISCONTINUED | OUTPATIENT
Start: 2020-09-25 | End: 2020-09-26 | Stop reason: HOSPADM

## 2020-09-25 RX ADMIN — PANTOPRAZOLE SODIUM 40 MG: 40 TABLET, DELAYED RELEASE ORAL at 05:22

## 2020-09-25 RX ADMIN — SODIUM CHLORIDE: 9 INJECTION, SOLUTION INTRAVENOUS at 21:31

## 2020-09-25 RX ADMIN — SODIUM CHLORIDE: 9 INJECTION, SOLUTION INTRAVENOUS at 04:06

## 2020-09-25 RX ADMIN — STANDARDIZED SENNA CONCENTRATE AND DOCUSATE SODIUM 1 TABLET: 8.6; 5 TABLET ORAL at 22:16

## 2020-09-25 RX ADMIN — ACETAMINOPHEN 650 MG: 325 TABLET ORAL at 05:21

## 2020-09-25 RX ADMIN — ATORVASTATIN CALCIUM 20 MG: 20 TABLET, FILM COATED ORAL at 22:16

## 2020-09-25 RX ADMIN — AMLODIPINE BESYLATE 5 MG: 5 TABLET ORAL at 07:39

## 2020-09-25 RX ADMIN — STANDARDIZED SENNA CONCENTRATE AND DOCUSATE SODIUM 1 TABLET: 8.6; 5 TABLET ORAL at 07:39

## 2020-09-25 RX ADMIN — ACETAMINOPHEN 650 MG: 325 TABLET ORAL at 16:51

## 2020-09-25 RX ADMIN — ACETAMINOPHEN 650 MG: 325 TABLET ORAL at 10:29

## 2020-09-25 RX ADMIN — ENOXAPARIN SODIUM 40 MG: 40 INJECTION SUBCUTANEOUS at 07:39

## 2020-09-25 RX ADMIN — OXYCODONE 5 MG: 5 TABLET ORAL at 15:02

## 2020-09-25 RX ADMIN — AMLODIPINE BESYLATE 5 MG: 5 TABLET ORAL at 22:19

## 2020-09-25 RX ADMIN — ACETAMINOPHEN 650 MG: 325 TABLET ORAL at 22:17

## 2020-09-25 ASSESSMENT — PAIN SCALES - GENERAL
PAINLEVEL_OUTOF10: 4
PAINLEVEL_OUTOF10: 6
PAINLEVEL_OUTOF10: 0
PAINLEVEL_OUTOF10: 6
PAINLEVEL_OUTOF10: 7
PAINLEVEL_OUTOF10: 7

## 2020-09-25 ASSESSMENT — PAIN - FUNCTIONAL ASSESSMENT: PAIN_FUNCTIONAL_ASSESSMENT: ACTIVITIES ARE NOT PREVENTED

## 2020-09-25 ASSESSMENT — PAIN DESCRIPTION - LOCATION
LOCATION: ABDOMEN
LOCATION: ABDOMEN

## 2020-09-25 ASSESSMENT — PAIN DESCRIPTION - DESCRIPTORS
DESCRIPTORS: SORE
DESCRIPTORS: CRAMPING

## 2020-09-25 ASSESSMENT — PAIN DESCRIPTION - ORIENTATION
ORIENTATION: MID
ORIENTATION: MID

## 2020-09-25 ASSESSMENT — PAIN DESCRIPTION - PAIN TYPE
TYPE: SURGICAL PAIN
TYPE: SURGICAL PAIN

## 2020-09-25 ASSESSMENT — PAIN DESCRIPTION - ONSET: ONSET: ON-GOING

## 2020-09-25 ASSESSMENT — PAIN DESCRIPTION - FREQUENCY
FREQUENCY: INTERMITTENT
FREQUENCY: INTERMITTENT

## 2020-09-25 NOTE — PLAN OF CARE
Problem: Pain:  Goal: Pain level will decrease  Description: Pain level will decrease  Outcome: Ongoing  Goal: Control of acute pain  Description: Control of acute pain  Outcome: Ongoing  Goal: Control of chronic pain  Description: Control of chronic pain  Outcome: Ongoing     Problem: HEMODYNAMIC STATUS  Goal: Patient has stable vital signs and fluid balance  Outcome: Ongoing     Problem: OXYGENATION/RESPIRATORY FUNCTION  Goal: Patient will achieve/maintain normal respiratory rate/effort  Outcome: Ongoing     Problem: MOBILITY  Goal: Early mobilization is achieved  Outcome: Ongoing     Problem: ELIMINATION  Goal: Elimination patterns are normal or improving  Description: Elimination patterns return to pre-surgery normal patterns  Outcome: Ongoing

## 2020-09-25 NOTE — PROGRESS NOTES
7:47 AM  Morning assessment complete and am meds given. Patient denies any pain at this time. Not able to eat much. Passing gas. No BM yet. Clears for breakfast. The care plan and education has been reviewed and mutually agreed upon with the patient. 10:15 AM  Ambulated with patient in the hallway. HE tolerated it well. Linens changed. Patient given supplies for shower. He will call when his wife gets here to assist him.

## 2020-09-25 NOTE — TELEPHONE ENCOUNTER
Order for CXR placed. Will call pt and ask for him to have this done on same day as his appointment with Dr Amy Aranda 1 hour prior.

## 2020-09-25 NOTE — PROGRESS NOTES
Urology Progress Note  [facility]    Provider: Breanne Alexis MD Patient ID:  Admission Date: 2020 Name: Janay Lugo  OR Date: 2020 MRN: [id number]   Patient Location: [address] [place], [place] [id number] : 1960  Attending: Breanne Alexis MD Date of Service: 2020  PCP: Jean Gentile MD     ASSESSMENT/PLAN      Right renal mass s/p R kulwinder nephrectomy . Post op Hypertension and small PTX being conservatively managed. Patient feeling much better and eager for dc. Still with some nausea but no emesis. Denies any respiratory symptoms    - discussed pathology today of ccRCC T1b  - will coordinate outpt follow up imaging with Dr. Tania Valadez  - Appreciate consulting teams input  - continue HTN control  - regular diet as tolerated, OOB  - hopefully home in next day or so  - Pulm f/u with CXR in 1 week per note       All patient questions were answered. He understands the plan as listed above. Objective:   Vitals:  Vitals:    20 1000   BP: (!) 159/86   Pulse: 82   Resp:    Temp:    SpO2:        Intake/Output Summary (Last 24 hours) at 2020 1228  Last data filed at 2020 0747  Gross per 24 hour   Intake 1613 ml   Output 1450 ml   Net 163 ml       Physical Exam:  Gen: Alert and oriented x3, no acute distress  CV: Regular rate   Resp: unlabored respirations  Abd: Soft, non-distended, appropriately tender, no masses  Ext: Warm and well perfused, no peripheral edema noted  Wound:  Incision clean/dry/intact  Labs:  Lab Results   Component Value Date    WBC 9.6 2020    HGB 14.5 2020    HCT 41.2 2020    MCV 94.6 2020     2020     Lab Results   Component Value Date    CREATININE 1.8 (H) 2020    BUN 14 2020     (L) 2020    K 3.6 2020     2020    CO2 24 2020       Breanne Alexis MD  2020

## 2020-09-25 NOTE — PROGRESS NOTES
PULMONARY AND CRITICAL CARE MEDICINE PROGRESS NOTE      SUBJECTIVE: Patient is seen at bedside. He is sitting comfortably in the chair. Denies any shortness of breath or cough. Still states that he does not have much appetite. HPI: 63y.o. year old male with past medical history of stage IV esophageal cancer status post gastric pull-through at [facility] 3 years ago, now on chemotherapy who was admitted to hospital for elective right nephrectomy performed on 9/21/2020. Post surgery, patient has been having issues with dry heaving and retching. He is also been having constipation and is not moving his bowels well. Patient continued to have these episodes yesterday for which an x-ray KUB was performed that showed possible right basilar pneumothorax which was confirmed on chest x-ray. Patient was also noted to have air under the right keyanna-diaphragm. Pulmonary was consulted for management of right pneumothorax. During all this time, patient has been asymptomatic. He denies only incision site discomfort. He denies any shortness of breath or cough. He is not hypoxic. He does have some nausea and dry heaving but has not been vomiting. He is not passing gas and has not had a bowel movement. Patient had multiple imaging performed after that including CT abdomen and chest without contrast which was followed by CT abdomen with contrast to rule out any esophageal perforation. CT abdomen with contrast did not show any evidence of viscus perforation. Thorax and pneumoperitoneum is likely secondary to right nephrectomy performed 3 days ago. REVIEW OF SYSTEMS:   CONSTITUTIONAL SYMPTOMS: The patient denies fever, fatigue, night sweats, weight loss or weight gain. HEENT: No vision changes. No tinnitus, Denies sinus pain. No hoarseness, or dysphagia. CARDIOVASCULAR: Denies chest pain, palpitation, syncope. RESPIRATORY: Denies shortness of breath or cough.    GASTROINTESTINAL: Denies abdominal pain or change in bowel function. SKIN: No rashes or itching. MUSCULOSKELETAL: Denies weakness or bone pain. NEUROLOGICAL: No headaches or seizures. MEDICATIONS:      cloNIDine  1 patch Transdermal Weekly    amLODIPine  5 mg Oral Daily    pantoprazole  40 mg Oral QAM AC    atorvastatin  20 mg Oral Nightly    sodium chloride flush  10 mL Intravenous 2 times per day    acetaminophen  650 mg Oral Q6H    sennosides-docusate sodium  1 tablet Oral BID    enoxaparin  40 mg Subcutaneous Daily      sodium chloride 125 mL/hr at 09/25/20 0406     acetaminophen, promethazine, hydrALAZINE, iohexol, sodium chloride flush, promethazine **OR** ondansetron, oxyCODONE **OR** oxyCODONE, morphine **OR** morphine     ALLERGIES:   Allergies as of 09/01/2020    (No Known Allergies)        OBJECTIVE:   height is 5' 11\" (1.803 m) and weight is 158 lb 8 oz (71.9 kg). His oral temperature is 98.3 °F (36.8 °C). His blood pressure is 159/86 (abnormal) and his pulse is 82. His respiration is 16 and oxygen saturation is 95%. I/O this shift:  In: -   Out: 450 [Urine:450]     PHYSICAL EXAM:    CONSTITUTIONAL: He is a 63y.o.-year-old who appears his stated age. He is alert and oriented x 3 and in no acute distress. CARDIOVASCULAR: S1 S2 RRR. Without murmer  RESPIRATORY & CHEST: Lungs are clear to auscultation and percussion. No wheezing, no crackles. Good air movement  GASTROINTESTINAL & ABDOMEN: Soft, nontender, positive bowel sounds in all quadrants, non-distended, without hepatosplenomegaly. GENITOURINARY: Deferred. MUSCULOSKELETAL: No tenderness to palpation of the axial skeleton. There is no clubbing. No cyanosis. No edema of the lower extremities. SKIN OF BODY: No rash or jaundice. PSYCHIATRIC EVALUATION: Normal affect. Patient answers questions appropriately. HEMATOLOGIC/LYMPHATIC/ IMMUNOLOGIC: No palpable lymphadenopathy. NEUROLOGIC: Alert and oriented x 3. Groslly non-focal. Motor strength is 5+/5 in all muscle groups. The patient has a normal sensorium globally. LABS:   Lab Results   Component Value Date    WBC 9.6 09/25/2020    HGB 14.5 09/25/2020    HCT 41.2 09/25/2020     09/25/2020    CHOL 111 10/08/2019    TRIG 43 10/08/2019    HDL 50 04/07/2020    ALT 23 09/24/2020    AST 57 (H) 09/24/2020     (L) 09/25/2020    K 3.6 09/25/2020     09/25/2020    CREATININE 1.8 (H) 09/25/2020    BUN 14 09/25/2020    CO2 24 09/25/2020    TSH 0.94 04/07/2020    PSA 1.76 04/07/2020    INR 1.01 09/24/2020    GLUF 104 (H) 04/07/2020       Lab Results   Component Value Date    GLUCOSE 103 (H) 09/25/2020    CALCIUM 9.0 09/25/2020     (L) 09/25/2020    K 3.6 09/25/2020    CO2 24 09/25/2020     09/25/2020    BUN 14 09/25/2020    CREATININE 1.8 (H) 09/25/2020       Lab Results   Component Value Date    GLUCOSE 103 (H) 09/25/2020    CALCIUM 9.0 09/25/2020     (L) 09/25/2020    K 3.6 09/25/2020    CO2 24 09/25/2020     09/25/2020    BUN 14 09/25/2020    CREATININE 1.8 (H) 09/25/2020       IMAGING:  I reviewed the chest x-ray from 9/23/2020 and my interpretation is as follows. Right basilar loculated pneumothorax. I reviewed the chest x-ray from 9/24/2020 and my interpretation is as follows. Right basilar loculated pneumothorax which is unchanged and stable. I have reviewed the chest x-ray from 9/25/2020 and my interpretation is as follows: Right basilar loculated pneumothorax which is unchanged and stable.        IMPRESSION:   Right basilar pneumothorax  Pneumoperitoneum  Recent robotic assisted laparoscopic right nephrectomy  History of stage IV esophageal cancer status post gastric pull-through at [facility], now on chemotherapy        RECOMMENDATION:   Patient's imaging has remained stable. It has been determined that right basilar pneumothorax and pneumoperitoneum is secondary to recent robotic assisted laparoscopic right nephrectomy.   Today's chest x-ray

## 2020-09-25 NOTE — PROGRESS NOTES
Department of Internal Medicine  Progress Note      SUBJECTIVE:  Denies any chest pain or shortness of breath, very poor appetite    Review of Systems - General ROS:denies any headache or weakness    Respiratory ROS: denies any shortness of breath, dyspnea on exertion, wheezing, or cough   Cardiovascular ROS: denies any chest pain, palpitations, shortness of breath, dizziness syncope or swelling in extremities  Gastrointestinal ROS: denies any abdominal pain, acid reflux, change in bowel habits or blood in stool, dark or tarry stools       OBJECTIVE:      PHYSICAL:   VITALS:  BP (!) 159/80   Pulse 80   Temp 98.2 °F (36.8 °C) (Oral)   Resp 16   Ht 5' 11\" (1.803 m)   Wt 158 lb 8 oz (71.9 kg)   SpO2 95%   BMI 22.11 kg/m²   PULSE OXIMETRY RANGE: SpO2  Av.8 %  Min: 95 %  Max: 98 %    Intake/Output Summary (Last 24 hours) at 2020 1438  Last data filed at 2020 0747  Gross per 24 hour   Intake 1613 ml   Output 1450 ml   Net 163 ml      CONSTITUTIONAL:  awake, alert, cooperative, no apparent distress, and appears stated age  NECK:  Supple, symmetrical, trachea midline, no adenopathy, thyroid symmetric, not enlarged and no tenderness, skin normal  LUNGS:  No increased work of breathing, good air exchange, clear to auscultation bilaterally, no crackles or wheezing  CARDIOVASCULAR:  normal apical pulses  ABDOMEN:  normal bowel sounds, soft, non-distended, non-tender, no masses palpated, no hepatosplenomegally  NEUROLOGIC:  Awake, alert, oriented to name, place and time. Cranial nerves II-XII are grossly intact. Motor is 5 out of 5 bilaterally. Cerebellar finger to nose, heel to shin intact. Sensory is intact.   Babinski down going, Romberg negative, and gait is normal.  EDEMA: Normal    Data      CBC:   Lab Results   Component Value Date    WBC 9.6 2020    RBC 4.36 2020    RBC 4.56 2017    HGB 14.5 2020    HCT 41.2 2020    MCV 94.6 2020    MCH 33.2 2020    MCH 35.1 09/25/2020    RDW 13.8 09/25/2020     09/25/2020    MPV 7.3 09/25/2020     CMP:    Lab Results   Component Value Date     09/25/2020    K 3.6 09/25/2020    K 3.7 04/07/2018     09/25/2020    CO2 24 09/25/2020    BUN 14 09/25/2020    CREATININE 1.8 09/25/2020    GFRAA 47 09/25/2020    GFRAA >60 01/22/2013    AGRATIO 1.1 09/24/2020    LABGLOM 38 09/25/2020    GLUCOSE 103 09/25/2020    GLUCOSE 135 07/20/2017    PROT 7.6 09/24/2020    PROT 7.4 07/20/2017    LABALBU 3.9 09/24/2020    CALCIUM 9.0 09/25/2020    BILITOT 0.5 09/24/2020    ALKPHOS 99 09/24/2020    AST 57 09/24/2020    ALT 23 09/24/2020       ASSESSMENT      Patient Active Problem List   Diagnosis    Malignant neoplasm of lower third of esophagus (HCC)    CINV (chemotherapy-induced nausea and vomiting)    Preop testing    Hematemesis    Renal mass    CKD (chronic kidney disease) stage 3, GFR 30-59 ml/min (HCC)    Hypertension    Hyponatremia    Acute kidney injury superimposed on CKD ([facility] [address]    Hydropneumothorax         PLAN  1. Continue current care, the patient is currently on Catapres patch and Norvasc 5 mg daily and continue with hydralazine on a p.r.n. basis  2. The patient was encouraged to increase ambulation  3.  Pneumothorax is stable

## 2020-09-26 ENCOUNTER — APPOINTMENT (OUTPATIENT)
Dept: GENERAL RADIOLOGY | Age: 60
DRG: 657 | End: 2020-09-26
Attending: UROLOGY
Payer: COMMERCIAL

## 2020-09-26 VITALS
TEMPERATURE: 98.7 F | HEIGHT: 71 IN | SYSTOLIC BLOOD PRESSURE: 149 MMHG | OXYGEN SATURATION: 97 % | DIASTOLIC BLOOD PRESSURE: 87 MMHG | BODY MASS INDEX: 22.19 KG/M2 | RESPIRATION RATE: 16 BRPM | HEART RATE: 100 BPM | WEIGHT: 158.5 LBS

## 2020-09-26 LAB
ANION GAP SERPL CALCULATED.3IONS-SCNC: 9 MMOL/L (ref 3–16)
BASOPHILS ABSOLUTE: 0.1 K/UL (ref 0–0.2)
BASOPHILS RELATIVE PERCENT: 0.7 %
BUN BLDV-MCNC: 12 MG/DL (ref 7–20)
CALCIUM SERPL-MCNC: 9.1 MG/DL (ref 8.3–10.6)
CHLORIDE BLD-SCNC: 99 MMOL/L (ref 99–110)
CO2: 24 MMOL/L (ref 21–32)
CREAT SERPL-MCNC: 1.6 MG/DL (ref 0.8–1.3)
EOSINOPHILS ABSOLUTE: 0.3 K/UL (ref 0–0.6)
EOSINOPHILS RELATIVE PERCENT: 3.4 %
GFR AFRICAN AMERICAN: 53
GFR NON-AFRICAN AMERICAN: 44
GLUCOSE BLD-MCNC: 100 MG/DL (ref 70–99)
HCT VFR BLD CALC: 42.8 % (ref 40.5–52.5)
HEMOGLOBIN: 14.6 G/DL (ref 13.5–17.5)
LYMPHOCYTES ABSOLUTE: 0.8 K/UL (ref 1–5.1)
LYMPHOCYTES RELATIVE PERCENT: 10.2 %
MCH RBC QN AUTO: 32.1 PG (ref 26–34)
MCHC RBC AUTO-ENTMCNC: 34.1 G/DL (ref 31–36)
MCV RBC AUTO: 94.3 FL (ref 80–100)
MONOCYTES ABSOLUTE: 1 K/UL (ref 0–1.3)
MONOCYTES RELATIVE PERCENT: 13 %
NEUTROPHILS ABSOLUTE: 5.6 K/UL (ref 1.7–7.7)
NEUTROPHILS RELATIVE PERCENT: 72.7 %
PDW BLD-RTO: 13.9 % (ref 12.4–15.4)
PLATELET # BLD: 184 K/UL (ref 135–450)
PMV BLD AUTO: 7.4 FL (ref 5–10.5)
POTASSIUM SERPL-SCNC: 3.4 MMOL/L (ref 3.5–5.1)
RBC # BLD: 4.54 M/UL (ref 4.2–5.9)
SODIUM BLD-SCNC: 132 MMOL/L (ref 136–145)
WBC # BLD: 7.7 K/UL (ref 4–11)

## 2020-09-26 PROCEDURE — 6360000002 HC RX W HCPCS: Performed by: UROLOGY

## 2020-09-26 PROCEDURE — 36415 COLL VENOUS BLD VENIPUNCTURE: CPT

## 2020-09-26 PROCEDURE — 6360000002 HC RX W HCPCS: Performed by: INTERNAL MEDICINE

## 2020-09-26 PROCEDURE — 99232 SBSQ HOSP IP/OBS MODERATE 35: CPT | Performed by: INTERNAL MEDICINE

## 2020-09-26 PROCEDURE — 6370000000 HC RX 637 (ALT 250 FOR IP): Performed by: UROLOGY

## 2020-09-26 PROCEDURE — 6370000000 HC RX 637 (ALT 250 FOR IP): Performed by: INTERNAL MEDICINE

## 2020-09-26 PROCEDURE — 2580000003 HC RX 258: Performed by: UROLOGY

## 2020-09-26 PROCEDURE — 71045 X-RAY EXAM CHEST 1 VIEW: CPT

## 2020-09-26 PROCEDURE — 2580000003 HC RX 258: Performed by: INTERNAL MEDICINE

## 2020-09-26 PROCEDURE — 85025 COMPLETE CBC W/AUTO DIFF WBC: CPT

## 2020-09-26 PROCEDURE — 80048 BASIC METABOLIC PNL TOTAL CA: CPT

## 2020-09-26 RX ADMIN — ACETAMINOPHEN 650 MG: 325 TABLET ORAL at 11:21

## 2020-09-26 RX ADMIN — ACETAMINOPHEN 650 MG: 325 TABLET ORAL at 04:39

## 2020-09-26 RX ADMIN — Medication 10 ML: at 09:27

## 2020-09-26 RX ADMIN — SODIUM CHLORIDE: 9 INJECTION, SOLUTION INTRAVENOUS at 06:02

## 2020-09-26 RX ADMIN — BISACODYL 10 MG: 5 TABLET, COATED ORAL at 09:23

## 2020-09-26 RX ADMIN — ENOXAPARIN SODIUM 40 MG: 40 INJECTION SUBCUTANEOUS at 09:23

## 2020-09-26 RX ADMIN — AMLODIPINE BESYLATE 5 MG: 5 TABLET ORAL at 09:23

## 2020-09-26 RX ADMIN — HYDRALAZINE HYDROCHLORIDE 10 MG: 20 INJECTION INTRAMUSCULAR; INTRAVENOUS at 05:34

## 2020-09-26 RX ADMIN — STANDARDIZED SENNA CONCENTRATE AND DOCUSATE SODIUM 1 TABLET: 8.6; 5 TABLET ORAL at 09:23

## 2020-09-26 RX ADMIN — PANTOPRAZOLE SODIUM 40 MG: 40 TABLET, DELAYED RELEASE ORAL at 07:01

## 2020-09-26 ASSESSMENT — PAIN SCALES - GENERAL
PAINLEVEL_OUTOF10: 6
PAINLEVEL_OUTOF10: 5
PAINLEVEL_OUTOF10: 0
PAINLEVEL_OUTOF10: 7
PAINLEVEL_OUTOF10: 5

## 2020-09-26 ASSESSMENT — PAIN DESCRIPTION - PAIN TYPE
TYPE: SURGICAL PAIN

## 2020-09-26 ASSESSMENT — PAIN DESCRIPTION - ONSET
ONSET: ON-GOING
ONSET: ON-GOING

## 2020-09-26 ASSESSMENT — PAIN DESCRIPTION - ORIENTATION
ORIENTATION: MID

## 2020-09-26 ASSESSMENT — PAIN DESCRIPTION - LOCATION
LOCATION: ABDOMEN

## 2020-09-26 ASSESSMENT — PAIN DESCRIPTION - DESCRIPTORS
DESCRIPTORS: SORE
DESCRIPTORS: SORE

## 2020-09-26 ASSESSMENT — PAIN DESCRIPTION - PROGRESSION: CLINICAL_PROGRESSION: NOT CHANGED

## 2020-09-26 ASSESSMENT — PAIN DESCRIPTION - FREQUENCY
FREQUENCY: INTERMITTENT
FREQUENCY: INTERMITTENT

## 2020-09-26 NOTE — PROGRESS NOTES
[facility] Pulmonary and Critical Care    Follow Up Note    Subjective:   CHIEF COMPLAINT / HPI: No chief complaint on file. Interval history: Patient was admitted for robotic nephrectomy. This was complicated by a small basilar right pneumothorax. Over the last several days, this has remained quite stable. He is tolerating room air. He is not short of breath or reporting any chest pain. Past Medical History:    Reviewed; no changes    Social History:    Reviewed; no changes    REVIEW OF SYSTEMS:    CONSTITUTIONAL:  negative for fevers and chills  RESPIRATORY:  See HPI  CARDIOVASCULAR:  negative for chest pain, palpitations, edema  GASTROINTESTINAL:  negative for nausea, vomiting, diarrhea, constipation and abdominal pain    Objective:   PHYSICAL EXAM:        VITALS:  BP (!) 176/73   Pulse 76   Temp 98.7 °F (37.1 °C) (Oral)   Resp 16   Ht 5' 11\" (1.803 m)   Wt 158 lb 8 oz (71.9 kg)   SpO2 97%   BMI 22.11 kg/m²  on room air     24HR INTAKE/OUTPUT:      Intake/Output Summary (Last 24 hours) at 9/26/2020 1116  Last data filed at 9/26/2020 3172  Gross per 24 hour   Intake 5163.08 ml   Output 2075 ml   Net 3088.08 ml       CONSTITUTIONAL:  awake, alert,  no apparent distress, and appears stated age  LUNGS:  No increased work of breathing and clear to auscultation, no crackles or wheezes  CARDIOVASCULAR: S1 and S2 and no JVD  ABDOMEN:  normal bowel sounds, non-distended and non-tender to palpation  EXT: No edema, no calf tenderness. Pulses are present bilaterally. NEUROLOGIC:  Mental Status Exam:  Level of Alertness:   awake  Orientation:   person, place, time.  Non focal  SKIN:  normal skin color, texture, turgor, no redness, warmth, or swelling at IV sites    DATA:    CBC:  Recent Labs     09/24/20  0608 09/25/20  0631 09/26/20  0554   WBC 12.8* 9.6 7.7   RBC 4.62 4.36 4.54   HGB 14.9 14.5 14.6   HCT 44.1 41.2 42.8    164 184   MCV 95.5 94.6 94.3   MCH 32.2 33.2 32.1   MCHC 33.8 35.1 34.1   RDW 13.9 13.8 13.9      BMP:  Recent Labs     09/24/20  0608 09/25/20  0631 09/26/20  0554   * 134* 132*   K 3.6 3.6 3.4*    101 99   CO2 22 24 24   BUN 15 14 12   CREATININE 1.7* 1.8* 1.6*   CALCIUM 9.2 9.0 9.1   GLUCOSE 122* 103* 100*      ABG:  No results for input(s): PHART, XFA7BHK, PO2ART, CWR5YYV, G9ZQEDUK, BEART in the last 72 hours. Cultures:    Abx:    Radiology Review:  Pertinent images / reports were reviewed as a part of this visit. Assessment:     1. Right basilar pneumothorax  2. Robotic assisted right nephrectomy    Plan:     1. I have reviewed laboratories, medical records and images for this visit  2. I reviewed a series of chest images revealing stable basilar right pneumothorax  3. Repeat chest x-ray today. If this remains stable, would be okay with me if he is discharged home.

## 2020-09-26 NOTE — PROGRESS NOTES
Patient provided with discharge instructions, discussed in detail, new medications reviewed including use and side effects. Patient verbalized understanding. Paper prescriptions provided to patient to fill at pharmacy of choice. All questions answered, family at the bedside to transport home. Patient discharged home on 9/26/2020 at 1405. Discharging unit phone number [phone].

## 2020-09-26 NOTE — PROGRESS NOTES
Post op nephrectomy   Pod 5  Overall doing well   Still with htn     Cr stable at 1.6   H/h stable as well  Still no bm but pos flatus and no abd distention  Plan  dulcalax tablets   Ok for discharge when ok by medicine

## 2020-09-26 NOTE — PROGRESS NOTES
Assessment complete, meds given. Lap sites clean, dry, and intact. Bowel sounds active. Patient c/o moderate pain in mid abdomen, not requesting pain medication at this time. No BM yet. BP in 150s, newly scheduled 5 mg Norvasc given per MAR. Patient intends to go home tomorrow. The care plan and education has been reviewed and mutually agreed upon with the patient. Patient in bed, bed in lowest position, call light and bedside table within reach. No further needs expressed at this time. 0045  BP in 160s. Patient asymptomatic, denies pain at this time. Per message in [facility] from hospital pharmacy, IV hydralazine is on back order and currently unavailable. Pharmacy suggested asking MD for PO order. Will contact MD.    9684  Message sent to MD Cece Elizondo, physician who ordered patient's IV hydralazine: \"Right nephrectomy on 9/21. BP has been elevated, started on Norvasc 5 mg twice daily. BP still elevated, 160s. Order for PRN hydralazine Q2H, however hospital is out of IV hydralazine. Did you want to order something else? \"    0240  Attempt made to call MD Crowell's office, unable to leave message. BP in 160s, 170s. Patient remains asymptomatic, denies pain at this time. Will continue to monitor. 0515  BP in 170s. Patient complaining of moderate abdominal pain, requesting scheduled Tylenol. Tylenol administered per MAR. Pharmacy contacted to double check if hospital is still out of IV hydralazine as it was noted that original pharmacy notification had been sent on 9/24. Per pharmacist, they have a few doses available, sending up now. 0615  PRN IV hydralazine given per [facility] at 0534. BP taken every 5 minutes until 0614, SBP ranging from 154 to 173 during this time period. Patient remains asymptomatic. No further needs expressed at this time. Will continue to monitor.

## 2020-09-26 NOTE — PROGRESS NOTES
4087: Shift assessment complete. VSS. Alert and oriented x4. Lap sites approximated, CD&I. See flowsheets. Morning medications given per MAR. The care plan and education has been reviewed and mutually agreed upon with the patient. Pt needs expressed, call light within reach, will continue to monitor.

## 2020-09-26 NOTE — PLAN OF CARE
Problem: Pain:  Goal: Pain level will decrease  Description: Pain level will decrease  9/26/2020 1114 by Greg Bernheim, RN  Outcome: Ongoing  9/26/2020 0704 by Elton Solis RN  Outcome: Ongoing  Goal: Control of acute pain  Description: Control of acute pain  9/26/2020 1114 by Greg Bernheim, RN  Outcome: Ongoing  9/26/2020 0704 by Elton Solis RN  Outcome: Ongoing  Goal: Control of chronic pain  Description: Control of chronic pain  9/26/2020 1114 by Greg Bernheim, RN  Outcome: Ongoing  9/26/2020 0704 by Elton Solis RN  Outcome: Ongoing     Problem: HEMODYNAMIC STATUS  Goal: Patient has stable vital signs and fluid balance  9/26/2020 1114 by Greg Bernheim, RN  Outcome: Ongoing  9/26/2020 0704 by Elton Solis RN  Outcome: Ongoing     Problem: OXYGENATION/RESPIRATORY FUNCTION  Goal: Patient will achieve/maintain normal respiratory rate/effort  9/26/2020 1114 by Greg Bernheim, RN  Outcome: Ongoing  9/26/2020 0704 by Elton Solis RN  Outcome: Ongoing     Problem: MOBILITY  Goal: Early mobilization is achieved  9/26/2020 1114 by Greg Bernheim, RN  Outcome: Ongoing  9/26/2020 0704 by Elton Solis RN  Outcome: Ongoing     Problem: ELIMINATION  Goal: Elimination patterns are normal or improving  Description: Elimination patterns return to pre-surgery normal patterns  9/26/2020 1114 by Greg Bernheim, RN  Outcome: Ongoing  9/26/2020 0704 by Elton Solis RN  Outcome: Ongoing

## 2020-09-28 ENCOUNTER — TELEPHONE (OUTPATIENT)
Dept: PULMONOLOGY | Age: 60
End: 2020-09-28

## 2020-09-28 NOTE — TELEPHONE ENCOUNTER
Called and spoke with pt. Asked him to come in 1 hour prior to his appointment with Dr Adarsh Jurado on 10-1-20 to get CXR done and then come down to our office for his appointment.  Pt stated he would

## 2020-10-01 ENCOUNTER — HOSPITAL ENCOUNTER (OUTPATIENT)
Dept: GENERAL RADIOLOGY | Age: 60
Discharge: HOME | End: 2020-10-01
Payer: COMMERCIAL

## 2020-10-01 ENCOUNTER — OFFICE VISIT (OUTPATIENT)
Dept: PULMONOLOGY | Age: 60
End: 2020-10-01
Payer: COMMERCIAL

## 2020-10-01 ENCOUNTER — HOSPITAL ENCOUNTER (OUTPATIENT)
Age: 60
Discharge: HOME | End: 2020-10-01
Payer: COMMERCIAL

## 2020-10-01 VITALS
DIASTOLIC BLOOD PRESSURE: 83 MMHG | RESPIRATION RATE: 18 BRPM | SYSTOLIC BLOOD PRESSURE: 152 MMHG | HEIGHT: 70 IN | BODY MASS INDEX: 21.9 KG/M2 | WEIGHT: 153 LBS | HEART RATE: 65 BPM | OXYGEN SATURATION: 99 %

## 2020-10-01 PROCEDURE — G8420 CALC BMI NORM PARAMETERS: HCPCS | Performed by: INTERNAL MEDICINE

## 2020-10-01 PROCEDURE — G8427 DOCREV CUR MEDS BY ELIG CLIN: HCPCS | Performed by: INTERNAL MEDICINE

## 2020-10-01 PROCEDURE — 3017F COLORECTAL CA SCREEN DOC REV: CPT | Performed by: INTERNAL MEDICINE

## 2020-10-01 PROCEDURE — 1111F DSCHRG MED/CURRENT MED MERGE: CPT | Performed by: INTERNAL MEDICINE

## 2020-10-01 PROCEDURE — G8484 FLU IMMUNIZE NO ADMIN: HCPCS | Performed by: INTERNAL MEDICINE

## 2020-10-01 PROCEDURE — 99213 OFFICE O/P EST LOW 20 MIN: CPT | Performed by: INTERNAL MEDICINE

## 2020-10-01 PROCEDURE — 71046 X-RAY EXAM CHEST 2 VIEWS: CPT

## 2020-10-01 PROCEDURE — 1036F TOBACCO NON-USER: CPT | Performed by: INTERNAL MEDICINE

## 2020-10-01 NOTE — PROGRESS NOTES
PULMONARY OFFICE FOLLOW UP NOTE    REASON FOR VISIT:   Chief Complaint   Patient presents with    Follow-Up from [address] [place] [address] seen in the hospital for small pneumothorax       DATE OF VISIT: 10/1/2020    HISTORY OF PRESENT ILLNESS: 63y.o. year old male is here for follow-up of pneumothorax. He has past medical history of stage IV esophageal cancer status post gastric pull-through at [facility] 3 years ago, now on chemotherapy. Patient was admitted to [facility] for elective right nephrectomy which was performed on 9/21/2020.  Post surgery, patient has been having issues with dry heaving and retching along with constipation.  Patient continued to have these episodes for which an x-ray KUB was performed that showed possible right basilar pneumothorax which was confirmed on chest x-ray.  Patient was also noted to have air under the right keyanna-diaphragm.  During all this time, patient was asymptomatic. Right basilar pneumothorax and pneumatosis was noted to be secondary to iatrogenic secondary nephrectomy.  Patient was managed conservatively. Today he comes in for a follow-up. Follow-up chest x-ray from today showed resolution of right basilar pneumothorax. He denies any pulmonary symptoms. Denies any shortness of breath or chest pain or wheezing or cough or hemoptysis. Diagnostic test reviewed:  I reviewed the chest x-ray from 10/1/2020 and my interpretation is as follows. Resolution of right basilar pneumothorax. I reviewed the chest x-ray from 9/23/2020 and my interpretation is as follows.  Right basilar loculated pneumothorax. I reviewed the chest x-ray from 9/24/2020 and my interpretation is as follows.  Right basilar loculated pneumothorax which is unchanged and stable.   I have reviewed the chest x-ray from 9/25/2020 and my interpretation is as follows: Right basilar loculated pneumothorax which is unchanged and stable    REVIEW OF SYSTEMS: 14 point ROS is negative beside mentioned in [address] CONSTITUTIONAL SYMPTOMS: The patient denies fever, fatigue, night sweats, weight loss or weight gain. HEENT: No vision changes. No tinnitus, Denies sinus pain. No hoarseness, or dysphagia. NECK: Patient denies swelling in the neck. CARDIOVASCULAR: Denies chest pain, palpitation, syncope. RESPIRATORY: Denies shortness of breath or cough. GASTROINTESTINAL: Denies nausea, abdominal pain or change in bowel function. GENITOURINARY: Denies obstructive symptoms. No history of incontinence. BREASTS: No masses or lumps in the breasts. SKIN: No rashes or itching. MUSCULOSKELETAL: Denies weakness or bone pain. NEUROLOGICAL: No headaches or seizures. PSYCHIATRIC: Denies mood swings or depression. ENDOCRINE: Denies heat or cold intolerance or excessive thirst.  HEMATOLOGIC/LYMPHATIC: Denies easy bruising or lymph node swelling. ALLERGIC/IMMUNOLOGIC: No environmental allergies.     PAST MEDICAL HISTORY:   Past Medical History:   Diagnosis Date    Esophageal cancer ([facility] [address]     poss kidney    Hyperlipidemia     Prostate enlargement        PAST SURGICAL HISTORY:   Past Surgical History:   Procedure Laterality Date    COLONOSCOPY      ESOPHAGUS SURGERY      for cancer    KIDNEY REMOVAL Right 9/21/2020    ROBOTIC LAPAROSCOPIC RIGHT NEPHRECTOMY performed by Hema Hylton MD at [place] [address] Right 02/08/2018    inserted in radiology by Dr Frederick Ledesma as outpt \"power port\"    UPPER GASTROINTESTINAL ENDOSCOPY  2017    UPPER GASTROINTESTINAL ENDOSCOPY  05/17/2017    EUS    UPPER GASTROINTESTINAL ENDOSCOPY  04/06/2018       SOCIAL HISTORY:   Social History     Tobacco Use    Smoking status: Former Smoker     Packs/day: 0.50     Years: 10.00     Pack years: 5.00     Types: Cigarettes    Smokeless tobacco: Never Used   Substance Use Topics    Alcohol use: Yes     Comment: occasionally     Drug use: Never       FAMILY HISTORY: No family history on file.    MEDICATIONS:     Current Outpatient Medications on File Prior to Visit   Medication Sig Dispense Refill    omeprazole (PRILOSEC) 20 MG delayed release capsule Take 20 mg by mouth daily      lidocaine-prilocaine (EMLA) 2.5-2.5 % cream Apply topically as needed for Pain Apply topically as needed.  trastuzumab (HERCEPTIN) 440 MG chemo injection Infuse 2 mg/kg intravenously once Every three weeks      medical marijuana Take by mouth as needed.  senna-docusate (PERICOLACE) 8.6-50 MG per tablet Take 1 tablet by mouth 2 times daily 50 tablet 0    sildenafil (VIAGRA) 100 MG tablet Take by mouth every morning       ferrous sulfate (IRON 325) 325 (65 Fe) MG tablet Take 325 mg by mouth daily (with breakfast)      metoclopramide (REGLAN) 10 MG tablet Take 10 mg by mouth 3 times daily (before meals)      ondansetron (ZOFRAN-ODT) 8 MG disintegrating tablet Take 8 mg by mouth every 6 hours as needed for Nausea or Vomiting      lidocaine viscous-aluminum & magnesium hydroxide-simethicone-diphenhydrAMINE-nystatin Take 5-10 mLs by mouth every 2 hours as needed (as needed for sore throat) 480 mL 5    prochlorperazine (COMPAZINE) 5 MG tablet Take 1 tablet by mouth every 6 hours as needed for Nausea 120 tablet 3    atorvastatin (LIPITOR) 20 MG tablet 20 mg nightly       Multiple Vitamins-Minerals (CENTRUM SILVER PO) Take by mouth      Misc Natural Products (PROSTATE SUPPORT PO) Take by mouth Indications: super beta prostate       No current facility-administered medications on file prior to visit. ALLERGIES:   Allergies as of 10/01/2020    (No Known Allergies)      OBJECTIVE:   height is 5' 10\" (1.778 m) and weight is 153 lb (69.4 kg). His blood pressure is 152/83 (abnormal) and his pulse is 65. His respiration is 18 and oxygen saturation is 99%. PHYSICAL EXAM:    CONSTITUTIONAL: He is a 63y.o.-year-old who appears his stated age.  He is alert and oriented x 3 and in no acute

## 2020-10-24 PROBLEM — Z01.818 PREOP TESTING: Status: RESOLVED | Noted: 2017-06-22 | Resolved: 2020-10-24

## 2020-11-25 ENCOUNTER — HOSPITAL ENCOUNTER (OUTPATIENT)
Dept: CT IMAGING | Age: 60
Discharge: HOME | End: 2020-11-25
Payer: COMMERCIAL

## 2020-11-25 PROCEDURE — 74176 CT ABD & PELVIS W/O CONTRAST: CPT

## 2021-01-04 ENCOUNTER — OFFICE VISIT (OUTPATIENT)
Dept: PRIMARY CARE CLINIC | Age: 61
End: 2021-01-04
Payer: COMMERCIAL

## 2021-01-04 DIAGNOSIS — Z20.828 EXPOSURE TO SARS-ASSOCIATED CORONAVIRUS: Primary | ICD-10-CM

## 2021-01-04 PROCEDURE — G8428 CUR MEDS NOT DOCUMENT: HCPCS | Performed by: NURSE PRACTITIONER

## 2021-01-04 PROCEDURE — G8420 CALC BMI NORM PARAMETERS: HCPCS | Performed by: NURSE PRACTITIONER

## 2021-01-04 PROCEDURE — 99211 OFF/OP EST MAY X REQ PHY/QHP: CPT | Performed by: NURSE PRACTITIONER

## 2021-01-04 NOTE — PROGRESS NOTES
Daniela Callaway received a viral test for COVID-19. They were educated on isolation and quarantine as appropriate. For any symptoms, they were directed to seek care from their PCP, given contact information to establish with a doctor, directed to an urgent care or the emergency room.

## 2021-01-06 LAB — SARS-COV-2, NAA: NOT DETECTED

## 2021-01-18 ENCOUNTER — OFFICE VISIT (OUTPATIENT)
Dept: PRIMARY CARE CLINIC | Age: 61
End: 2021-01-18
Payer: COMMERCIAL

## 2021-01-18 DIAGNOSIS — Z20.828 EXPOSURE TO SARS-ASSOCIATED CORONAVIRUS: Primary | ICD-10-CM

## 2021-01-18 PROCEDURE — 99211 OFF/OP EST MAY X REQ PHY/QHP: CPT | Performed by: NURSE PRACTITIONER

## 2021-01-18 PROCEDURE — G8428 CUR MEDS NOT DOCUMENT: HCPCS | Performed by: NURSE PRACTITIONER

## 2021-01-18 PROCEDURE — G8420 CALC BMI NORM PARAMETERS: HCPCS | Performed by: NURSE PRACTITIONER

## 2021-01-18 NOTE — PATIENT INSTRUCTIONS

## 2021-01-18 NOTE — PROGRESS NOTES
Zofia Wagner received a viral test for COVID-19. They were educated on isolation and quarantine as appropriate. For any symptoms, they were directed to seek care from their PCP, given contact information to establish with a doctor, directed to an urgent care or the emergency room.

## 2021-01-19 LAB — SARS-COV-2: NOT DETECTED

## 2021-03-17 ENCOUNTER — HOSPITAL ENCOUNTER (OUTPATIENT)
Dept: CT IMAGING | Age: 61
Discharge: HOME | End: 2021-03-17
Payer: COMMERCIAL

## 2021-03-17 DIAGNOSIS — C15.5 MALIGNANT NEOPLASM OF LOWER THIRD OF ESOPHAGUS (HCC): ICD-10-CM

## 2021-03-17 LAB
A/G RATIO: 1 (ref 1.1–2.2)
ALBUMIN SERPL-MCNC: 3.9 G/DL (ref 3.4–5)
ALP BLD-CCNC: 105 U/L (ref 40–129)
ALT SERPL-CCNC: 14 U/L (ref 10–40)
ANION GAP SERPL CALCULATED.3IONS-SCNC: 8 MMOL/L (ref 3–16)
AST SERPL-CCNC: 27 U/L (ref 15–37)
BILIRUB SERPL-MCNC: 0.5 MG/DL (ref 0–1)
BUN BLDV-MCNC: 19 MG/DL (ref 7–20)
CALCIUM SERPL-MCNC: 9.2 MG/DL (ref 8.3–10.6)
CHLORIDE BLD-SCNC: 103 MMOL/L (ref 99–110)
CO2: 25 MMOL/L (ref 21–32)
CREAT SERPL-MCNC: 2.1 MG/DL (ref 0.8–1.3)
GFR AFRICAN AMERICAN: 39
GFR NON-AFRICAN AMERICAN: 32
GLOBULIN: 3.8 G/DL
GLUCOSE BLD-MCNC: 98 MG/DL (ref 70–99)
POTASSIUM SERPL-SCNC: 4 MMOL/L (ref 3.5–5.1)
SODIUM BLD-SCNC: 136 MMOL/L (ref 136–145)
TOTAL PROTEIN: 7.7 G/DL (ref 6.4–8.2)

## 2021-03-17 PROCEDURE — 36415 COLL VENOUS BLD VENIPUNCTURE: CPT

## 2021-03-17 PROCEDURE — 6360000004 HC RX CONTRAST MEDICATION: Performed by: INTERNAL MEDICINE

## 2021-03-17 PROCEDURE — 74176 CT ABD & PELVIS W/O CONTRAST: CPT

## 2021-03-17 PROCEDURE — 80053 COMPREHEN METABOLIC PANEL: CPT

## 2021-03-17 RX ADMIN — IOHEXOL 50 ML: 240 INJECTION, SOLUTION INTRATHECAL; INTRAVASCULAR; INTRAVENOUS; ORAL at 09:00

## 2021-05-18 ENCOUNTER — HOSPITAL ENCOUNTER (OUTPATIENT)
Dept: GENERAL RADIOLOGY | Age: 61
Discharge: HOME | End: 2021-05-18
Payer: COMMERCIAL

## 2021-05-18 ENCOUNTER — HOSPITAL ENCOUNTER (OUTPATIENT)
Age: 61
Discharge: HOME | End: 2021-05-18
Payer: COMMERCIAL

## 2021-05-18 DIAGNOSIS — M54.50 ACUTE LOW BACK PAIN WITHOUT SCIATICA, UNSPECIFIED BACK PAIN LATERALITY: ICD-10-CM

## 2021-05-18 PROCEDURE — 72100 X-RAY EXAM L-S SPINE 2/3 VWS: CPT

## 2021-05-24 ENCOUNTER — HOSPITAL ENCOUNTER (OUTPATIENT)
Dept: GENERAL RADIOLOGY | Age: 61
Discharge: HOME | End: 2021-05-24
Payer: COMMERCIAL

## 2021-05-24 ENCOUNTER — HOSPITAL ENCOUNTER (OUTPATIENT)
Age: 61
Discharge: HOME | End: 2021-05-24
Payer: COMMERCIAL

## 2021-05-24 DIAGNOSIS — R10.9 STOMACH ACHE: ICD-10-CM

## 2021-05-24 PROCEDURE — 74018 RADEX ABDOMEN 1 VIEW: CPT

## 2021-05-25 ENCOUNTER — HOSPITAL ENCOUNTER (OUTPATIENT)
Dept: CT IMAGING | Age: 61
Discharge: HOME | End: 2021-05-25
Payer: COMMERCIAL

## 2021-05-25 DIAGNOSIS — C15.5 MALIGNANT NEOPLASM OF LOWER THIRD OF ESOPHAGUS (HCC): ICD-10-CM

## 2021-05-25 PROCEDURE — 74150 CT ABDOMEN W/O CONTRAST: CPT

## 2021-05-25 PROCEDURE — 71250 CT THORAX DX C-: CPT

## 2021-07-12 PROBLEM — E87.1 HYPONATREMIA: Status: RESOLVED | Noted: 2020-09-23 | Resolved: 2021-07-12

## 2021-07-27 ENCOUNTER — HOSPITAL ENCOUNTER (OUTPATIENT)
Age: 61
Discharge: HOME | End: 2021-07-27
Payer: MEDICARE

## 2021-08-25 ENCOUNTER — HOSPITAL ENCOUNTER (OUTPATIENT)
Age: 61
Discharge: HOME | End: 2021-08-25
Payer: COMMERCIAL

## 2021-08-25 LAB
24HR URINE VOLUME (ML): 1400 ML
CREAT SERPL-MCNC: 1.9 MG/DL (ref 0.8–1.3)
CREATININE 24 HOUR URINE: 2 G/24HR (ref 0.6–2.5)
CREATININE CLEARANCE: 67 ML/MIN (ref 100–140)
Lab: 24 HR
PROTEIN 24 HOUR URINE: 0.28 G/24HR

## 2021-08-25 PROCEDURE — 84166 PROTEIN E-PHORESIS/URINE/CSF: CPT

## 2021-08-25 PROCEDURE — 82575 CREATININE CLEARANCE TEST: CPT

## 2021-08-25 PROCEDURE — 84156 ASSAY OF PROTEIN URINE: CPT

## 2021-08-25 PROCEDURE — 36415 COLL VENOUS BLD VENIPUNCTURE: CPT

## 2021-08-27 LAB — URINE ELECTROPHORESIS INTERP: NORMAL

## 2021-12-06 ENCOUNTER — HOSPITAL ENCOUNTER (OUTPATIENT)
Age: 61
Discharge: HOME | End: 2021-12-06
Payer: COMMERCIAL

## 2021-12-06 DIAGNOSIS — N18.32 STAGE 3B CHRONIC KIDNEY DISEASE (HCC): ICD-10-CM

## 2021-12-06 DIAGNOSIS — D47.2 IGG LAMBDA MONOCLONAL GAMMOPATHY: ICD-10-CM

## 2021-12-06 DIAGNOSIS — Z90.5 H/O RIGHT NEPHRECTOMY: ICD-10-CM

## 2021-12-06 DIAGNOSIS — N18.30 BENIGN HYPERTENSION WITH CHRONIC KIDNEY DISEASE, STAGE III (HCC): ICD-10-CM

## 2021-12-06 DIAGNOSIS — I12.9 BENIGN HYPERTENSION WITH CHRONIC KIDNEY DISEASE, STAGE III (HCC): ICD-10-CM

## 2021-12-06 LAB
ALBUMIN SERPL-MCNC: 4 G/DL (ref 3.4–5)
ANION GAP SERPL CALCULATED.3IONS-SCNC: 16 MMOL/L (ref 3–16)
BUN BLDV-MCNC: 13 MG/DL (ref 7–20)
CALCIUM SERPL-MCNC: 8.8 MG/DL (ref 8.3–10.6)
CHLORIDE BLD-SCNC: 102 MMOL/L (ref 99–110)
CO2: 20 MMOL/L (ref 21–32)
CREAT SERPL-MCNC: 1.9 MG/DL (ref 0.8–1.3)
GFR AFRICAN AMERICAN: 44
GFR NON-AFRICAN AMERICAN: 36
GLUCOSE BLD-MCNC: 101 MG/DL (ref 70–99)
HCT VFR BLD CALC: 40.3 % (ref 40.5–52.5)
HEMOGLOBIN: 13.5 G/DL (ref 13.5–17.5)
MCH RBC QN AUTO: 31.7 PG (ref 26–34)
MCHC RBC AUTO-ENTMCNC: 33.4 G/DL (ref 31–36)
MCV RBC AUTO: 95.2 FL (ref 80–100)
PARATHYROID HORMONE INTACT: 60.5 PG/ML (ref 14–72)
PDW BLD-RTO: 13.7 % (ref 12.4–15.4)
PHOSPHORUS: 2.8 MG/DL (ref 2.5–4.9)
PLATELET # BLD: 168 K/UL (ref 135–450)
PMV BLD AUTO: 8.1 FL (ref 5–10.5)
POTASSIUM SERPL-SCNC: 4.4 MMOL/L (ref 3.5–5.1)
RBC # BLD: 4.24 M/UL (ref 4.2–5.9)
SODIUM BLD-SCNC: 138 MMOL/L (ref 136–145)
VITAMIN D 25-HYDROXY: 71.7 NG/ML
WBC # BLD: 5 K/UL (ref 4–11)

## 2021-12-06 PROCEDURE — 80069 RENAL FUNCTION PANEL: CPT

## 2021-12-06 PROCEDURE — 82306 VITAMIN D 25 HYDROXY: CPT

## 2021-12-06 PROCEDURE — 83970 ASSAY OF PARATHORMONE: CPT

## 2021-12-06 PROCEDURE — 36415 COLL VENOUS BLD VENIPUNCTURE: CPT

## 2021-12-06 PROCEDURE — 85027 COMPLETE CBC AUTOMATED: CPT

## 2021-12-07 ENCOUNTER — HOSPITAL ENCOUNTER (OUTPATIENT)
Age: 61
Discharge: HOME | End: 2021-12-07
Payer: COMMERCIAL

## 2021-12-07 LAB
CREATININE URINE: 60.5 MG/DL (ref 39–259)
PROTEIN PROTEIN: 7 MG/DL

## 2021-12-07 PROCEDURE — 84156 ASSAY OF PROTEIN URINE: CPT

## 2021-12-07 PROCEDURE — 82570 ASSAY OF URINE CREATININE: CPT

## 2022-01-07 ENCOUNTER — HOSPITAL ENCOUNTER (OUTPATIENT)
Dept: GENERAL RADIOLOGY | Age: 62
Discharge: HOME | End: 2022-01-07
Payer: COMMERCIAL

## 2022-01-07 DIAGNOSIS — K21.9 GASTROESOPHAGEAL REFLUX DISEASE, UNSPECIFIED WHETHER ESOPHAGITIS PRESENT: ICD-10-CM

## 2022-01-07 PROCEDURE — 74230 X-RAY XM SWLNG FUNCJ C+: CPT

## 2022-04-05 ENCOUNTER — HOSPITAL ENCOUNTER (OUTPATIENT)
Dept: INTERVENTIONAL RADIOLOGY/VASCULAR | Age: 62
Discharge: HOME | End: 2022-04-05
Payer: COMMERCIAL

## 2022-04-05 DIAGNOSIS — Z95.828 PORT-A-CATH IN PLACE: ICD-10-CM

## 2022-04-05 PROCEDURE — 36598 INJ W/FLUOR EVAL CV DEVICE: CPT

## 2022-04-05 PROCEDURE — 6360000004 HC RX CONTRAST MEDICATION: Performed by: RADIOLOGY

## 2022-04-05 RX ADMIN — IOPAMIDOL 5 ML: 755 INJECTION, SOLUTION INTRAVENOUS at 08:41

## 2022-05-16 NOTE — TELEPHONE ENCOUNTER
----- Message from Tj Camejo MD sent at 9/25/2020  1:42 PM EDT -----  Please arrange an appointment for this patient with me within 1 week of discharge with a follow-up chest x-ray.     Tj Camejo done

## 2022-06-08 ENCOUNTER — HOSPITAL ENCOUNTER (OUTPATIENT)
Age: 62
Discharge: HOME | End: 2022-06-08
Payer: COMMERCIAL

## 2022-06-08 DIAGNOSIS — D47.2 IGG LAMBDA MONOCLONAL GAMMOPATHY: ICD-10-CM

## 2022-06-08 DIAGNOSIS — Z90.5 H/O RIGHT NEPHRECTOMY: ICD-10-CM

## 2022-06-08 DIAGNOSIS — N18.30 BENIGN HYPERTENSION WITH CHRONIC KIDNEY DISEASE, STAGE III (HCC): ICD-10-CM

## 2022-06-08 DIAGNOSIS — N17.9 ACUTE KIDNEY INJURY SUPERIMPOSED ON CHRONIC KIDNEY DISEASE (HCC): ICD-10-CM

## 2022-06-08 DIAGNOSIS — N18.9 ACUTE KIDNEY INJURY SUPERIMPOSED ON CHRONIC KIDNEY DISEASE (HCC): ICD-10-CM

## 2022-06-08 DIAGNOSIS — I12.9 BENIGN HYPERTENSION WITH CHRONIC KIDNEY DISEASE, STAGE III (HCC): ICD-10-CM

## 2022-06-08 DIAGNOSIS — N18.32 STAGE 3B CHRONIC KIDNEY DISEASE (HCC): ICD-10-CM

## 2022-06-08 LAB
ALBUMIN SERPL-MCNC: 3.8 G/DL (ref 3.4–5)
ANION GAP SERPL CALCULATED.3IONS-SCNC: 11 MMOL/L (ref 3–16)
BACTERIA: ABNORMAL /HPF
BILIRUBIN URINE: NEGATIVE
BLOOD, URINE: NEGATIVE
BUN BLDV-MCNC: 14 MG/DL (ref 7–20)
CALCIUM SERPL-MCNC: 9.4 MG/DL (ref 8.3–10.6)
CHLORIDE BLD-SCNC: 107 MMOL/L (ref 99–110)
CLARITY: CLEAR
CO2: 21 MMOL/L (ref 21–32)
COLOR: YELLOW
CREAT SERPL-MCNC: 1.8 MG/DL (ref 0.8–1.3)
CREATININE URINE: 128.6 MG/DL (ref 39–259)
EPITHELIAL CELLS, UA: 0 /HPF (ref 0–5)
GFR AFRICAN AMERICAN: 46
GFR NON-AFRICAN AMERICAN: 38
GLUCOSE BLD-MCNC: 62 MG/DL (ref 70–99)
GLUCOSE URINE: NEGATIVE MG/DL
HCT VFR BLD CALC: 39.1 % (ref 40.5–52.5)
HEMOGLOBIN: 13.3 G/DL (ref 13.5–17.5)
HYALINE CASTS: 1 /LPF (ref 0–8)
KETONES, URINE: NEGATIVE MG/DL
LEUKOCYTE ESTERASE, URINE: NEGATIVE
MCH RBC QN AUTO: 33 PG (ref 26–34)
MCHC RBC AUTO-ENTMCNC: 34 G/DL (ref 31–36)
MCV RBC AUTO: 97.2 FL (ref 80–100)
MICROSCOPIC EXAMINATION: YES
NITRITE, URINE: NEGATIVE
PARATHYROID HORMONE INTACT: 23.3 PG/ML (ref 14–72)
PDW BLD-RTO: 14.4 % (ref 12.4–15.4)
PH UA: 5.5 (ref 5–8)
PHOSPHORUS: 2.9 MG/DL (ref 2.5–4.9)
PLATELET # BLD: 175 K/UL (ref 135–450)
PMV BLD AUTO: 7.9 FL (ref 5–10.5)
POTASSIUM SERPL-SCNC: 4.5 MMOL/L (ref 3.5–5.1)
PROTEIN PROTEIN: 41 MG/DL
PROTEIN UA: 30 MG/DL
PROTEIN/CREAT RATIO: 0.3 MG/DL
RBC # BLD: 4.03 M/UL (ref 4.2–5.9)
RBC UA: 0 /HPF (ref 0–4)
SODIUM BLD-SCNC: 139 MMOL/L (ref 136–145)
SPECIFIC GRAVITY UA: 1.02 (ref 1–1.03)
URINE TYPE: ABNORMAL
UROBILINOGEN, URINE: 0.2 E.U./DL
VITAMIN D 25-HYDROXY: 71.4 NG/ML
WBC # BLD: 4.5 K/UL (ref 4–11)
WBC UA: 0 /HPF (ref 0–5)

## 2022-06-08 PROCEDURE — 84156 ASSAY OF PROTEIN URINE: CPT

## 2022-06-08 PROCEDURE — 81001 URINALYSIS AUTO W/SCOPE: CPT

## 2022-06-08 PROCEDURE — 82570 ASSAY OF URINE CREATININE: CPT

## 2022-06-08 PROCEDURE — 85027 COMPLETE CBC AUTOMATED: CPT

## 2022-06-08 PROCEDURE — 80069 RENAL FUNCTION PANEL: CPT

## 2022-06-08 PROCEDURE — 83970 ASSAY OF PARATHORMONE: CPT

## 2022-06-08 PROCEDURE — 82306 VITAMIN D 25 HYDROXY: CPT

## 2022-08-01 ENCOUNTER — HOSPITAL ENCOUNTER (OUTPATIENT)
Dept: CT IMAGING | Age: 62
Discharge: HOME | End: 2022-08-01
Payer: COMMERCIAL

## 2022-08-01 DIAGNOSIS — C15.5 MALIGNANT NEOPLASM OF ABDOMINAL ESOPHAGUS (HCC): ICD-10-CM

## 2022-08-01 PROCEDURE — 74176 CT ABD & PELVIS W/O CONTRAST: CPT

## 2022-09-02 ENCOUNTER — HOSPITAL ENCOUNTER (OUTPATIENT)
Age: 62
Discharge: HOME | End: 2022-09-02
Payer: COMMERCIAL

## 2022-09-02 DIAGNOSIS — Z90.5 H/O RIGHT NEPHRECTOMY: ICD-10-CM

## 2022-09-02 DIAGNOSIS — N18.32 STAGE 3B CHRONIC KIDNEY DISEASE (HCC): ICD-10-CM

## 2022-09-02 DIAGNOSIS — I95.9 HYPOTENSION, UNSPECIFIED HYPOTENSION TYPE: ICD-10-CM

## 2022-09-02 LAB
ALBUMIN SERPL-MCNC: 3.5 G/DL (ref 3.4–5)
ANION GAP SERPL CALCULATED.3IONS-SCNC: 11 MMOL/L (ref 3–16)
BUN BLDV-MCNC: 13 MG/DL (ref 7–20)
CALCIUM SERPL-MCNC: 9 MG/DL (ref 8.3–10.6)
CHLORIDE BLD-SCNC: 107 MMOL/L (ref 99–110)
CO2: 21 MMOL/L (ref 21–32)
CREAT SERPL-MCNC: 1.7 MG/DL (ref 0.8–1.3)
CREATININE URINE: 193.9 MG/DL (ref 39–259)
GFR AFRICAN AMERICAN: 49
GFR NON-AFRICAN AMERICAN: 41
GLUCOSE BLD-MCNC: 68 MG/DL (ref 70–99)
HCT VFR BLD CALC: 34.6 % (ref 40.5–52.5)
HEMOGLOBIN: 11.5 G/DL (ref 13.5–17.5)
MCH RBC QN AUTO: 32.5 PG (ref 26–34)
MCHC RBC AUTO-ENTMCNC: 33.1 G/DL (ref 31–36)
MCV RBC AUTO: 98.2 FL (ref 80–100)
PARATHYROID HORMONE INTACT: 39.2 PG/ML (ref 14–72)
PDW BLD-RTO: 14.1 % (ref 12.4–15.4)
PHOSPHORUS: 3 MG/DL (ref 2.5–4.9)
PLATELET # BLD: 196 K/UL (ref 135–450)
PMV BLD AUTO: 7.7 FL (ref 5–10.5)
POTASSIUM SERPL-SCNC: 4.5 MMOL/L (ref 3.5–5.1)
PROTEIN PROTEIN: 33 MG/DL
PROTEIN/CREAT RATIO: 0.2 MG/DL
RBC # BLD: 3.53 M/UL (ref 4.2–5.9)
SODIUM BLD-SCNC: 139 MMOL/L (ref 136–145)
WBC # BLD: 5.6 K/UL (ref 4–11)

## 2022-09-02 PROCEDURE — 80069 RENAL FUNCTION PANEL: CPT

## 2022-09-02 PROCEDURE — 84156 ASSAY OF PROTEIN URINE: CPT

## 2022-09-02 PROCEDURE — 36415 COLL VENOUS BLD VENIPUNCTURE: CPT

## 2022-09-02 PROCEDURE — 83970 ASSAY OF PARATHORMONE: CPT

## 2022-09-02 PROCEDURE — 82570 ASSAY OF URINE CREATININE: CPT

## 2022-09-02 PROCEDURE — 85027 COMPLETE CBC AUTOMATED: CPT

## 2022-09-02 PROCEDURE — 82306 VITAMIN D 25 HYDROXY: CPT

## 2022-09-03 LAB — VITAMIN D 25-HYDROXY: 84.4 NG/ML

## 2022-09-21 ENCOUNTER — HOSPITAL ENCOUNTER (OUTPATIENT)
Dept: CT IMAGING | Age: 62
Discharge: HOME | End: 2022-09-21
Payer: COMMERCIAL

## 2022-09-21 DIAGNOSIS — C15.5 MALIGNANT NEOPLASM OF ABDOMINAL ESOPHAGUS (HCC): ICD-10-CM

## 2022-09-21 PROCEDURE — 74176 CT ABD & PELVIS W/O CONTRAST: CPT

## 2022-10-17 ENCOUNTER — HOSPITAL ENCOUNTER (OUTPATIENT)
Age: 62
Discharge: HOME | End: 2022-10-17
Payer: COMMERCIAL

## 2022-10-17 LAB
ANION GAP SERPL CALCULATED.3IONS-SCNC: 10 MMOL/L (ref 3–16)
BUN BLDV-MCNC: 11 MG/DL (ref 7–20)
CALCIUM SERPL-MCNC: 9 MG/DL (ref 8.3–10.6)
CHLORIDE BLD-SCNC: 108 MMOL/L (ref 99–110)
CO2: 22 MMOL/L (ref 21–32)
CREAT SERPL-MCNC: 1.8 MG/DL (ref 0.8–1.3)
GFR SERPL CREATININE-BSD FRML MDRD: 42 ML/MIN/{1.73_M2}
GLUCOSE BLD-MCNC: 44 MG/DL (ref 70–99)
IGA: 160 MG/DL (ref 70–400)
POTASSIUM SERPL-SCNC: 4.4 MMOL/L (ref 3.5–5.1)
SODIUM BLD-SCNC: 140 MMOL/L (ref 136–145)
TSH SERPL DL<=0.05 MIU/L-ACNC: 0.76 UIU/ML (ref 0.27–4.2)

## 2022-10-17 PROCEDURE — 80048 BASIC METABOLIC PNL TOTAL CA: CPT

## 2022-10-17 PROCEDURE — 84443 ASSAY THYROID STIM HORMONE: CPT

## 2022-10-17 PROCEDURE — 36415 COLL VENOUS BLD VENIPUNCTURE: CPT

## 2022-10-17 PROCEDURE — 83516 IMMUNOASSAY NONANTIBODY: CPT

## 2022-10-17 PROCEDURE — 82784 ASSAY IGA/IGD/IGG/IGM EACH: CPT

## 2022-10-18 LAB — TISSUE TRANSGLUTAMINASE IGA: <0.5 U/ML (ref 0–14)

## 2022-10-26 ENCOUNTER — APPOINTMENT (OUTPATIENT)
Dept: CT IMAGING | Age: 62
End: 2022-10-26
Payer: COMMERCIAL

## 2022-10-26 ENCOUNTER — HOSPITAL ENCOUNTER (EMERGENCY)
Age: 62
Discharge: HOME | End: 2022-10-26
Attending: EMERGENCY MEDICINE
Payer: COMMERCIAL

## 2022-10-26 VITALS
OXYGEN SATURATION: 99 % | HEIGHT: 71 IN | HEART RATE: 61 BPM | DIASTOLIC BLOOD PRESSURE: 64 MMHG | BODY MASS INDEX: 16.8 KG/M2 | RESPIRATION RATE: 10 BRPM | TEMPERATURE: 98.5 F | SYSTOLIC BLOOD PRESSURE: 121 MMHG | WEIGHT: 120 LBS

## 2022-10-26 DIAGNOSIS — R19.7 DIARRHEA, UNSPECIFIED TYPE: Primary | ICD-10-CM

## 2022-10-26 LAB
A/G RATIO: 1.3 (ref 1.1–2.2)
ALBUMIN SERPL-MCNC: 4.3 G/DL (ref 3.4–5)
ALP BLD-CCNC: 87 U/L (ref 40–129)
ALT SERPL-CCNC: 35 U/L (ref 10–40)
AMORPHOUS: PRESENT
ANION GAP SERPL CALCULATED.3IONS-SCNC: 10 MMOL/L (ref 3–16)
AST SERPL-CCNC: 62 U/L (ref 15–37)
BACTERIA: ABNORMAL /HPF
BASOPHILS ABSOLUTE: 0 K/UL (ref 0–0.2)
BASOPHILS RELATIVE PERCENT: 0.6 %
BILIRUB SERPL-MCNC: 0.5 MG/DL (ref 0–1)
BILIRUBIN URINE: NEGATIVE
BLOOD, URINE: NEGATIVE
BUN BLDV-MCNC: 25 MG/DL (ref 7–20)
CALCIUM SERPL-MCNC: 9.7 MG/DL (ref 8.3–10.6)
CHLORIDE BLD-SCNC: 97 MMOL/L (ref 99–110)
CLARITY: ABNORMAL
CO2: 28 MMOL/L (ref 21–32)
COLOR: YELLOW
CREAT SERPL-MCNC: 1.7 MG/DL (ref 0.8–1.3)
EOSINOPHILS ABSOLUTE: 0 K/UL (ref 0–0.6)
EOSINOPHILS RELATIVE PERCENT: 0.3 %
EPITHELIAL CELLS, UA: 1 /HPF (ref 0–5)
GFR SERPL CREATININE-BSD FRML MDRD: 45 ML/MIN/{1.73_M2}
GLUCOSE BLD-MCNC: 88 MG/DL (ref 70–99)
GLUCOSE URINE: NEGATIVE MG/DL
HCT VFR BLD CALC: 45.8 % (ref 40.5–52.5)
HEMOGLOBIN: 15.1 G/DL (ref 13.5–17.5)
HYALINE CASTS: 1 /LPF (ref 0–8)
KETONES, URINE: 15 MG/DL
LACTIC ACID: 1.5 MMOL/L (ref 0.4–2)
LEUKOCYTE ESTERASE, URINE: NEGATIVE
LIPASE: 66 U/L (ref 13–60)
LYMPHOCYTES ABSOLUTE: 1.5 K/UL (ref 1–5.1)
LYMPHOCYTES RELATIVE PERCENT: 20.2 %
MCH RBC QN AUTO: 32.1 PG (ref 26–34)
MCHC RBC AUTO-ENTMCNC: 33 G/DL (ref 31–36)
MCV RBC AUTO: 97.4 FL (ref 80–100)
MICROSCOPIC EXAMINATION: YES
MONOCYTES ABSOLUTE: 0.9 K/UL (ref 0–1.3)
MONOCYTES RELATIVE PERCENT: 12.7 %
NEUTROPHILS ABSOLUTE: 4.8 K/UL (ref 1.7–7.7)
NEUTROPHILS RELATIVE PERCENT: 66.2 %
NITRITE, URINE: NEGATIVE
PDW BLD-RTO: 14.6 % (ref 12.4–15.4)
PH UA: 5.5 (ref 5–8)
PLATELET # BLD: 202 K/UL (ref 135–450)
PMV BLD AUTO: 7.6 FL (ref 5–10.5)
POTASSIUM REFLEX MAGNESIUM: 3.7 MMOL/L (ref 3.5–5.1)
PROTEIN UA: 30 MG/DL
RBC # BLD: 4.7 M/UL (ref 4.2–5.9)
RBC UA: 2 /HPF (ref 0–4)
SODIUM BLD-SCNC: 135 MMOL/L (ref 136–145)
SPECIFIC GRAVITY UA: 1.02 (ref 1–1.03)
TOTAL PROTEIN: 7.7 G/DL (ref 6.4–8.2)
TROPONIN: 0.02 NG/ML
TROPONIN: <0.01 NG/ML
URINE REFLEX TO CULTURE: ABNORMAL
URINE TYPE: ABNORMAL
UROBILINOGEN, URINE: 1 E.U./DL
WBC # BLD: 7.3 K/UL (ref 4–11)
WBC UA: 0 /HPF (ref 0–5)

## 2022-10-26 PROCEDURE — 74176 CT ABD & PELVIS W/O CONTRAST: CPT

## 2022-10-26 PROCEDURE — 84484 ASSAY OF TROPONIN QUANT: CPT

## 2022-10-26 PROCEDURE — 80053 COMPREHEN METABOLIC PANEL: CPT

## 2022-10-26 PROCEDURE — 36415 COLL VENOUS BLD VENIPUNCTURE: CPT

## 2022-10-26 PROCEDURE — 2580000003 HC RX 258: Performed by: EMERGENCY MEDICINE

## 2022-10-26 PROCEDURE — 93005 ELECTROCARDIOGRAM TRACING: CPT | Performed by: EMERGENCY MEDICINE

## 2022-10-26 PROCEDURE — 83605 ASSAY OF LACTIC ACID: CPT

## 2022-10-26 PROCEDURE — 6370000000 HC RX 637 (ALT 250 FOR IP): Performed by: EMERGENCY MEDICINE

## 2022-10-26 PROCEDURE — 81001 URINALYSIS AUTO W/SCOPE: CPT

## 2022-10-26 PROCEDURE — 99284 EMERGENCY DEPT VISIT MOD MDM: CPT

## 2022-10-26 PROCEDURE — 85025 COMPLETE CBC W/AUTO DIFF WBC: CPT

## 2022-10-26 PROCEDURE — 83690 ASSAY OF LIPASE: CPT

## 2022-10-26 RX ORDER — LOPERAMIDE HYDROCHLORIDE 2 MG/1
2 CAPSULE ORAL 4 TIMES DAILY PRN
Qty: 30 CAPSULE | Refills: 0 | Status: SHIPPED | OUTPATIENT
Start: 2022-10-26 | End: 2022-10-26 | Stop reason: SDUPTHER

## 2022-10-26 RX ORDER — ONDANSETRON 4 MG/1
4 TABLET, ORALLY DISINTEGRATING ORAL EVERY 8 HOURS PRN
Qty: 20 TABLET | Refills: 0 | Status: SHIPPED | OUTPATIENT
Start: 2022-10-26

## 2022-10-26 RX ORDER — LOPERAMIDE HYDROCHLORIDE 2 MG/1
4 CAPSULE ORAL ONCE
Status: COMPLETED | OUTPATIENT
Start: 2022-10-26 | End: 2022-10-26

## 2022-10-26 RX ORDER — 0.9 % SODIUM CHLORIDE 0.9 %
1000 INTRAVENOUS SOLUTION INTRAVENOUS ONCE
Status: COMPLETED | OUTPATIENT
Start: 2022-10-26 | End: 2022-10-26

## 2022-10-26 RX ORDER — ONDANSETRON 4 MG/1
4 TABLET, ORALLY DISINTEGRATING ORAL EVERY 8 HOURS PRN
Qty: 20 TABLET | Refills: 0 | Status: SHIPPED | OUTPATIENT
Start: 2022-10-26 | End: 2022-10-26 | Stop reason: SDUPTHER

## 2022-10-26 RX ORDER — LOPERAMIDE HYDROCHLORIDE 2 MG/1
2 CAPSULE ORAL 4 TIMES DAILY PRN
Qty: 30 CAPSULE | Refills: 0 | Status: SHIPPED | OUTPATIENT
Start: 2022-10-26 | End: 2022-11-05

## 2022-10-26 RX ADMIN — SODIUM CHLORIDE 1000 ML: 9 INJECTION, SOLUTION INTRAVENOUS at 16:06

## 2022-10-26 RX ADMIN — LOPERAMIDE HYDROCHLORIDE 4 MG: 2 CAPSULE ORAL at 16:41

## 2022-10-26 ASSESSMENT — ENCOUNTER SYMPTOMS
WHEEZING: 0
ABDOMINAL PAIN: 0
COLOR CHANGE: 0
ABDOMINAL DISTENTION: 0
PHOTOPHOBIA: 0
VOMITING: 0
BACK PAIN: 0
BLOOD IN STOOL: 0
SHORTNESS OF BREATH: 0
RHINORRHEA: 0
DIARRHEA: 1
NAUSEA: 1

## 2022-10-26 ASSESSMENT — PAIN - FUNCTIONAL ASSESSMENT
PAIN_FUNCTIONAL_ASSESSMENT: NONE - DENIES PAIN
PAIN_FUNCTIONAL_ASSESSMENT: NONE - DENIES PAIN

## 2022-10-26 NOTE — ED PROVIDER NOTES
[facility]      Pt Name: Kiet Celis  MRN: [id number]  [place] 1960  Date ofevaluation: 10/26/2022  Provider: Clifford Mccord MD    CHIEF COMPLAINT       Chief Complaint   Patient presents with    Diarrhea     Pt states caught covid in may, feeling dehydrated since, pt states negative for covid this weekend, diarrhea x3 days, on meds not helping, feels very fatigued, dizzy, hx of esophageal CA in remission         HISTORY OF PRESENT ILLNESS   (Location/Symptom, Timing/Onset,Context/Setting, Quality, Duration, Modifying Factors, Severity)  Note limiting factors. Kiet Celis is a 60 y.o. male  who  has a past medical history of Esophageal cancer ([address] Hyperlipidemia, and Prostate enlargement. who presents to the emergency department for evaluation of diarrhea and generalized fatigue. Patient with 3-day history of having loose stools and lack of appetite. Reports that his wife was ill with similar symptoms. He states that is not able to tolerate p.o. very well. Reports frequent previous stools but denies hematochezia or melena denies chest pain shortness of breath abdominal pain. Patient states he is concerned about possible dehydration. Reports he had negative COVID test at home. Denies a history of previous abdominal surgeries. [facility]    NursingNotes were reviewed. REVIEW OF SYSTEMS    (2-9 systems for level 4, 10 or more for level 5)     Review of Systems   Constitutional:  Positive for appetite change and fatigue. Negative for activity change, chills and fever. HENT:  Negative for congestion and rhinorrhea. Eyes:  Negative for photophobia and visual disturbance. Respiratory:  Negative for shortness of breath and wheezing. Cardiovascular:  Negative for chest pain, palpitations and leg swelling. Gastrointestinal:  Positive for diarrhea and nausea.  Negative for abdominal distention, abdominal pain, blood in stool and vomiting. Endocrine: Negative for polydipsia and polyuria. Genitourinary:  Negative for difficulty urinating and frequency. Musculoskeletal:  Negative for back pain and gait problem. Skin:  Negative for color change and rash. Neurological:  Negative for light-headedness and headaches. Psychiatric/Behavioral:  Negative for confusion. The patient is not nervous/anxious. All other systems reviewed and are negative. Except as noted above the remainder of the review of systems was reviewed and negative. PAST MEDICAL HISTORY     Past Medical History:   Diagnosis Date    Esophageal cancer ([address]     poss kidney    Hyperlipidemia     Prostate enlargement          SURGICALHISTORY       Past Surgical History:   Procedure Laterality Date    COLONOSCOPY      ESOPHAGUS SURGERY      for cancer    KIDNEY REMOVAL Right 9/21/2020    ROBOTIC LAPAROSCOPIC RIGHT NEPHRECTOMY performed by Nba Louie MD at [address] Right 02/08/2018    inserted in radiology by Dr Suzanne Gramajo as outpt \"power port\"    UPPER GASTROINTESTINAL ENDOSCOPY  2017    UPPER GASTROINTESTINAL ENDOSCOPY  05/17/2017    EUS    UPPER GASTROINTESTINAL ENDOSCOPY  04/06/2018         CURRENT MEDICATIONS       Previous Medications    ATORVASTATIN (LIPITOR) 20 MG TABLET    20 mg nightly     FERROUS SULFATE (IRON 325) 325 (65 FE) MG TABLET    Take 325 mg by mouth daily (with breakfast)    MEDICAL MARIJUANA    Take by mouth as needed. MISC NATURAL PRODUCTS (PROSTATE SUPPORT PO)    Take by mouth Indications: super beta prostate    MULTIPLE VITAMINS-MINERALS (CENTRUM SILVER PO)    Take by mouth    OMEPRAZOLE (PRILOSEC) 20 MG DELAYED RELEASE CAPSULE    Take 20 mg by mouth daily    SILDENAFIL (VIAGRA) 100 MG TABLET    Take by mouth every morning     TRASTUZUMAB (HERCEPTIN) 440 MG CHEMO INJECTION    Infuse 2 mg/kg intravenously once Every three weeks            Patient has no known allergies.     FAMILY HISTORY     History reviewed. No pertinent family history. SOCIAL HISTORY       Social History     Socioeconomic History    Marital status:      Spouse name: None    Number of children: None    Years of education: None    Highest education level: None   Tobacco Use    Smoking status: Former     Packs/day: 0.50     Years: 10.00     Pack years: 5.00     Types: Cigarettes    Smokeless tobacco: Never   Vaping Use    Vaping Use: Never used   Substance and Sexual Activity    Alcohol use: Yes     Comment: occasionally     Drug use: Never       SCREENINGS    Uri Coma Scale  Eye Opening: Spontaneous  Best Verbal Response: Oriented  Best Motor Response: Obeys commands  Bath Coma Scale Score: 15        PHYSICAL EXAM    (up to 7 for level 4, 8 or more for level 5)     ED Triage Vitals [10/26/22 1513]   BP Temp Temp Source Heart Rate Resp SpO2 Height Weight   (!) 82/41 98.5 °F (36.9 °C) Oral 88 18 99 % 5' 11\" (1.803 m) 120 lb (54.4 kg)       Physical Exam  Vitals reviewed. Constitutional:       General: He is not in acute distress. Appearance: He is well-developed. HENT:      Head: Normocephalic and atraumatic. Eyes:      Conjunctiva/sclera: Conjunctivae normal.   Neck:      Trachea: No tracheal deviation. Cardiovascular:      Rate and Rhythm: Normal rate and regular rhythm. Pulmonary:      Effort: Pulmonary effort is normal.      Breath sounds: Normal breath sounds. No wheezing or rales. Abdominal:      General: There is no distension. Palpations: Abdomen is soft. Tenderness: There is no abdominal tenderness. There is no guarding or rebound. Musculoskeletal:         General: No deformity. Normal range of motion. Cervical back: Normal range of motion. Skin:     General: Skin is warm and dry. Capillary Refill: Capillary refill takes less than 2 seconds. Neurological:      Mental Status: He is alert and oriented to person, place, and time.        RESULTS     EKG: All EKG's are interpreted by the Emergency Department Physician who either signs or Co-signsthis chart in the absence of a cardiologist.    EKG demonstrates sinus rhythm ventricular rate of 80 bpm.  [place] interval and QTc interval within normal limits. Patient has normal axis. There are no significant ST elevations or depressions EKG is nondiagnostic for ACS. No previous EKG to compare to. RADIOLOGY:   Non-plain filmimages such as CT, Ultrasound and MRI are read by the radiologist. Plain radiographic images are visualized and preliminarily interpreted by the emergency physician with the below findings:        Interpretation per the Radiologist below, if available at the time ofthis note:    No orders to display         ED BEDSIDE ULTRASOUND:   Performed by ED Physician - none    LABS:  Labs Reviewed   COMPREHENSIVE METABOLIC PANEL W/ REFLEX TO MG FOR LOW K - Abnormal; Notable for the following components:       Result Value    Sodium 135 (*)     Chloride 97 (*)     BUN 25 (*)     Creatinine 1.7 (*)     Est, Glom Filt Rate 45 (*)     AST 62 (*)     All other components within normal limits   LIPASE - Abnormal; Notable for the following components:    Lipase 66.0 (*)     All other components within normal limits   TROPONIN - Abnormal; Notable for the following components:    Troponin 0.02 (*)     All other components within normal limits   CBC WITH AUTO DIFFERENTIAL   LACTIC ACID   URINALYSIS WITH REFLEX TO CULTURE       All other labs were within normal range or not returned as of this dictation.     EMERGENCY DEPARTMENT COURSE and DIFFERENTIAL DIAGNOSIS/MDM:   Vitals:    Vitals:    10/26/22 1513 10/26/22 1600   BP: (!) 82/41 94/66   Pulse: 88 68   Resp: 18 15   Temp: 98.5 °F (36.9 °C)    TempSrc: Oral    SpO2: 99% 100%   Weight: 120 lb (54.4 kg)    Height: 5' 11\" (1.803 m)        Patient was given thefollowing medications:  Medications   0.9 % sodium chloride bolus (1,000 mLs IntraVENous New Bag 10/26/22 1606)       ED COURSE & MEDICAL DECISION MAKING    Pertinent Labs & Imaging studies reviewed. (See chart for details)   -  Patient seen and evaluated in the emergency department. -  Triage and nursing notes reviewed and incorporated. -  Old chart records reviewed and incorporated. -  Differential diagnosis includes: Differential diagnosis: Abdominal Aortic Aneurysm, Acute Coronary Syndrome, Ischemic Bowel, Bowel Obstruction (including Gastric Outlet Obstruction), PUD, GERD, Acute Cholecystitis, Pancreatitis, Hepatitis, Colitis, SMA Syndrome, Mesenteric Steal Syndrome, Splanchnic Vein Thrombosis, Appendicitis, Diverticulitis, Pyelonephritis, UTI, STD, Gonad Torsion, other     -  Work-up included:  See above  -  ED treatment included: See above  -  Results discussed with patient. Patient resents ED for valuation of loose stools generalized fatigue. He reports that his wife had similar symptoms. Currently denies chest pain shortness of breath abdominal pain. Abdominal exam is benign. Patient borderline hypotensive, reviewing previous vital signs typically have blood pressures in the 22Q systolic.      labs show renal function appears to be at baseline. Patient's troponin slightly elevated. EKG nondiagnostic for ACS. Imaging studies show no emergent laboratory maladies. Patient's blood pressure did improve but the patient is borderline hypotensive at baseline and appears to be at baseline. He has not had any episodes of diarrhea while in the emergency department. Renal function appears to be at baseline. Patient minimal discharge home with outpatient follow-up with specialists. Reports the diarrhea is chronic but recently worsened. But denies any hospitalizations or antibiotic use have low suspicion for C. difficile infection. .  Patient feels improved on reevaluation.   Symptomatic treatment with expectant management discussed with the patient and they and/or family members present are amenable to treatment plan and outpatient follow-up. Strict return precautions were discussed with the patient and those present. They demonstrated understanding of when to return to the emergency department for new or worsening symptoms. .  The patient is agreeable with plan of care and disposition. Is this patient to be included in the SEP-1 Core Measure due to severe sepsis or septic shock? No   Exclusion criteria - the patient is NOT to be included for SEP-1 Core Measure due to:  2+ SIRS criteria are not met      REASSESSMENT          CRITICAL CARE TIME   Total Critical Care time was 10 minutes, excluding separately reportable procedures. There was a high probability of clinically significant/life threatening deterioration in the patient's condition which required my urgent intervention. CONSULTS:  None    PROCEDURES:  Unless otherwise noted below, none     Procedures    FINAL IMPRESSION      1. Diarrhea, unspecified type          DISPOSITION/PLAN   DISPOSITION        PATIENT REFERREDTO:  No follow-up provider specified.     DISCHARGEMEDICATIONS:  New Prescriptions    No medications on file          (Please note that portions of this note were completed with a voice recognition program.  Efforts were made to edit the dictations but occasionally words are mis-transcribed.)    Thong Villeda MD (electronically signed)  Attending Emergency Physician          Thong Villeda MD  10/28/22 3912

## 2022-10-28 LAB
EKG ATRIAL RATE: 80 BPM
EKG DIAGNOSIS: NORMAL
EKG P AXIS: 83 DEGREES
EKG P-R INTERVAL: 126 MS
EKG Q-T INTERVAL: 384 MS
EKG QRS DURATION: 76 MS
EKG QTC CALCULATION (BAZETT): 442 MS
EKG R AXIS: 70 DEGREES
EKG T AXIS: 76 DEGREES
EKG VENTRICULAR RATE: 80 BPM

## 2022-10-28 PROCEDURE — 93010 ELECTROCARDIOGRAM REPORT: CPT | Performed by: INTERNAL MEDICINE

## 2022-11-07 NOTE — PROGRESS NOTES
Patient reached __X__ yes  _____ no   VM instructions left ____ yes   phone number ________                                ____ no-office notified          Date __11/9/22_______  Time ___0730____  Arrival _0600  hosp-endo_____    Nothing to eat or drink after midnight-follow your doctors prep instructions-this may include taking a second dose of your prep after midnight  Responsible adult 25 or older to stay on site while you are here-drive you home-stay with you after  Follow any instructions your doctors office has given you  Bring a complete list of all your medications and supplements including name,dose,how often taken the day of your procedure  If you normally take the following medications in the morning please do so the AM of your procedure with a small sip of water       Heart,blood pressure,seizure,thyroid or breathing medications-use your inhalers-bring any rescue inhalers with you DOS       DO NOT take blood pressure medications ending in \"jose\" or \"pril\" the AM of procedure or evening prior  Take half or your normal dose of any long acting insulins the night before your procedure-do not take any diabetic medications the AM of procedure  Follow your doctors instructions regarding stopping or taking  any blood thinners-if you do not have instructions-call them  Any questions call your doctor  Other ______________________________________________________________      [facility] POLICY(subject to change)             The current policy is 2 visitors per patient. There are no children allowed. Mask at discretion of facility. Visiting hours are 8a-8p. Overnight visitors will be at the discretion of the nurse. All policies are subject to change.

## 2022-11-09 ENCOUNTER — ANESTHESIA (OUTPATIENT)
Dept: ENDOSCOPY | Age: 62
End: 2022-11-09
Payer: COMMERCIAL

## 2022-11-09 ENCOUNTER — HOSPITAL ENCOUNTER (OUTPATIENT)
Age: 62
Setting detail: OUTPATIENT SURGERY
Discharge: HOME | End: 2022-11-09
Attending: INTERNAL MEDICINE | Admitting: INTERNAL MEDICINE
Payer: COMMERCIAL

## 2022-11-09 ENCOUNTER — ANESTHESIA EVENT (OUTPATIENT)
Dept: ENDOSCOPY | Age: 62
End: 2022-11-09
Payer: COMMERCIAL

## 2022-11-09 VITALS
TEMPERATURE: 97 F | SYSTOLIC BLOOD PRESSURE: 126 MMHG | HEART RATE: 64 BPM | RESPIRATION RATE: 17 BRPM | DIASTOLIC BLOOD PRESSURE: 92 MMHG | OXYGEN SATURATION: 100 % | WEIGHT: 121 LBS | HEIGHT: 71 IN | BODY MASS INDEX: 16.94 KG/M2

## 2022-11-09 DIAGNOSIS — R19.7 DIARRHEA, UNSPECIFIED TYPE: ICD-10-CM

## 2022-11-09 PROCEDURE — 6360000002 HC RX W HCPCS: Performed by: NURSE ANESTHETIST, CERTIFIED REGISTERED

## 2022-11-09 PROCEDURE — 7100000010 HC PHASE II RECOVERY - FIRST 15 MIN: Performed by: INTERNAL MEDICINE

## 2022-11-09 PROCEDURE — 7100000011 HC PHASE II RECOVERY - ADDTL 15 MIN: Performed by: INTERNAL MEDICINE

## 2022-11-09 PROCEDURE — 2500000003 HC RX 250 WO HCPCS: Performed by: NURSE ANESTHETIST, CERTIFIED REGISTERED

## 2022-11-09 PROCEDURE — 7100000001 HC PACU RECOVERY - ADDTL 15 MIN: Performed by: INTERNAL MEDICINE

## 2022-11-09 PROCEDURE — 88305 TISSUE EXAM BY PATHOLOGIST: CPT

## 2022-11-09 PROCEDURE — 2709999900 HC NON-CHARGEABLE SUPPLY: Performed by: INTERNAL MEDICINE

## 2022-11-09 PROCEDURE — 7100000000 HC PACU RECOVERY - FIRST 15 MIN: Performed by: INTERNAL MEDICINE

## 2022-11-09 PROCEDURE — 3700000000 HC ANESTHESIA ATTENDED CARE: Performed by: INTERNAL MEDICINE

## 2022-11-09 PROCEDURE — 3700000001 HC ADD 15 MINUTES (ANESTHESIA): Performed by: INTERNAL MEDICINE

## 2022-11-09 PROCEDURE — 2580000003 HC RX 258: Performed by: INTERNAL MEDICINE

## 2022-11-09 PROCEDURE — 3609010300 HC COLONOSCOPY W/BIOPSY SINGLE/MULTIPLE: Performed by: INTERNAL MEDICINE

## 2022-11-09 RX ORDER — LIDOCAINE HYDROCHLORIDE 20 MG/ML
INJECTION, SOLUTION EPIDURAL; INFILTRATION; INTRACAUDAL; PERINEURAL PRN
Status: DISCONTINUED | OUTPATIENT
Start: 2022-11-09 | End: 2022-11-09 | Stop reason: SDUPTHER

## 2022-11-09 RX ORDER — SODIUM CHLORIDE 9 MG/ML
INJECTION, SOLUTION INTRAVENOUS CONTINUOUS
Status: DISCONTINUED | OUTPATIENT
Start: 2022-11-09 | End: 2022-11-09 | Stop reason: HOSPADM

## 2022-11-09 RX ORDER — PROPOFOL 10 MG/ML
INJECTION, EMULSION INTRAVENOUS PRN
Status: DISCONTINUED | OUTPATIENT
Start: 2022-11-09 | End: 2022-11-09 | Stop reason: SDUPTHER

## 2022-11-09 RX ADMIN — PROPOFOL 50 MG: 10 INJECTION, EMULSION INTRAVENOUS at 07:35

## 2022-11-09 RX ADMIN — PROPOFOL 50 MG: 10 INJECTION, EMULSION INTRAVENOUS at 07:44

## 2022-11-09 RX ADMIN — SODIUM CHLORIDE: 9 INJECTION, SOLUTION INTRAVENOUS at 06:38

## 2022-11-09 RX ADMIN — PROPOFOL 50 MG: 10 INJECTION, EMULSION INTRAVENOUS at 07:33

## 2022-11-09 RX ADMIN — LIDOCAINE HYDROCHLORIDE 60 MG: 20 INJECTION, SOLUTION EPIDURAL; INFILTRATION; INTRACAUDAL; PERINEURAL at 07:33

## 2022-11-09 RX ADMIN — PROPOFOL 50 MG: 10 INJECTION, EMULSION INTRAVENOUS at 07:40

## 2022-11-09 ASSESSMENT — PAIN SCALES - GENERAL: PAINLEVEL_OUTOF10: 0

## 2022-11-09 NOTE — PROGRESS NOTES
Pt arrived from endo to PACU bay 4. Report received from endo staff. Pt asleep, minimally arousable to voice. Pt arrives with nasal cannula in place, infusing 3L oxygen, vitals as charted.

## 2022-11-09 NOTE — ANESTHESIA POSTPROCEDURE EVALUATION
Department of Anesthesiology  Postprocedure Note    Patient: Fazal Doran  MRN: [id number]  YOB: 1960  Date of evaluation: 11/9/2022      Procedure Summary     Date: 11/09/22 Room / Location: [address] [place] [address]    Anesthesia Start: 0730 Anesthesia Stop: 9012    Procedure: COLONOSCOPY WITH BIOPSY Diagnosis:       Diarrhea, unspecified type      (Diarrhea, unspecified type [R19.7])    Surgeons: Vick Barrett MD Responsible Provider: Virgilio Brown MD    Anesthesia Type: MAC ASA Status: 3          Anesthesia Type: No value filed.     Amado Phase I: Amado Score: 10    Amado Phase II:        Anesthesia Post Evaluation    Patient location during evaluation: PACU  Patient participation: complete - patient participated  Level of consciousness: awake  Airway patency: patent  Nausea & Vomiting: no vomiting and no nausea  Complications: no  Cardiovascular status: hemodynamically stable  Respiratory status: acceptable  Hydration status: stable  Multimodal analgesia pain management approach

## 2022-11-09 NOTE — H&P
Gastroenterology Note                 Pre-operative History and Physical    Patient: Kiet Celis  : 1960  CSN:     History Obtained From:   Patient or guardian. HISTORY OF PRESENT ILLNESS:    The patient is a 60 y.o. male here for Endoscopy. Past Medical History:    Past Medical History:   Diagnosis Date    Anesthesia     hypertension with anesthesia    Esophageal cancer ([address]     poss kidney    Hyperlipidemia     Prostate enlargement      Past Surgical History:    Past Surgical History:   Procedure Laterality Date    COLONOSCOPY      ESOPHAGUS SURGERY      for cancer    KIDNEY REMOVAL Right 2020    ROBOTIC LAPAROSCOPIC RIGHT NEPHRECTOMY performed by Ashlie Baker MD at [address] Right 2018    inserted in radiology by Dr Veronica Espitia as outpt \"power port\"    UPPER GASTROINTESTINAL ENDOSCOPY  2017    UPPER GASTROINTESTINAL ENDOSCOPY  2017    EUS    UPPER GASTROINTESTINAL ENDOSCOPY  2018     Medications Prior to Admission:   No current facility-administered medications on file prior to encounter. Current Outpatient Medications on File Prior to Encounter   Medication Sig Dispense Refill    calcium citrate-vitamin D (CITRICAL + D) 315-250 MG-UNIT TABS per tablet Take 1 tablet by mouth 2 times daily (with meals)      Probiotic Product (PROBIOTIC-10 PO) Take by mouth      ondansetron (ZOFRAN ODT) 4 MG disintegrating tablet Take 1 tablet by mouth every 8 hours as needed for Nausea (Patient not taking: Reported on 2022) 20 tablet 0    medical marijuana Take by mouth as needed.       sildenafil (VIAGRA) 100 MG tablet Take by mouth every morning  (Patient not taking: Reported on 2022)      atorvastatin (LIPITOR) 20 MG tablet 20 mg nightly       Multiple Vitamins-Minerals (CENTRUM SILVER PO) Take by mouth (Patient not taking: Reported on 2022)      Misc Natural Products (PROSTATE SUPPORT PO) Take by mouth Indications: super beta prostate          Allergies:  Patient has no known allergies. Social History:   Social History     Tobacco Use    Smoking status: Former     Packs/day: 0.50     Years: 10.00     Pack years: 5.00     Types: Cigarettes    Smokeless tobacco: Never   Substance Use Topics    Alcohol use: Yes     Comment: occasionally      Family History:   History reviewed. No pertinent family history. PHYSICAL EXAM:      /67   Pulse 60   Temp 96.8 °F (36 °C) (Temporal)   Resp 11   Ht 5' 11\" (1.803 m)   Wt 121 lb (54.9 kg)   SpO2 100%   BMI 16.88 kg/m²  I        Heart:  RRR, normal s1s2    Lungs:  CTA and normal effort    Abdomen:   Soft, nt nd. ASSESSMENT AND PLAN:    1. Patient is a 60 y.o. male here for endoscopy with MAC sedation. 2.  Procedure options, risks and benefits reviewed with patient and/or guardian. They express understanding.

## 2022-11-09 NOTE — PROGRESS NOTES
Teaching / education initiated regarding perioperative experience, expectations, and pain management during stay. Patient verbalized understanding.   Electronically signed by Penny Mayes RN on 11/9/2022 at 6:31 AM

## 2022-11-09 NOTE — ANESTHESIA PRE PROCEDURE
Department of Anesthesiology  Preprocedure Note       Name:  Ahsan Unger   Age:  60 y.o.  :  1960                                          MRN:  [id number]         Date:  2022      Surgeon: Radha Erwin):  Vamsi Casey MD    Procedure: Procedure(s):  COLONOSCOPY DIAGNOSTIC    Medications prior to admission:   Prior to Admission medications    Medication Sig Start Date End Date Taking? Authorizing Provider   calcium citrate-vitamin D (CITRICAL + D) 315-250 MG-UNIT TABS per tablet Take 1 tablet by mouth 2 times daily (with meals)    Historical Provider, MD   Probiotic Product (PROBIOTIC-10 PO) Take by mouth    Historical Provider, MD   ondansetron (ZOFRAN ODT) 4 MG disintegrating tablet Take 1 tablet by mouth every 8 hours as needed for Nausea  Patient not taking: Reported on 2022 10/26/22   Bria Barajas MD   medical marijuana Take by mouth as needed. Historical Provider, MD   sildenafil (VIAGRA) 100 MG tablet Take by mouth every morning   Patient not taking: Reported on 2022    Historical Provider, MD   atorvastatin (LIPITOR) 20 MG tablet 20 mg nightly  17   Historical Provider, MD   Multiple Vitamins-Minerals (CENTRUM SILVER PO) Take by mouth  Patient not taking: Reported on 2022    Historical Provider, MD   Misc Natural Products (PROSTATE SUPPORT PO) Take by mouth Indications: super beta prostate    Historical Provider, MD       Current medications:    No current facility-administered medications for this visit. No current outpatient medications on file.      Facility-Administered Medications Ordered in Other Visits   Medication Dose Route Frequency Provider Last Rate Last Admin    0.9 % sodium chloride infusion   IntraVENous Continuous Vamsi Casey  mL/hr at 22 0638 New Bag at 22 [phone]       Allergies:  No Known Allergies    Problem List:    Patient Active Problem List   Diagnosis Code    Malignant neoplasm of lower third of esophagus ([facility] [address] C15.5  CINV (chemotherapy-induced nausea and vomiting) R11.2, T45.1X5A    Hematemesis K92.0    Renal mass N28.89    CKD (chronic kidney disease) stage 3, GFR 30-59 ml/min (HCC) N18.30    Hypertension I10    Acute kidney injury superimposed on CKD (HCC) N17.9, N18.9    Hydropneumothorax J94.8       Past Medical History:        Diagnosis Date    Anesthesia     hypertension with anesthesia    Esophageal cancer ([address]     poss kidney    Hyperlipidemia     Prostate enlargement        Past Surgical History:        Procedure Laterality Date    COLONOSCOPY      ESOPHAGUS SURGERY      for cancer    KIDNEY REMOVAL Right 9/21/2020    ROBOTIC LAPAROSCOPIC RIGHT NEPHRECTOMY performed by Jessica Oro MD at [place] [address] Right 02/08/2018    inserted in radiology by Dr Nessa Ramon as outpt \"power port\"    UPPER GASTROINTESTINAL ENDOSCOPY  2017    UPPER GASTROINTESTINAL ENDOSCOPY  05/17/2017    EUS    UPPER GASTROINTESTINAL ENDOSCOPY  04/06/2018       Social History:    Social History     Tobacco Use    Smoking status: Former     Packs/day: 0.50     Years: 10.00     Pack years: 5.00     Types: Cigarettes    Smokeless tobacco: Never   Substance Use Topics    Alcohol use: Yes     Comment: occasionally                                 Counseling given: Not Answered      Vital Signs (Current): There were no vitals filed for this visit.                                            BP Readings from Last 3 Encounters:   11/09/22 138/67   10/26/22 121/64   09/09/22 100/60       NPO Status:                                                                                 BMI:   Wt Readings from Last 3 Encounters:   11/07/22 121 lb (54.9 kg)   10/26/22 120 lb (54.4 kg)   09/09/22 128 lb 11.2 oz (58.4 kg)     There is no height or weight on file to calculate BMI.    CBC:   Lab Results   Component Value Date/Time    WBC 7.3 10/26/2022 03:18 PM    RBC 4.70 10/26/2022 03:18 PM    RBC 4.56 07/20/2017 08:54 AM HGB 15.1 10/26/2022 03:18 PM    HCT 45.8 10/26/2022 03:18 PM    MCV 97.4 10/26/2022 03:18 PM    RDW 14.6 10/26/2022 03:18 PM     10/26/2022 03:18 PM       CMP:   Lab Results   Component Value Date/Time     10/26/2022 03:18 PM    K 3.7 10/26/2022 03:18 PM    CL 97 10/26/2022 03:18 PM    CO2 28 10/26/2022 03:18 PM    BUN 25 10/26/2022 03:18 PM    CREATININE 1.7 10/26/2022 03:18 PM    GFRAA 49 09/02/2022 04:30 PM    GFRAA >60 01/22/2013 09:06 AM    AGRATIO 1.3 10/26/2022 03:18 PM    LABGLOM 45 10/26/2022 03:18 PM    GLUCOSE 88 10/26/2022 03:18 PM    GLUCOSE 135 07/20/2017 08:54 AM    PROT 7.7 10/26/2022 03:18 PM    PROT 7.4 07/20/2017 08:54 AM    CALCIUM 9.7 10/26/2022 03:18 PM    BILITOT 0.5 10/26/2022 03:18 PM    ALKPHOS 87 10/26/2022 03:18 PM    AST 62 10/26/2022 03:18 PM    ALT 35 10/26/2022 03:18 PM       POC Tests: No results for input(s): POCGLU, POCNA, POCK, POCCL, POCBUN, POCHEMO, POCHCT in the last 72 hours.     Coags:   Lab Results   Component Value Date/Time    PROTIME 11.7 09/24/2020 06:08 AM    INR 1.01 09/24/2020 06:08 AM    APTT 27.0 09/24/2020 06:08 AM       HCG (If Applicable): No results found for: PREGTESTUR, PREGSERUM, HCG, HCGQUANT     ABGs: No results found for: PHART, PO2ART, DKK3SJI, MYO6RGI, BEART, M6UFLXCQ     Type & Screen (If Applicable):  No results found for: LABABO, LABRH    Drug/Infectious Status (If Applicable):  No results found for: HIV, HEPCAB    COVID-19 Screening (If Applicable):   Lab Results   Component Value Date/Time    COVID19 Not Detected 01/18/2021 09:05 AM    COVID19 NOT DETECTED 01/04/2021 01:55 PM         Anesthesia Evaluation  Patient summary reviewed no history of anesthetic complications:   Airway: Mallampati: II  TM distance: >3 FB   Neck ROM: full  Mouth opening: > = 3 FB   Dental:      Comment: Chips bottom teeth  Upper edentulous    Pulmonary: breath sounds clear to auscultation                            ROS comment: Former smoker Cardiovascular:  Exercise tolerance: good (>4 METS),   (+) hyperlipidemia    (-) CABG/stent, dysrhythmias and  angina      Rhythm: regular  Rate: normal                 ROS comment: Summary   Left ventricular systolic function is normal with ejection fraction   estimated at 60 %. Strain -19.5   No regional wall motion abnormalities are noted. Normal left ventricular wall thickness. Left ventricle size is normal.   Normal left ventricular wall thickness. Mild-to-moderate aortic regurgitation is present. Mild pulmonic regurgitation present. IVC is normal in size (< 2.1 cm) and collapses > 50% with respiration   consistent with normal RA pressure (3 mmHg). Previous echo done 9/2019 - EF 55-60%        Neuro/Psych:      (-) seizures, TIA and CVA           GI/Hepatic/Renal:   (+) GERD (no symptoms today):,           Endo/Other:    (+) malignancy/cancer. Abdominal:             Vascular: Other Findings:             Anesthesia Plan      MAC     ASA 3       Induction: intravenous. MIPS: Postoperative opioids intended, Prophylactic antiemetics administered and Postoperative trial extubation. Anesthetic plan and risks discussed with patient. Use of blood products discussed with patient whom consented to blood products. Plan discussed with CRNA.                     Gina La MD   11/9/2022

## 2022-12-05 ENCOUNTER — HOSPITAL ENCOUNTER (OUTPATIENT)
Age: 62
Discharge: HOME | End: 2022-12-05
Payer: COMMERCIAL

## 2022-12-05 ENCOUNTER — TELEPHONE (OUTPATIENT)
Dept: INTERVENTIONAL RADIOLOGY/VASCULAR | Age: 62
End: 2022-12-05

## 2022-12-05 LAB
ALBUMIN SERPL-MCNC: 3.7 G/DL (ref 3.4–5)
ANION GAP SERPL CALCULATED.3IONS-SCNC: 7 MMOL/L (ref 3–16)
BACTERIA: ABNORMAL /HPF
BASOPHILS ABSOLUTE: 0.1 K/UL (ref 0–0.2)
BASOPHILS RELATIVE PERCENT: 1.2 %
BILIRUBIN URINE: NEGATIVE
BLOOD, URINE: NEGATIVE
BUN BLDV-MCNC: 12 MG/DL (ref 7–20)
CALCIUM SERPL-MCNC: 9.1 MG/DL (ref 8.3–10.6)
CHLORIDE BLD-SCNC: 110 MMOL/L (ref 99–110)
CLARITY: CLEAR
CO2: 23 MMOL/L (ref 21–32)
COLOR: YELLOW
CREAT SERPL-MCNC: 1.9 MG/DL (ref 0.8–1.3)
EOSINOPHILS ABSOLUTE: 0.1 K/UL (ref 0–0.6)
EOSINOPHILS RELATIVE PERCENT: 1.4 %
EPITHELIAL CELLS, UA: 0 /HPF (ref 0–5)
GFR SERPL CREATININE-BSD FRML MDRD: 39 ML/MIN/{1.73_M2}
GLUCOSE BLD-MCNC: 91 MG/DL (ref 70–99)
GLUCOSE URINE: NEGATIVE MG/DL
HCT VFR BLD CALC: 34.6 % (ref 40.5–52.5)
HEMOGLOBIN: 11.5 G/DL (ref 13.5–17.5)
HYALINE CASTS: 1 /LPF (ref 0–8)
KETONES, URINE: NEGATIVE MG/DL
LEUKOCYTE ESTERASE, URINE: NEGATIVE
LYMPHOCYTES ABSOLUTE: 1.3 K/UL (ref 1–5.1)
LYMPHOCYTES RELATIVE PERCENT: 21.8 %
MCH RBC QN AUTO: 32.7 PG (ref 26–34)
MCHC RBC AUTO-ENTMCNC: 33.3 G/DL (ref 31–36)
MCV RBC AUTO: 98.2 FL (ref 80–100)
MICROSCOPIC EXAMINATION: YES
MONOCYTES ABSOLUTE: 0.7 K/UL (ref 0–1.3)
MONOCYTES RELATIVE PERCENT: 12.7 %
NEUTROPHILS ABSOLUTE: 3.6 K/UL (ref 1.7–7.7)
NEUTROPHILS RELATIVE PERCENT: 62.9 %
NITRITE, URINE: NEGATIVE
PARATHYROID HORMONE INTACT: 40.6 PG/ML (ref 14–72)
PDW BLD-RTO: 14.8 % (ref 12.4–15.4)
PH UA: 5 (ref 5–8)
PHOSPHORUS: 2.9 MG/DL (ref 2.5–4.9)
PLATELET # BLD: 183 K/UL (ref 135–450)
PMV BLD AUTO: 7.5 FL (ref 5–10.5)
POTASSIUM SERPL-SCNC: 5.2 MMOL/L (ref 3.5–5.1)
PROTEIN UA: ABNORMAL MG/DL
RBC # BLD: 3.53 M/UL (ref 4.2–5.9)
RBC UA: 0 /HPF (ref 0–4)
SODIUM BLD-SCNC: 140 MMOL/L (ref 136–145)
SPECIFIC GRAVITY UA: 1.01 (ref 1–1.03)
URINE TYPE: ABNORMAL
UROBILINOGEN, URINE: 0.2 E.U./DL
WBC # BLD: 5.8 K/UL (ref 4–11)
WBC UA: 1 /HPF (ref 0–5)

## 2022-12-05 PROCEDURE — 36415 COLL VENOUS BLD VENIPUNCTURE: CPT

## 2022-12-05 PROCEDURE — 81001 URINALYSIS AUTO W/SCOPE: CPT

## 2022-12-05 PROCEDURE — 80069 RENAL FUNCTION PANEL: CPT

## 2022-12-05 PROCEDURE — 82306 VITAMIN D 25 HYDROXY: CPT

## 2022-12-05 PROCEDURE — 85025 COMPLETE CBC W/AUTO DIFF WBC: CPT

## 2022-12-05 PROCEDURE — 84156 ASSAY OF PROTEIN URINE: CPT

## 2022-12-05 PROCEDURE — 83970 ASSAY OF PARATHORMONE: CPT

## 2022-12-05 PROCEDURE — 82570 ASSAY OF URINE CREATININE: CPT

## 2022-12-06 LAB
CREATININE URINE: 73.1 MG/DL (ref 39–259)
PROTEIN PROTEIN: 12 MG/DL
PROTEIN/CREAT RATIO: 0.2 MG/DL
VITAMIN D 25-HYDROXY: 63.7 NG/ML

## 2022-12-08 ENCOUNTER — HOSPITAL ENCOUNTER (OUTPATIENT)
Dept: INTERVENTIONAL RADIOLOGY/VASCULAR | Age: 62
Discharge: HOME | End: 2022-12-08
Payer: COMMERCIAL

## 2022-12-08 VITALS
HEIGHT: 71 IN | OXYGEN SATURATION: 100 % | TEMPERATURE: 98.3 F | BODY MASS INDEX: 17.78 KG/M2 | SYSTOLIC BLOOD PRESSURE: 133 MMHG | WEIGHT: 127 LBS | DIASTOLIC BLOOD PRESSURE: 60 MMHG | RESPIRATION RATE: 16 BRPM | HEART RATE: 75 BPM

## 2022-12-08 DIAGNOSIS — C15.9 MALIGNANT NEOPLASM OF ESOPHAGUS, UNSPECIFIED LOCATION (HCC): ICD-10-CM

## 2022-12-08 LAB
ANION GAP SERPL CALCULATED.3IONS-SCNC: 7 MMOL/L (ref 3–16)
BUN BLDV-MCNC: 13 MG/DL (ref 7–20)
CALCIUM SERPL-MCNC: 9.4 MG/DL (ref 8.3–10.6)
CHLORIDE BLD-SCNC: 107 MMOL/L (ref 99–110)
CO2: 23 MMOL/L (ref 21–32)
CREAT SERPL-MCNC: 1.8 MG/DL (ref 0.8–1.3)
GFR SERPL CREATININE-BSD FRML MDRD: 42 ML/MIN/{1.73_M2}
GLUCOSE BLD-MCNC: 92 MG/DL (ref 70–99)
HCT VFR BLD CALC: 37.7 % (ref 40.5–52.5)
HEMOGLOBIN: 12.2 G/DL (ref 13.5–17.5)
INR BLD: 1.1 (ref 0.87–1.14)
MCH RBC QN AUTO: 31.9 PG (ref 26–34)
MCHC RBC AUTO-ENTMCNC: 32.3 G/DL (ref 31–36)
MCV RBC AUTO: 98.5 FL (ref 80–100)
PDW BLD-RTO: 15 % (ref 12.4–15.4)
PLATELET # BLD: 184 K/UL (ref 135–450)
PMV BLD AUTO: 7.3 FL (ref 5–10.5)
POTASSIUM SERPL-SCNC: 4.7 MMOL/L (ref 3.5–5.1)
PROTHROMBIN TIME: 14.1 SEC (ref 11.7–14.5)
RBC # BLD: 3.82 M/UL (ref 4.2–5.9)
SODIUM BLD-SCNC: 137 MMOL/L (ref 136–145)
WBC # BLD: 6.1 K/UL (ref 4–11)

## 2022-12-08 PROCEDURE — 99153 MOD SED SAME PHYS/QHP EA: CPT

## 2022-12-08 PROCEDURE — 36561 INSERT TUNNELED CV CATH: CPT

## 2022-12-08 PROCEDURE — 7100000010 HC PHASE II RECOVERY - FIRST 15 MIN

## 2022-12-08 PROCEDURE — 85027 COMPLETE CBC AUTOMATED: CPT

## 2022-12-08 PROCEDURE — 76937 US GUIDE VASCULAR ACCESS: CPT

## 2022-12-08 PROCEDURE — 2500000003 HC RX 250 WO HCPCS: Performed by: RADIOLOGY

## 2022-12-08 PROCEDURE — 7100000011 HC PHASE II RECOVERY - ADDTL 15 MIN

## 2022-12-08 PROCEDURE — 6370000000 HC RX 637 (ALT 250 FOR IP): Performed by: RADIOLOGY

## 2022-12-08 PROCEDURE — 2580000003 HC RX 258: Performed by: RADIOLOGY

## 2022-12-08 PROCEDURE — 99152 MOD SED SAME PHYS/QHP 5/>YRS: CPT

## 2022-12-08 PROCEDURE — 85610 PROTHROMBIN TIME: CPT

## 2022-12-08 PROCEDURE — 36590 REMOVAL TUNNELED CV CATH: CPT

## 2022-12-08 PROCEDURE — 2709999900 IR PORT PLACEMENT > 5 YEARS

## 2022-12-08 PROCEDURE — 6360000002 HC RX W HCPCS: Performed by: RADIOLOGY

## 2022-12-08 PROCEDURE — 36415 COLL VENOUS BLD VENIPUNCTURE: CPT

## 2022-12-08 PROCEDURE — 77001 FLUOROGUIDE FOR VEIN DEVICE: CPT

## 2022-12-08 PROCEDURE — 80048 BASIC METABOLIC PNL TOTAL CA: CPT

## 2022-12-08 RX ORDER — ACETAMINOPHEN 325 MG/1
650 TABLET ORAL EVERY 4 HOURS PRN
Status: DISCONTINUED | OUTPATIENT
Start: 2022-12-08 | End: 2022-12-09 | Stop reason: HOSPADM

## 2022-12-08 RX ORDER — FENTANYL CITRATE 50 UG/ML
INJECTION, SOLUTION INTRAMUSCULAR; INTRAVENOUS
Status: COMPLETED | OUTPATIENT
Start: 2022-12-08 | End: 2022-12-08

## 2022-12-08 RX ORDER — MIDAZOLAM HYDROCHLORIDE 5 MG/ML
INJECTION, SOLUTION INTRAMUSCULAR; INTRAVENOUS
Status: COMPLETED | OUTPATIENT
Start: 2022-12-08 | End: 2022-12-08

## 2022-12-08 RX ORDER — LIDOCAINE HYDROCHLORIDE AND EPINEPHRINE 10; 10 MG/ML; UG/ML
7.5 INJECTION, SOLUTION INFILTRATION; PERINEURAL ONCE
Status: COMPLETED | OUTPATIENT
Start: 2022-12-08 | End: 2022-12-08

## 2022-12-08 RX ORDER — BUPIVACAINE HYDROCHLORIDE AND EPINEPHRINE 2.5; 5 MG/ML; UG/ML
7.5 INJECTION, SOLUTION EPIDURAL; INFILTRATION; INTRACAUDAL; PERINEURAL ONCE
Status: COMPLETED | OUTPATIENT
Start: 2022-12-08 | End: 2022-12-08

## 2022-12-08 RX ORDER — LIDOCAINE HYDROCHLORIDE 10 MG/ML
10 INJECTION, SOLUTION EPIDURAL; INFILTRATION; INTRACAUDAL; PERINEURAL ONCE
Status: COMPLETED | OUTPATIENT
Start: 2022-12-08 | End: 2022-12-08

## 2022-12-08 RX ADMIN — FENTANYL CITRATE 50 MCG: 50 INJECTION, SOLUTION INTRAMUSCULAR; INTRAVENOUS at 08:33

## 2022-12-08 RX ADMIN — FENTANYL CITRATE 25 MCG: 50 INJECTION, SOLUTION INTRAMUSCULAR; INTRAVENOUS at 09:06

## 2022-12-08 RX ADMIN — LIDOCAINE HYDROCHLORIDE,EPINEPHRINE BITARTRATE 7.5 ML: 10; .01 INJECTION, SOLUTION INFILTRATION; PERINEURAL at 09:41

## 2022-12-08 RX ADMIN — MIDAZOLAM HYDROCHLORIDE 1 MG: 5 INJECTION, SOLUTION INTRAMUSCULAR; INTRAVENOUS at 08:33

## 2022-12-08 RX ADMIN — SODIUM BICARBONATE 2.4 MEQ: 0.2 INJECTION, SOLUTION INTRAVENOUS at 09:41

## 2022-12-08 RX ADMIN — BUPIVACAINE HYDROCHLORIDE AND EPINEPHRINE BITARTRATE 7.5 ML: 2.5; .005 INJECTION, SOLUTION EPIDURAL; INFILTRATION; INTRACAUDAL; PERINEURAL at 09:34

## 2022-12-08 RX ADMIN — Medication: at 09:42

## 2022-12-08 RX ADMIN — MIDAZOLAM HYDROCHLORIDE 0.5 MG: 5 INJECTION, SOLUTION INTRAMUSCULAR; INTRAVENOUS at 09:06

## 2022-12-08 RX ADMIN — MIDAZOLAM HYDROCHLORIDE 1 MG: 5 INJECTION, SOLUTION INTRAMUSCULAR; INTRAVENOUS at 08:30

## 2022-12-08 RX ADMIN — CEFAZOLIN 2000 MG: 1 INJECTION, POWDER, FOR SOLUTION INTRAVENOUS at 08:27

## 2022-12-08 RX ADMIN — LIDOCAINE HYDROCHLORIDE 10 ML: 10 INJECTION, SOLUTION EPIDURAL; INFILTRATION; INTRACAUDAL; PERINEURAL at 09:35

## 2022-12-08 RX ADMIN — FENTANYL CITRATE 50 MCG: 50 INJECTION, SOLUTION INTRAMUSCULAR; INTRAVENOUS at 08:30

## 2022-12-08 ASSESSMENT — PAIN - FUNCTIONAL ASSESSMENT: PAIN_FUNCTIONAL_ASSESSMENT: NONE - DENIES PAIN

## 2022-12-08 NOTE — DISCHARGE INSTRUCTIONS
PORT-A-CATH INSERTION DISCHARGE INSTRUCTIONS    Rest today. Advance to your regular diet as tolerated today. You may apply ice (alternating ice on for 20min, and off for 30 min) to your incision to help with the discomfort of today's procedure. You DO HAVE what is called a \"POWER PORT\", or power injectable port a cath. This means you can have CT scan contrast infused in your port. You can have labs drawn from your new port by an RN (RN only. Not a tech.)  Your Oncologist/Hemotologist will likely be the doctor to \"follow\" the care of your port and help you determine when/if it can be removed. Your Oncologist/Hemotologist will help with questions/concerns. Ordering provider: Dr. Jacqueline Juan port is ready for use after you leave the hospital today. However, you may be Xrayed at times when it is used to confirm placement before use. Interventions such as cathflo, alteplase, or activase may be used at times to return your port to fully function if it ceases to return blood or cannot be flushed. Your doctor will order these interventions. A partially functioning port should not be ignored, as it puts you at risk for developing a blood clot. If you should need emergent care, your provider may opt to use a peripheral line for quick access to help you. Otherwise, tell your providers right away that you have an active Power Port upon arrival so it can be utilized for blood draws and infusions. Today:  Observe the operative area for signs of excessive bleeding. If needed, apply pressure, elevate if able, and contact your surgeon. Observe the operative site for any signs of infection- such as increased pain, redness, fever greater than 101 degrees, swelling, foul odor or drainage. Keep operative site clean and dry. If glued, you may shower in 24 hours. Do not remove dressing, if present, unless instructed to by surgeon. Avoid pulling, pushing, or tugging at incision site.   Apply ice as directed to decrease swelling and aid in healing. Dermabond TOPICAL SKIN ADHESIVE CARE    12/8/2022    Dermabond is a clear, sterile liquid skin adhesive that holds wound/incision edges together. The adhesive will usually remain in place for 5 to 10 days, then naturally wear or slough off your skin. Do not scratch, rub, or pick at the Skin Affix adhesive film. Do not apply liquid or ointment medications, soap, powders or lotions to the incision while the Dermabond is in place. You may shower 24 hours after your surgery and briefly wet the Dermabond. Do not sit in a tub of water or swim. Do not soak or scrub your incision. After showering, gently blot your incision dry. No tape or any type of bandage/covering is required over the Dermabond. SEDATION DISCHARGE INSTRUCTIONS    12/8/2022     Wear your seatbelt home. You are under the influence of drugs. Do not drink alcohol, drive, operate machinery, or make any important decisions or sign any legal documents for 24 hours  A responsible adult needs to be with you for 24 hours. You may experience lightheadedness, dizziness, nausea, heightened emotions and/or sleepiness following surgery. Rest at home today- increase activity as tolerated. Progress slowly to a regular diet and drink plenty of fluids unless your physician has instructed you otherwise. If nausea becomes a problem, call your physician. Coughing, sore throat, and muscle aches are other side effects of anesthesia and should improve with time. Do not drive or operate machinery while taking narcotics. ATTENTION FEMALE PATIENTS: if you use an oral contraceptive or birth control pill, you need to use an additional or alternative method for pregnancy prevention for the next thirty days. Medications given today may render your contraceptive ineffective for this cycle.       The interventional radiologist you saw today does not usually follow-up with you after your procedure. He/she does not change or prescribe medications or treatments. All questions after today should be answered by your prescribing provider Dr. Dimple Child may resume any blood thinners or anitcoagulants (Plavix, coumadin, aspirin, eliquis, etc.)          tomorrow unless otherwise instructed. The interventional radiologist you saw today is Dr. Faviola Metcalf, for your records.

## 2022-12-08 NOTE — PRE SEDATION
Sedation Pre-Procedure Note    Patient Name: Lori Enriquez   YOB: 1960  Room/Bed: Room/bed info not found  Medical Record Number: [id number]  Date: 12/8/2022   Time: 9:37 AM       Indication:  Port removal and new port placement. Consent: I have discussed with the patient and/or the patient representative the indication, alternatives, and the possible risks and/or complications of the planned procedure and the anesthesia methods. The patient and/or patient representative appear to understand and agree to proceed. Vital Signs:   Vitals:    12/08/22 0925   BP: 135/68   Pulse: 63   Resp: 17   Temp:    SpO2: 100%       Past Medical History:   has a past medical history of Anesthesia, Esophageal cancer ([facility] [address] Hyperlipidemia, and Prostate enlargement. Past Surgical History:   has a past surgical history that includes Upper gastrointestinal endoscopy (2017); Colonoscopy; Upper gastrointestinal endoscopy (05/17/2017); Tunneled venous port placement (Right, 02/08/2018); Upper gastrointestinal endoscopy (04/06/2018); Esophagus surgery; Kidney removal (Right, 9/21/2020); and Colonoscopy (N/A, 11/9/2022). Medications:   Scheduled Meds:    lidocaine-EPINEPHrine  7.5 mL IntraDERmal Once    sodium bicarbonate  5 mL IntraVENous Once    topical skin adhesive   Topical Once     Continuous Infusions:   PRN Meds:   Home Meds:   Prior to Admission medications    Medication Sig Start Date End Date Taking?  Authorizing Provider   calcium citrate-vitamin D (CITRICAL + D) 315-250 MG-UNIT TABS per tablet Take 1 tablet by mouth 2 times daily (with meals)    Historical Provider, MD   Probiotic Product (PROBIOTIC-10 PO) Take by mouth  Patient not taking: Reported on 12/5/2022    Historical Provider, MD   ondansetron (ZOFRAN ODT) 4 MG disintegrating tablet Take 1 tablet by mouth every 8 hours as needed for Nausea  Patient not taking: Reported on 11/9/2022 10/26/22   Tiffani Woo MD   medical marijuana Take by mouth as needed. Historical Provider, MD   sildenafil (VIAGRA) 100 MG tablet Take by mouth every morning   Patient not taking: Reported on 11/9/2022    Historical Provider, MD   atorvastatin (LIPITOR) 20 MG tablet 20 mg nightly  4/13/17   Historical Provider, MD   Multiple Vitamins-Minerals (CENTRUM SILVER PO) Take by mouth  Patient not taking: Reported on 11/9/2022    Historical Provider, MD   Misc Natural Products (PROSTATE SUPPORT PO) Take by mouth Indications: super beta prostate    Historical Provider, MD     Coumadin Use Last 7 Days:  no  Antiplatelet drug therapy use last 7 days: no  Other anticoagulant use last 7 days: no  Additional Medication Information:  n/a      Pre-Sedation Documentation and Exam:   I have reviewed the patient's history and review of systems.     Mallampati Airway Assessment:  Mallampati Class II - (soft palate, fauces & uvula are visible)    Prior History of Anesthesia Complications:   none    ASA Classification:  Class 2 - A normal healthy patient with mild systemic disease    Sedation/ Anesthesia Plan:   intravenous sedation    Medications Planned:   midazolam (Versed) intravenously and fentanyl intravenously    Patient is an appropriate candidate for plan of sedation: yes    Electronically signed by Je Dupont MD on 12/8/2022 at 9:37 AM

## 2022-12-08 NOTE — PROGRESS NOTES
Pt on unit from IR report received at bedside. Surgical sites x2 to chest CDI pt denies any pain VSS call light in reach wife on way back. Call light in reach pt remains supine aware of bedrest for one hour.

## 2022-12-09 ENCOUNTER — TELEPHONE (OUTPATIENT)
Dept: INTERVENTIONAL RADIOLOGY/VASCULAR | Age: 62
End: 2022-12-09

## 2023-01-06 ENCOUNTER — HOSPITAL ENCOUNTER (OUTPATIENT)
Dept: CT IMAGING | Age: 63
Discharge: HOME | End: 2023-01-06
Payer: COMMERCIAL

## 2023-01-06 DIAGNOSIS — C15.5 MALIGNANT NEOPLASM OF ABDOMINAL ESOPHAGUS (HCC): ICD-10-CM

## 2023-01-06 PROCEDURE — 71250 CT THORAX DX C-: CPT

## 2023-04-07 ENCOUNTER — HOSPITAL ENCOUNTER (OUTPATIENT)
Dept: CT IMAGING | Age: 63
Discharge: HOME | End: 2023-04-07
Payer: COMMERCIAL

## 2023-04-07 DIAGNOSIS — C15.5 MALIGNANT NEOPLASM OF ABDOMINAL ESOPHAGUS (HCC): ICD-10-CM

## 2023-04-07 PROCEDURE — 74176 CT ABD & PELVIS W/O CONTRAST: CPT

## 2023-04-26 ENCOUNTER — HOSPITAL ENCOUNTER (OUTPATIENT)
Age: 63
Discharge: HOME | End: 2023-04-26
Payer: COMMERCIAL

## 2023-04-26 DIAGNOSIS — N18.32 STAGE 3B CHRONIC KIDNEY DISEASE (HCC): ICD-10-CM

## 2023-04-26 DIAGNOSIS — Z90.5 H/O RIGHT NEPHRECTOMY: ICD-10-CM

## 2023-04-26 DIAGNOSIS — E87.5 HYPERKALEMIA: ICD-10-CM

## 2023-04-26 DIAGNOSIS — N17.9 ACUTE KIDNEY INJURY SUPERIMPOSED ON CHRONIC KIDNEY DISEASE (HCC): ICD-10-CM

## 2023-04-26 DIAGNOSIS — N18.9 ACUTE KIDNEY INJURY SUPERIMPOSED ON CHRONIC KIDNEY DISEASE (HCC): ICD-10-CM

## 2023-04-26 LAB
ALBUMIN SERPL-MCNC: 4.1 G/DL (ref 3.4–5)
ANION GAP SERPL CALCULATED.3IONS-SCNC: 10 MMOL/L (ref 3–16)
BUN SERPL-MCNC: 30 MG/DL (ref 7–20)
CALCIUM SERPL-MCNC: 9.1 MG/DL (ref 8.3–10.6)
CHLORIDE SERPL-SCNC: 109 MMOL/L (ref 99–110)
CO2 SERPL-SCNC: 18 MMOL/L (ref 21–32)
CREAT SERPL-MCNC: 3.3 MG/DL (ref 0.8–1.3)
GFR SERPLBLD CREATININE-BSD FMLA CKD-EPI: 20 ML/MIN/{1.73_M2}
GLUCOSE SERPL-MCNC: 112 MG/DL (ref 70–99)
PHOSPHATE SERPL-MCNC: 3.4 MG/DL (ref 2.5–4.9)
POTASSIUM SERPL-SCNC: 5.1 MMOL/L (ref 3.5–5.1)
SODIUM SERPL-SCNC: 137 MMOL/L (ref 136–145)

## 2023-04-26 PROCEDURE — 80069 RENAL FUNCTION PANEL: CPT

## 2023-04-26 PROCEDURE — 36415 COLL VENOUS BLD VENIPUNCTURE: CPT

## 2023-05-17 ENCOUNTER — HOSPITAL ENCOUNTER (OUTPATIENT)
Age: 63
Discharge: HOME | End: 2023-05-17
Payer: COMMERCIAL

## 2023-05-17 ENCOUNTER — PRE-PROCEDURE TELEPHONE (OUTPATIENT)
Dept: INTERVENTIONAL RADIOLOGY/VASCULAR | Age: 63
End: 2023-05-17

## 2023-05-17 DIAGNOSIS — N17.9 ACUTE KIDNEY INJURY SUPERIMPOSED ON CHRONIC KIDNEY DISEASE (HCC): ICD-10-CM

## 2023-05-17 DIAGNOSIS — N18.9 ACUTE KIDNEY INJURY SUPERIMPOSED ON CHRONIC KIDNEY DISEASE (HCC): ICD-10-CM

## 2023-05-17 DIAGNOSIS — N18.32 STAGE 3B CHRONIC KIDNEY DISEASE (HCC): ICD-10-CM

## 2023-05-17 LAB
25(OH)D3 SERPL-MCNC: 39.6 NG/ML
ALBUMIN SERPL-MCNC: 4 G/DL (ref 3.4–5)
ANION GAP SERPL CALCULATED.3IONS-SCNC: 9 MMOL/L (ref 3–16)
BACTERIA URNS QL MICRO: NORMAL /HPF
BILIRUB UR QL STRIP.AUTO: NEGATIVE
BUN SERPL-MCNC: 26 MG/DL (ref 7–20)
CALCIUM SERPL-MCNC: 9.4 MG/DL (ref 8.3–10.6)
CHLORIDE SERPL-SCNC: 107 MMOL/L (ref 99–110)
CLARITY UR: CLEAR
CO2 SERPL-SCNC: 19 MMOL/L (ref 21–32)
COLOR UR: YELLOW
CREAT SERPL-MCNC: 3 MG/DL (ref 0.8–1.3)
CREAT UR-MCNC: 99.9 MG/DL (ref 39–259)
DEPRECATED RDW RBC AUTO: 14.3 % (ref 12.4–15.4)
EPI CELLS #/AREA URNS AUTO: 0 /HPF (ref 0–5)
GFR SERPLBLD CREATININE-BSD FMLA CKD-EPI: 22 ML/MIN/{1.73_M2}
GLUCOSE SERPL-MCNC: 71 MG/DL (ref 70–99)
GLUCOSE UR STRIP.AUTO-MCNC: NEGATIVE MG/DL
HCT VFR BLD AUTO: 37.5 % (ref 40.5–52.5)
HGB BLD-MCNC: 12.7 G/DL (ref 13.5–17.5)
HGB UR QL STRIP.AUTO: NEGATIVE
HYALINE CASTS #/AREA URNS AUTO: 1 /LPF (ref 0–8)
KETONES UR STRIP.AUTO-MCNC: NEGATIVE MG/DL
LEUKOCYTE ESTERASE UR QL STRIP.AUTO: NEGATIVE
MCH RBC QN AUTO: 32.4 PG (ref 26–34)
MCHC RBC AUTO-ENTMCNC: 33.7 G/DL (ref 31–36)
MCV RBC AUTO: 96 FL (ref 80–100)
MICROALBUMIN UR DL<=1MG/L-MCNC: 7.2 MG/DL
MICROALBUMIN/CREAT UR: 72.1 MG/G (ref 0–30)
NITRITE UR QL STRIP.AUTO: NEGATIVE
PH UR STRIP.AUTO: 5 [PH] (ref 5–8)
PHOSPHATE SERPL-MCNC: 2.8 MG/DL (ref 2.5–4.9)
PLATELET # BLD AUTO: 305 K/UL (ref 135–450)
PMV BLD AUTO: 7.5 FL (ref 5–10.5)
POTASSIUM SERPL-SCNC: 4.5 MMOL/L (ref 3.5–5.1)
PROT UR STRIP.AUTO-MCNC: 30 MG/DL
PROT UR-MCNC: 38 MG/DL
PROT/CREAT UR-RTO: 0.4 MG/DL
PTH-INTACT SERPL-MCNC: 41.8 PG/ML (ref 14–72)
RBC # BLD AUTO: 3.91 M/UL (ref 4.2–5.9)
RBC CLUMPS #/AREA URNS AUTO: 0 /HPF (ref 0–4)
SODIUM SERPL-SCNC: 135 MMOL/L (ref 136–145)
SP GR UR STRIP.AUTO: 1.01 (ref 1–1.03)
UA COMPLETE W REFLEX CULTURE PNL UR: ABNORMAL
UA DIPSTICK W REFLEX MICRO PNL UR: YES
URN SPEC COLLECT METH UR: ABNORMAL
UROBILINOGEN UR STRIP-ACNC: 0.2 E.U./DL
WBC # BLD AUTO: 7.6 K/UL (ref 4–11)
WBC #/AREA URNS AUTO: 1 /HPF (ref 0–5)

## 2023-05-17 PROCEDURE — 82306 VITAMIN D 25 HYDROXY: CPT

## 2023-05-17 PROCEDURE — 84156 ASSAY OF PROTEIN URINE: CPT

## 2023-05-17 PROCEDURE — 80069 RENAL FUNCTION PANEL: CPT

## 2023-05-17 PROCEDURE — 83970 ASSAY OF PARATHORMONE: CPT

## 2023-05-17 PROCEDURE — 82043 UR ALBUMIN QUANTITATIVE: CPT

## 2023-05-17 PROCEDURE — 36415 COLL VENOUS BLD VENIPUNCTURE: CPT

## 2023-05-17 PROCEDURE — 81001 URINALYSIS AUTO W/SCOPE: CPT

## 2023-05-17 PROCEDURE — 82570 ASSAY OF URINE CREATININE: CPT

## 2023-05-17 PROCEDURE — 85027 COMPLETE CBC AUTOMATED: CPT

## 2023-05-22 ENCOUNTER — HOSPITAL ENCOUNTER (INPATIENT)
Age: 63
LOS: 1 days | Discharge: HOME | DRG: 683 | End: 2023-05-23
Attending: INTERNAL MEDICINE | Admitting: INTERNAL MEDICINE
Payer: COMMERCIAL

## 2023-05-22 ENCOUNTER — APPOINTMENT (OUTPATIENT)
Dept: GENERAL RADIOLOGY | Age: 63
DRG: 683 | End: 2023-05-22
Payer: COMMERCIAL

## 2023-05-22 DIAGNOSIS — E87.8 LOW BICARBONATE LEVEL: ICD-10-CM

## 2023-05-22 DIAGNOSIS — N18.30 STAGE 3 CHRONIC KIDNEY DISEASE, UNSPECIFIED WHETHER STAGE 3A OR 3B CKD (HCC): Primary | ICD-10-CM

## 2023-05-22 PROBLEM — C64.9 MALIGNANT NEOPLASM OF KIDNEY (HCC): Status: ACTIVE | Noted: 2020-09-15

## 2023-05-22 PROBLEM — N18.9 ANEMIA OF CHRONIC RENAL FAILURE: Status: ACTIVE | Noted: 2023-05-22

## 2023-05-22 PROBLEM — D47.2 MONOCLONAL GAMMOPATHY OF UNKNOWN SIGNIFICANCE (MGUS): Status: ACTIVE | Noted: 2023-05-22

## 2023-05-22 PROBLEM — C64.9 CLEAR CELL CARCINOMA OF KIDNEY (HCC): Status: ACTIVE | Noted: 2023-05-22

## 2023-05-22 PROBLEM — D63.1 ANEMIA OF CHRONIC RENAL FAILURE: Status: ACTIVE | Noted: 2023-05-22

## 2023-05-22 LAB
ALBUMIN SERPL-MCNC: 3.8 G/DL (ref 3.4–5)
ALBUMIN/GLOB SERPL: 1.2 {RATIO} (ref 1.1–2.2)
ALP SERPL-CCNC: 83 U/L (ref 40–129)
ALT SERPL-CCNC: 26 U/L (ref 10–40)
ANION GAP SERPL CALCULATED.3IONS-SCNC: 9 MMOL/L (ref 3–16)
AST SERPL-CCNC: 30 U/L (ref 15–37)
BACTERIA URNS QL MICRO: NORMAL /HPF
BACTERIA URNS QL MICRO: NORMAL /HPF
BASE EXCESS BLDV CALC-SCNC: -8.5 MMOL/L (ref -3–3)
BASOPHILS # BLD: 0.1 K/UL (ref 0–0.2)
BASOPHILS NFR BLD: 1.1 %
BILIRUB SERPL-MCNC: 0.3 MG/DL (ref 0–1)
BILIRUB UR QL STRIP.AUTO: NEGATIVE
BILIRUB UR QL STRIP.AUTO: NEGATIVE
BUN SERPL-MCNC: 30 MG/DL (ref 7–20)
CALCIUM SERPL-MCNC: 8.7 MG/DL (ref 8.3–10.6)
CHLORIDE SERPL-SCNC: 111 MMOL/L (ref 99–110)
CLARITY UR: CLEAR
CLARITY UR: CLEAR
CO2 BLDV-SCNC: 43 MMOL/L
CO2 SERPL-SCNC: 17 MMOL/L (ref 21–32)
COHGB MFR BLDV: 0.3 % (ref 0–1.5)
COLOR UR: YELLOW
COLOR UR: YELLOW
CREAT SERPL-MCNC: 3.5 MG/DL (ref 0.8–1.3)
DEPRECATED RDW RBC AUTO: 14.7 % (ref 12.4–15.4)
DO-HGB MFR BLDV: 5 %
EOSINOPHIL # BLD: 0.1 K/UL (ref 0–0.6)
EOSINOPHIL NFR BLD: 1.5 %
EPI CELLS #/AREA URNS AUTO: 0 /HPF (ref 0–5)
EPI CELLS #/AREA URNS AUTO: 0 /HPF (ref 0–5)
GFR SERPLBLD CREATININE-BSD FMLA CKD-EPI: 19 ML/MIN/{1.73_M2}
GLUCOSE SERPL-MCNC: 102 MG/DL (ref 70–99)
GLUCOSE UR STRIP.AUTO-MCNC: NEGATIVE MG/DL
GLUCOSE UR STRIP.AUTO-MCNC: NEGATIVE MG/DL
HCO3 BLDV-SCNC: 18.1 MMOL/L (ref 23–29)
HCT VFR BLD AUTO: 34.8 % (ref 40.5–52.5)
HGB BLD-MCNC: 11.3 G/DL (ref 13.5–17.5)
HGB UR QL STRIP.AUTO: NEGATIVE
HGB UR QL STRIP.AUTO: NEGATIVE
HYALINE CASTS #/AREA URNS AUTO: 1 /LPF (ref 0–8)
HYALINE CASTS #/AREA URNS AUTO: 3 /LPF (ref 0–8)
KETONES UR STRIP.AUTO-MCNC: NEGATIVE MG/DL
KETONES UR STRIP.AUTO-MCNC: NEGATIVE MG/DL
LEUKOCYTE ESTERASE UR QL STRIP.AUTO: NEGATIVE
LEUKOCYTE ESTERASE UR QL STRIP.AUTO: NEGATIVE
LYMPHOCYTES # BLD: 0.8 K/UL (ref 1–5.1)
LYMPHOCYTES NFR BLD: 10.5 %
MCH RBC QN AUTO: 31.8 PG (ref 26–34)
MCHC RBC AUTO-ENTMCNC: 32.6 G/DL (ref 31–36)
MCV RBC AUTO: 97.4 FL (ref 80–100)
METHGB MFR BLDV: 1 %
MONOCYTES # BLD: 0.8 K/UL (ref 0–1.3)
MONOCYTES NFR BLD: 9.6 %
NEUTROPHILS # BLD: 6.2 K/UL (ref 1.7–7.7)
NEUTROPHILS NFR BLD: 77.3 %
NITRITE UR QL STRIP.AUTO: NEGATIVE
NITRITE UR QL STRIP.AUTO: NEGATIVE
O2 CT VFR BLDV CALC: 15 VOL %
O2 THERAPY: ABNORMAL
PCO2 BLDV: 40.7 MMHG (ref 40–50)
PH BLDV: 7.26 [PH] (ref 7.35–7.45)
PH UR STRIP.AUTO: 5 [PH] (ref 5–8)
PH UR STRIP.AUTO: 5.5 [PH] (ref 5–8)
PLATELET # BLD AUTO: 287 K/UL (ref 135–450)
PMV BLD AUTO: 6.5 FL (ref 5–10.5)
PO2 BLDV: 117 MMHG (ref 25–40)
POTASSIUM SERPL-SCNC: 4.5 MMOL/L (ref 3.5–5.1)
PROT SERPL-MCNC: 7 G/DL (ref 6.4–8.2)
PROT UR STRIP.AUTO-MCNC: 30 MG/DL
PROT UR STRIP.AUTO-MCNC: 30 MG/DL
RBC # BLD AUTO: 3.57 M/UL (ref 4.2–5.9)
RBC CLUMPS #/AREA URNS AUTO: 0 /HPF (ref 0–4)
RBC CLUMPS #/AREA URNS AUTO: 0 /HPF (ref 0–4)
SAO2 % BLDV: 95 %
SODIUM SERPL-SCNC: 137 MMOL/L (ref 136–145)
SP GR UR STRIP.AUTO: 1.01 (ref 1–1.03)
SP GR UR STRIP.AUTO: 1.01 (ref 1–1.03)
UA COMPLETE W REFLEX CULTURE PNL UR: ABNORMAL
UA DIPSTICK W REFLEX MICRO PNL UR: YES
UA DIPSTICK W REFLEX MICRO PNL UR: YES
URN SPEC COLLECT METH UR: ABNORMAL
URN SPEC COLLECT METH UR: ABNORMAL
UROBILINOGEN UR STRIP-ACNC: 0.2 E.U./DL
UROBILINOGEN UR STRIP-ACNC: 0.2 E.U./DL
WBC # BLD AUTO: 8 K/UL (ref 4–11)
WBC #/AREA URNS AUTO: 0 /HPF (ref 0–5)
WBC #/AREA URNS AUTO: 0 /HPF (ref 0–5)

## 2023-05-22 PROCEDURE — 2500000003 HC RX 250 WO HCPCS: Performed by: INTERNAL MEDICINE

## 2023-05-22 PROCEDURE — 84300 ASSAY OF URINE SODIUM: CPT

## 2023-05-22 PROCEDURE — 82570 ASSAY OF URINE CREATININE: CPT

## 2023-05-22 PROCEDURE — 6370000000 HC RX 637 (ALT 250 FOR IP): Performed by: INTERNAL MEDICINE

## 2023-05-22 PROCEDURE — 99285 EMERGENCY DEPT VISIT HI MDM: CPT

## 2023-05-22 PROCEDURE — 84133 ASSAY OF URINE POTASSIUM: CPT

## 2023-05-22 PROCEDURE — 6360000002 HC RX W HCPCS: Performed by: INTERNAL MEDICINE

## 2023-05-22 PROCEDURE — 2580000003 HC RX 258: Performed by: INTERNAL MEDICINE

## 2023-05-22 PROCEDURE — 81001 URINALYSIS AUTO W/SCOPE: CPT

## 2023-05-22 PROCEDURE — G0378 HOSPITAL OBSERVATION PER HR: HCPCS

## 2023-05-22 PROCEDURE — 82803 BLOOD GASES ANY COMBINATION: CPT

## 2023-05-22 PROCEDURE — 71045 X-RAY EXAM CHEST 1 VIEW: CPT

## 2023-05-22 PROCEDURE — 36415 COLL VENOUS BLD VENIPUNCTURE: CPT

## 2023-05-22 PROCEDURE — 85025 COMPLETE CBC W/AUTO DIFF WBC: CPT

## 2023-05-22 PROCEDURE — 80053 COMPREHEN METABOLIC PANEL: CPT

## 2023-05-22 PROCEDURE — 82436 ASSAY OF URINE CHLORIDE: CPT

## 2023-05-22 RX ORDER — SODIUM CHLORIDE 0.9 % (FLUSH) 0.9 %
5-40 SYRINGE (ML) INJECTION PRN
Status: DISCONTINUED | OUTPATIENT
Start: 2023-05-22 | End: 2023-05-23 | Stop reason: HOSPADM

## 2023-05-22 RX ORDER — SODIUM CHLORIDE 0.9 % (FLUSH) 0.9 %
5-40 SYRINGE (ML) INJECTION EVERY 12 HOURS SCHEDULED
Status: DISCONTINUED | OUTPATIENT
Start: 2023-05-22 | End: 2023-05-23 | Stop reason: HOSPADM

## 2023-05-22 RX ORDER — ONDANSETRON 2 MG/ML
4 INJECTION INTRAMUSCULAR; INTRAVENOUS EVERY 6 HOURS PRN
Status: DISCONTINUED | OUTPATIENT
Start: 2023-05-22 | End: 2023-05-23 | Stop reason: HOSPADM

## 2023-05-22 RX ORDER — SODIUM CHLORIDE 9 MG/ML
INJECTION, SOLUTION INTRAVENOUS PRN
Status: DISCONTINUED | OUTPATIENT
Start: 2023-05-22 | End: 2023-05-23 | Stop reason: HOSPADM

## 2023-05-22 RX ORDER — POLYETHYLENE GLYCOL 3350 17 G/17G
17 POWDER, FOR SOLUTION ORAL DAILY PRN
Status: DISCONTINUED | OUTPATIENT
Start: 2023-05-22 | End: 2023-05-23 | Stop reason: HOSPADM

## 2023-05-22 RX ORDER — ACETAMINOPHEN 650 MG/1
650 SUPPOSITORY RECTAL EVERY 6 HOURS PRN
Status: DISCONTINUED | OUTPATIENT
Start: 2023-05-22 | End: 2023-05-23 | Stop reason: HOSPADM

## 2023-05-22 RX ORDER — DIPHENOXYLATE HYDROCHLORIDE AND ATROPINE SULFATE 2.5; .025 MG/1; MG/1
2 TABLET ORAL 4 TIMES DAILY PRN
COMMUNITY

## 2023-05-22 RX ORDER — ONDANSETRON 4 MG/1
4 TABLET, ORALLY DISINTEGRATING ORAL EVERY 8 HOURS PRN
Status: DISCONTINUED | OUTPATIENT
Start: 2023-05-22 | End: 2023-05-23 | Stop reason: HOSPADM

## 2023-05-22 RX ORDER — HEPARIN SODIUM 5000 [USP'U]/ML
5000 INJECTION, SOLUTION INTRAVENOUS; SUBCUTANEOUS EVERY 8 HOURS SCHEDULED
Status: DISCONTINUED | OUTPATIENT
Start: 2023-05-22 | End: 2023-05-23 | Stop reason: HOSPADM

## 2023-05-22 RX ORDER — ATORVASTATIN CALCIUM 20 MG/1
20 TABLET, FILM COATED ORAL NIGHTLY
Status: DISCONTINUED | OUTPATIENT
Start: 2023-05-22 | End: 2023-05-23 | Stop reason: HOSPADM

## 2023-05-22 RX ORDER — ACETAMINOPHEN 325 MG/1
650 TABLET ORAL EVERY 6 HOURS PRN
Status: DISCONTINUED | OUTPATIENT
Start: 2023-05-22 | End: 2023-05-23 | Stop reason: HOSPADM

## 2023-05-22 RX ADMIN — HEPARIN SODIUM 5000 UNITS: 5000 INJECTION INTRAVENOUS; SUBCUTANEOUS at 17:22

## 2023-05-22 RX ADMIN — SODIUM BICARBONATE: 84 INJECTION, SOLUTION INTRAVENOUS at 13:41

## 2023-05-22 RX ADMIN — HEPARIN SODIUM 5000 UNITS: 5000 INJECTION INTRAVENOUS; SUBCUTANEOUS at 21:45

## 2023-05-22 RX ADMIN — ATORVASTATIN CALCIUM 20 MG: 20 TABLET, FILM COATED ORAL at 21:45

## 2023-05-22 ASSESSMENT — PAIN DESCRIPTION - ORIENTATION
ORIENTATION: RIGHT

## 2023-05-22 ASSESSMENT — PAIN SCALES - GENERAL
PAINLEVEL_OUTOF10: 2
PAINLEVEL_OUTOF10: 5
PAINLEVEL_OUTOF10: 6

## 2023-05-22 ASSESSMENT — ENCOUNTER SYMPTOMS
SORE THROAT: 0
RHINORRHEA: 0
VOMITING: 0
SHORTNESS OF BREATH: 0
NAUSEA: 0
COUGH: 0
DIARRHEA: 0
BACK PAIN: 0
ABDOMINAL PAIN: 0
CONSTIPATION: 0
EYE PAIN: 0

## 2023-05-22 ASSESSMENT — PAIN DESCRIPTION - LOCATION
LOCATION: BACK
LOCATION: BACK;SHOULDER
LOCATION: BACK;SHOULDER

## 2023-05-22 ASSESSMENT — PAIN - FUNCTIONAL ASSESSMENT: PAIN_FUNCTIONAL_ASSESSMENT: 0-10

## 2023-05-22 NOTE — H&P
removal (Right, 9/21/2020); Colonoscopy (N/A, 11/9/2022); and IR PORT PLACEMENT > 5 YEARS (12/8/2022). Allergies: No Known Allergies  Fam HX:   family history is not on file. Soc HX:   Social History     Socioeconomic History    Marital status:      Spouse name: None    Number of children: None    Years of education: None    Highest education level: None   Tobacco Use    Smoking status: Former     Packs/day: 0.50     Years: 10.00     Pack years: 5.00     Types: Cigarettes    Smokeless tobacco: Never   Vaping Use    Vaping Use: Never used   Substance and Sexual Activity    Alcohol use: Yes     Comment: occasionally     Drug use: Yes     Types: Marijuana Willma Alexys)       Medications:   Medications:    Infusions:    sodium bicarbonate infusion       PRN Meds:      Labs      CBC:   Recent Labs     05/22/23  1134   WBC 8.0   HGB 11.3*        BMP:    Recent Labs     05/22/23  1134      K 4.5   *   CO2 17*   BUN 30*   CREATININE 3.5*   GLUCOSE 102*     Hepatic:   Recent Labs     05/22/23  1134   AST 30   ALT 26   BILITOT 0.3   ALKPHOS 83     Lipids:   Lab Results   Component Value Date/Time    CHOL 128 09/07/2021 08:42 AM    HDL 50 09/07/2021 08:42 AM    HDL 43 01/24/2012 08:59 AM    TRIG 57 09/07/2021 08:42 AM     Hemoglobin A1C: No results found for: LABA1C  TSH:   Lab Results   Component Value Date/Time    TSH 0.76 10/17/2022 01:58 PM     Troponin: No results found for: TROPONINT  Lactic Acid: No results for input(s): LACTA in the last 72 hours. BNP: No results for input(s): PROBNP in the last 72 hours.   UA:  Lab Results   Component Value Date/Time    NITRU Negative 05/22/2023 11:33 AM    COLORU Yellow 05/22/2023 11:33 AM    PHUR 5.0 05/22/2023 11:33 AM    WBCUA 0 05/22/2023 11:33 AM    RBCUA 0 05/22/2023 11:33 AM    BACTERIA None Seen 05/22/2023 11:33 AM    CLARITYU Clear 05/22/2023 11:33 AM    SPECGRAV 1.015 05/22/2023 11:33 AM    LEUKOCYTESUR Negative 05/22/2023 11:33 AM    UROBILINOGEN 0.2

## 2023-05-22 NOTE — CARE COORDINATION
Patient admitted as Observation with an anticipated short hospitalization length of stay. Chart reviewed and it appears that patient has minimal needs for discharge at this time. Discussed with patients nurse and requested that case management be notified if discharge needs are identified. Patient is from home w/spouse. Has an active PCP and insurance. *Case management will continue to follow progress and update discharge plan as needed.     Electronically signed by JIA Child, BRANDON on 5/22/2023 at 2:43 PM

## 2023-05-22 NOTE — FLOWSHEET NOTE
Pt arrived to unit on stretcher from E.R. with continuous fluids running via Left chest port. C/o right shoulder and back pain. Tele placed. Pt refusing 4 eye skin assessment. Per pt \" has a history of chronic diarrhea, that I have been dealing with for a year now. I'm on a medication to slow it down. I see an infectious disease doctor for it. \"  Charge RN aware. See below vitals. Call light, bedside table, and all personal belongings are within reach. 05/22/23 1545   Vital Signs   Temp 98.2 °F (36.8 °C)   Temp Source Oral   Pulse 67   Heart Rate Source Monitor   Respirations 18   BP (!) 143/83   MAP (Calculated) 103   Level of Consciousness 0   MEWS Score 1   Pain Assessment   Pain Assessment 0-10   Pain Level 5   Pain Location Back; Shoulder   Pain Orientation Right   Oxygen Therapy   SpO2 100 %   O2 Device None (Room air)

## 2023-05-22 NOTE — CONSULTS
[facility]  Consultation Note    Reason for Consult:  ROCAEL on CKD 3b, Metabolic Acidosis  Requesting Physician:  Dr. Mike Gusman:  Worsening crea    History obtained from records and patient. HISTORY OF PRESENT ILLNESS:                Jodie Connolly  is 58 y.o. y.o. male with significant past medical history of CKD 3b/4, new baseline crea may be in the low 3's, was 2.2 on 4/12/23 but 3.3 on 4/26/23, right nephrectomy thought to be from CA, stage 4 Esophageal CA, SPEP +IgG kappa, seen by Dr. Lester Suazo, possible MGUS, non-nephrotic range proteinuria who presents with higher crea and lower serum HCO3. Crea 3.5 this time with HCO3 of 17 and Agap of 9. Has been having more diarrhea.   +Dizziness. Lowest sbp 110's. Denies any change in urine output. Past Medical History:     has a past medical history of Anesthesia, Esophageal cancer ([address] Hyperlipidemia, and Prostate enlargement. Past Surgical History:     has a past surgical history that includes Upper gastrointestinal endoscopy (2017); Colonoscopy; Upper gastrointestinal endoscopy (05/17/2017); Tunneled venous port placement (Right, 02/08/2018); Upper gastrointestinal endoscopy (04/06/2018); Esophagus surgery; Kidney removal (Right, 9/21/2020); Colonoscopy (N/A, 11/9/2022); and IR PORT PLACEMENT > 5 YEARS (12/8/2022). Current Medications:    No current facility-administered medications for this encounter. Allergies:    Patient has no known allergies. Social History:     reports that he has quit smoking. His smoking use included cigarettes. He has a 5.00 pack-year smoking history. He has never used smokeless tobacco. He reports current alcohol use. He reports current drug use. Drug: Marijuana Santosh Segovia). Family History:   family history is not on file.      REVIEW OF SYSTEMS:    System review was done , pertinent positives are mentioned in the HPI    Positive fatigue  No chest pain nor palpitations  No SOB,

## 2023-05-22 NOTE — ED NOTES
Dr. Haim Franz and wife at bedside     Minhchao Severe, [facility]  05/22/23 1257

## 2023-05-22 NOTE — ED NOTES
Patient arrives with port to L chest accessed. [facility]       Azeb Norman RN  05/22/23 [id number]

## 2023-05-23 VITALS
TEMPERATURE: 99 F | OXYGEN SATURATION: 100 % | HEIGHT: 71 IN | BODY MASS INDEX: 17.39 KG/M2 | DIASTOLIC BLOOD PRESSURE: 85 MMHG | HEART RATE: 64 BPM | WEIGHT: 124.2 LBS | RESPIRATION RATE: 18 BRPM | SYSTOLIC BLOOD PRESSURE: 152 MMHG

## 2023-05-23 PROBLEM — N17.9 AKI (ACUTE KIDNEY INJURY) (HCC): Status: ACTIVE | Noted: 2023-05-23

## 2023-05-23 LAB
ANION GAP SERPL CALCULATED.3IONS-SCNC: 6 MMOL/L (ref 3–16)
BASOPHILS # BLD: 0.1 K/UL (ref 0–0.2)
BASOPHILS NFR BLD: 0.8 %
BUN SERPL-MCNC: 26 MG/DL (ref 7–20)
CALCIUM SERPL-MCNC: 8.6 MG/DL (ref 8.3–10.6)
CHLORIDE SERPL-SCNC: 107 MMOL/L (ref 99–110)
CHLORIDE UR-SCNC: 157 MMOL/L
CO2 SERPL-SCNC: 26 MMOL/L (ref 21–32)
CREAT SERPL-MCNC: 3.2 MG/DL (ref 0.8–1.3)
CREAT UR-MCNC: 96.1 MG/DL (ref 39–259)
DEPRECATED RDW RBC AUTO: 14.1 % (ref 12.4–15.4)
EOSINOPHIL # BLD: 0.1 K/UL (ref 0–0.6)
EOSINOPHIL NFR BLD: 1.6 %
FERRITIN SERPL IA-MCNC: 267.5 NG/ML (ref 30–400)
GFR SERPLBLD CREATININE-BSD FMLA CKD-EPI: 21 ML/MIN/{1.73_M2}
GLUCOSE SERPL-MCNC: 103 MG/DL (ref 70–99)
HCT VFR BLD AUTO: 31.3 % (ref 40.5–52.5)
HGB BLD-MCNC: 10.4 G/DL (ref 13.5–17.5)
IRON SATN MFR SERPL: 37 % (ref 20–50)
IRON SERPL-MCNC: 67 UG/DL (ref 59–158)
LYMPHOCYTES # BLD: 0.9 K/UL (ref 1–5.1)
LYMPHOCYTES NFR BLD: 11.8 %
MCH RBC QN AUTO: 32 PG (ref 26–34)
MCHC RBC AUTO-ENTMCNC: 33.3 G/DL (ref 31–36)
MCV RBC AUTO: 96 FL (ref 80–100)
MONOCYTES # BLD: 0.9 K/UL (ref 0–1.3)
MONOCYTES NFR BLD: 11 %
NEUTROPHILS # BLD: 5.8 K/UL (ref 1.7–7.7)
NEUTROPHILS NFR BLD: 74.8 %
PLATELET # BLD AUTO: 256 K/UL (ref 135–450)
PMV BLD AUTO: 6.6 FL (ref 5–10.5)
POTASSIUM SERPL-SCNC: 4.4 MMOL/L (ref 3.5–5.1)
POTASSIUM UR-SCNC: 20.8 MMOL/L
RBC # BLD AUTO: 3.26 M/UL (ref 4.2–5.9)
SODIUM SERPL-SCNC: 139 MMOL/L (ref 136–145)
SODIUM UR-SCNC: 135 MMOL/L
TIBC SERPL-MCNC: 179 UG/DL (ref 260–445)
WBC # BLD AUTO: 7.8 K/UL (ref 4–11)

## 2023-05-23 PROCEDURE — 83540 ASSAY OF IRON: CPT

## 2023-05-23 PROCEDURE — 2580000003 HC RX 258: Performed by: INTERNAL MEDICINE

## 2023-05-23 PROCEDURE — 2500000003 HC RX 250 WO HCPCS: Performed by: INTERNAL MEDICINE

## 2023-05-23 PROCEDURE — 85025 COMPLETE CBC W/AUTO DIFF WBC: CPT

## 2023-05-23 PROCEDURE — 1200000000 HC SEMI PRIVATE

## 2023-05-23 PROCEDURE — 82728 ASSAY OF FERRITIN: CPT

## 2023-05-23 PROCEDURE — 80048 BASIC METABOLIC PNL TOTAL CA: CPT

## 2023-05-23 PROCEDURE — 6360000002 HC RX W HCPCS: Performed by: INTERNAL MEDICINE

## 2023-05-23 PROCEDURE — 6370000000 HC RX 637 (ALT 250 FOR IP): Performed by: INTERNAL MEDICINE

## 2023-05-23 PROCEDURE — 83550 IRON BINDING TEST: CPT

## 2023-05-23 PROCEDURE — 36415 COLL VENOUS BLD VENIPUNCTURE: CPT

## 2023-05-23 RX ORDER — SODIUM BICARBONATE 650 MG/1
1300 TABLET ORAL 2 TIMES DAILY
Status: DISCONTINUED | OUTPATIENT
Start: 2023-05-23 | End: 2023-05-23 | Stop reason: HOSPADM

## 2023-05-23 RX ORDER — SODIUM BICARBONATE 650 MG/1
1300 TABLET ORAL 2 TIMES DAILY
Qty: 60 TABLET | Refills: 1 | Status: SHIPPED | OUTPATIENT
Start: 2023-05-23

## 2023-05-23 RX ADMIN — SODIUM BICARBONATE: 84 INJECTION, SOLUTION INTRAVENOUS at 08:23

## 2023-05-23 RX ADMIN — SODIUM BICARBONATE 1300 MG: 650 TABLET ORAL at 12:12

## 2023-05-23 RX ADMIN — HEPARIN SODIUM 5000 UNITS: 5000 INJECTION INTRAVENOUS; SUBCUTANEOUS at 06:01

## 2023-05-23 ASSESSMENT — PAIN SCALES - GENERAL: PAINLEVEL_OUTOF10: 0

## 2023-05-23 NOTE — ED PROVIDER NOTES
[facility]        Pt Name: Jodie Connolly  MRN: [id number]  [place] 1960  Date of evaluation: 5/22/2023  Provider: RITESH Diez  PCP: Kandis Reaves MD  Note Started: 8:54 PM EDT 5/22/23      IRISH. I have evaluated this patient. CHIEF COMPLAINT       Chief Complaint   Patient presents with    Other     Patient states was sent in by nephrologist. States has kidney failure and it's getting worse, not on dialysis. HISTORY OF PRESENT ILLNESS: 1 or more Elements     History from : Patient    Limitations to history : None    Jodei Connolly is a 58 y.o. male who presents to the emergency department due to worsening kidney function. Patient states that his nephrologist told him to come to the emergency department for further evaluation and potentially be admitted to the hospital.  Patient has 1 kidney and is trying to avoid going into dialysis. Patient denies any other symptoms at this time. Nursing Notes were all reviewed and agreed with or any disagreements were addressed in the HPI. REVIEW OF SYSTEMS :      Review of Systems   Constitutional:  Negative for chills, diaphoresis and fever. HENT:  Negative for congestion, rhinorrhea and sore throat. Eyes:  Negative for pain and visual disturbance. Respiratory:  Negative for cough and shortness of breath. Cardiovascular:  Negative for chest pain and leg swelling. Gastrointestinal:  Negative for abdominal pain, constipation, diarrhea, nausea and vomiting. Genitourinary:  Negative for difficulty urinating, dysuria and frequency. Musculoskeletal:  Negative for back pain and neck pain. Skin:  Negative for rash and wound. Neurological:  Negative for dizziness and light-headedness. Positives and Pertinent negatives as per HPI.      SURGICAL HISTORY     Past Surgical History:   Procedure Laterality Date    COLONOSCOPY      COLONOSCOPY N/A 11/9/2022    COLONOSCOPY WITH

## 2023-05-23 NOTE — PROGRESS NOTES
[facility] Note    Patient Active Problem List   Diagnosis    Malignant neoplasm of lower third of esophagus (HCC)    CINV (chemotherapy-induced nausea and vomiting)    Hematemesis    Renal mass    CKD (chronic kidney disease) stage 3, GFR 30-59 ml/min (HCC)    Hypertension    Acute kidney injury superimposed on CKD (HCC)    Hydropneumothorax    Anemia of chronic renal failure    Clear cell carcinoma of kidney (HCC)    Malignant neoplasm of kidney (HCC)    Monoclonal gammopathy of unknown significance (MGUS)       Past Medical History:   has a past medical history of Anesthesia, Esophageal cancer ([address] Hyperlipidemia, and Prostate enlargement. Past Social History:   reports that he has quit smoking. His smoking use included cigarettes. He has a 5.00 pack-year smoking history. He has never used smokeless tobacco. He reports current alcohol use. He reports current drug use. Drug: Marijuana [employer]).     Subjective:    Has chronic diarrhea  Feeling well today  Plans for lung nodule biopsy tomorrow as outpatient    Review of Systems    Objective:      /75   Pulse 66   Temp 99.2 °F (37.3 °C) (Oral)   Resp 18   Ht 5' 11\" (1.803 m)   Wt 124 lb 3.2 oz (56.3 kg)   SpO2 100%   BMI 17.32 kg/m²     Wt Readings from Last 3 Encounters:   05/23/23 124 lb 3.2 oz (56.3 kg)   05/15/23 124 lb (56.2 kg)   04/17/23 127 lb (57.6 kg)       BP Readings from Last 3 Encounters:   05/23/23 119/75   05/15/23 112/86   04/17/23 (!) 140/80     Chest- clear  Heart-regular  Abd-soft  Ext- no edema    Labs  Hemoglobin   Date Value Ref Range Status   05/23/2023 10.4 (L) 13.5 - 17.5 g/dL Final     Hematocrit   Date Value Ref Range Status   05/23/2023 31.3 (L) 40.5 - 52.5 % Final     WBC   Date Value Ref Range Status   05/23/2023 7.8 4.0 - 11.0 K/uL Final     Platelets   Date Value Ref Range Status   05/23/2023 256 135 - 450 K/uL Final     Lab Results   Component Value Date

## 2023-05-23 NOTE — DISCHARGE SUMMARY
Patient: Hazel Flores     Gender: male  : 1960   Age: 58 y.o. MRN: [id number]    Admitting Physician: Araceli Pacheco MD  Discharge Physician: Araceli Pacheco MD     Code Status: Full Code     Admit Date: 2023   Discharge Date:   23    Disposition:  Home    Discharge Diagnoses:  Acute kidney injury on baseline chronic disease stage IV  Mild acidosis    Active Hospital Problems    Diagnosis Date Noted    ROCAEL (acute kidney injury) ([address] [N17.9] 2023    Acute kidney injury superimposed on CKD ([address] [N17.9, N18.9] 2020       Follow-up appointments:  one week    Outpatient to do list: none     Condition at Discharge:  Stable    Hospital Course:   76-year-old male history of CKD stage IV admitted to the hospital with acute kidney very creatinine was up to two-point. On bicarb drip subsequently changed over to oral Diamox creatinine up to 3.2 baseline being around 2.8 is being discharged home on oral bicarb and follow-up with nephrology in 2 weeks time next    Discharge Medications:   Current Discharge Medication List        START taking these medications    Details   sodium bicarbonate 650 MG tablet Take 2 tablets by mouth 2 times daily  Qty: 60 tablet, Refills: 1           Current Discharge Medication List        Current Discharge Medication List        CONTINUE these medications which have NOT CHANGED    Details   diphenoxylate-atropine (LOMOTIL) 2.5-0.025 MG per tablet Take 2 tablets by mouth 4 times daily as needed for Diarrhea (chronic diarrhea). calcium citrate-vitamin D (CITRICAL + D) 315-250 MG-UNIT TABS per tablet Take 1 tablet by mouth 2 times daily (with meals)      medical marijuana Take by mouth as needed.       Misc Natural Products (PROSTATE SUPPORT PO) Take by mouth Indications: super beta prostate           Current Discharge Medication List        STOP taking these medications       Probiotic Product (PROBIOTIC-10 PO) Comments:   Reason for Stopping:         atorvastatin

## 2023-05-23 NOTE — PROGRESS NOTES
Data- discharge order received, pt verbalized agreement to discharge, disposition to previous residence, no needs for HHC/DME. Action- discharge instructions prepared and given to patient, pt verbalized understanding. Medication information packet given r/t NEW and/or CHANGED prescriptions emphasizing name/purpose/side effects, pt verbalized understanding. Discharge instruction summary: Diet- general, Activity- as tolerated, Primary Care Physician as follows: Perez Le [phone] f/u appointment within 1 week, follow up with Dr. Rob Bowser within 1-2 weeks, prescription medications filled [facility]. 1. WEIGHT: Admit Weight - Scale: 123 lb 11.2 oz (56.1 kg) (05/22/23 1109)        Today  Weight - Scale: 124 lb 3.2 oz (56.3 kg) (05/23/23 0813)       2. O2 SAT.: SpO2: 100 % (05/23/23 1220)    Response- Pt belongings gathered, IV removed. Disposition is home (no HHC/DME needs), transported with personal belongings, taken to lobby via w/c, no complications.

## 2023-05-23 NOTE — PLAN OF CARE
Problem: Discharge Planning  Goal: Discharge to home or other facility with appropriate resources  5/23/2023 1046 by Jose Miranda RN  Outcome: Progressing     Problem: Pain  Goal: Verbalizes/displays adequate comfort level or baseline comfort level  5/23/2023 1046 by Jose Miranda RN  Outcome: Progressing     Problem: Safety - Adult  Goal: Free from fall injury  5/23/2023 1046 by Jose Miranda RN  Outcome: Progressing     Problem: ABCDS Injury Assessment  Goal: Absence of physical injury  5/23/2023 1046 by Jose Miranda RN  Outcome: Progressing

## 2023-05-23 NOTE — PROGRESS NOTES
CLINICAL PHARMACY NOTE: MEDS TO BEDS    Total # of Prescriptions Filled: 1   The following medications were delivered to the patient:  Sodium chloride 650 mg    Additional Documentation:  Delivered to patient=signed  Ok to be delivered per [facility] Stewart Carter CPhT

## 2023-05-24 ENCOUNTER — HOSPITAL ENCOUNTER (OUTPATIENT)
Dept: CT IMAGING | Age: 63
Discharge: HOME | End: 2023-05-24
Attending: INTERNAL MEDICINE | Admitting: INTERNAL MEDICINE
Payer: COMMERCIAL

## 2023-05-24 ENCOUNTER — HOSPITAL ENCOUNTER (INPATIENT)
Age: 63
LOS: 1 days | Discharge: HOME | DRG: 200 | End: 2023-05-25
Attending: INTERNAL MEDICINE | Admitting: INTERNAL MEDICINE
Payer: COMMERCIAL

## 2023-05-24 ENCOUNTER — APPOINTMENT (OUTPATIENT)
Dept: GENERAL RADIOLOGY | Age: 63
DRG: 200 | End: 2023-05-24
Attending: INTERNAL MEDICINE
Payer: COMMERCIAL

## 2023-05-24 VITALS
RESPIRATION RATE: 19 BRPM | TEMPERATURE: 98 F | DIASTOLIC BLOOD PRESSURE: 83 MMHG | SYSTOLIC BLOOD PRESSURE: 100 MMHG | OXYGEN SATURATION: 99 % | HEART RATE: 66 BPM

## 2023-05-24 DIAGNOSIS — J95.811 PNEUMOTHORAX AFTER BIOPSY: ICD-10-CM

## 2023-05-24 DIAGNOSIS — R91.1 LUNG NODULE: ICD-10-CM

## 2023-05-24 PROBLEM — N18.4 CKD (CHRONIC KIDNEY DISEASE) STAGE 4, GFR 15-29 ML/MIN (HCC): Status: ACTIVE | Noted: 2023-05-24

## 2023-05-24 PROBLEM — J93.9 PNEUMOTHORAX, RIGHT: Status: ACTIVE | Noted: 2023-05-24

## 2023-05-24 LAB
INR PPP: 1.04 (ref 0.84–1.16)
PROTHROMBIN TIME: 13.6 SEC (ref 11.5–14.8)

## 2023-05-24 PROCEDURE — 99223 1ST HOSP IP/OBS HIGH 75: CPT | Performed by: INTERNAL MEDICINE

## 2023-05-24 PROCEDURE — 2580000003 HC RX 258: Performed by: INTERNAL MEDICINE

## 2023-05-24 PROCEDURE — 88341 IMHCHEM/IMCYTCHM EA ADD ANTB: CPT

## 2023-05-24 PROCEDURE — 1200000000 HC SEMI PRIVATE

## 2023-05-24 PROCEDURE — 77012 CT SCAN FOR NEEDLE BIOPSY: CPT

## 2023-05-24 PROCEDURE — 85610 PROTHROMBIN TIME: CPT

## 2023-05-24 PROCEDURE — 88305 TISSUE EXAM BY PATHOLOGIST: CPT

## 2023-05-24 PROCEDURE — 6360000002 HC RX W HCPCS: Performed by: STUDENT IN AN ORGANIZED HEALTH CARE EDUCATION/TRAINING PROGRAM

## 2023-05-24 PROCEDURE — 6360000002 HC RX W HCPCS: Performed by: INTERNAL MEDICINE

## 2023-05-24 PROCEDURE — 71045 X-RAY EXAM CHEST 1 VIEW: CPT

## 2023-05-24 PROCEDURE — 0BBC3ZX EXCISION OF RIGHT UPPER LUNG LOBE, PERCUTANEOUS APPROACH, DIAGNOSTIC: ICD-10-PCS | Performed by: STUDENT IN AN ORGANIZED HEALTH CARE EDUCATION/TRAINING PROGRAM

## 2023-05-24 PROCEDURE — 32408 CORE NDL BX LNG/MED PERQ: CPT

## 2023-05-24 PROCEDURE — 6370000000 HC RX 637 (ALT 250 FOR IP): Performed by: INTERNAL MEDICINE

## 2023-05-24 PROCEDURE — 36415 COLL VENOUS BLD VENIPUNCTURE: CPT

## 2023-05-24 PROCEDURE — 7100000011 HC PHASE II RECOVERY - ADDTL 15 MIN: Performed by: STUDENT IN AN ORGANIZED HEALTH CARE EDUCATION/TRAINING PROGRAM

## 2023-05-24 PROCEDURE — 7100000010 HC PHASE II RECOVERY - FIRST 15 MIN: Performed by: STUDENT IN AN ORGANIZED HEALTH CARE EDUCATION/TRAINING PROGRAM

## 2023-05-24 PROCEDURE — 99152 MOD SED SAME PHYS/QHP 5/>YRS: CPT

## 2023-05-24 PROCEDURE — 88342 IMHCHEM/IMCYTCHM 1ST ANTB: CPT

## 2023-05-24 RX ORDER — SODIUM CHLORIDE 9 MG/ML
INJECTION, SOLUTION INTRAVENOUS PRN
Status: DISCONTINUED | OUTPATIENT
Start: 2023-05-24 | End: 2023-05-25 | Stop reason: HOSPADM

## 2023-05-24 RX ORDER — HYDROMORPHONE HCL 110MG/55ML
PATIENT CONTROLLED ANALGESIA SYRINGE INTRAVENOUS
Status: DISCONTINUED
Start: 2023-05-24 | End: 2023-05-24 | Stop reason: HOSPADM

## 2023-05-24 RX ORDER — LORAZEPAM 2 MG/ML
0.5 INJECTION INTRAMUSCULAR EVERY 4 HOURS PRN
Status: DISCONTINUED | OUTPATIENT
Start: 2023-05-24 | End: 2023-05-25 | Stop reason: HOSPADM

## 2023-05-24 RX ORDER — SODIUM CHLORIDE 0.9 % (FLUSH) 0.9 %
5-40 SYRINGE (ML) INJECTION EVERY 12 HOURS SCHEDULED
Status: DISCONTINUED | OUTPATIENT
Start: 2023-05-24 | End: 2023-05-25 | Stop reason: HOSPADM

## 2023-05-24 RX ORDER — ONDANSETRON 2 MG/ML
4 INJECTION INTRAMUSCULAR; INTRAVENOUS EVERY 6 HOURS PRN
Status: DISCONTINUED | OUTPATIENT
Start: 2023-05-24 | End: 2023-05-25 | Stop reason: HOSPADM

## 2023-05-24 RX ORDER — ENOXAPARIN SODIUM 100 MG/ML
30 INJECTION SUBCUTANEOUS DAILY
Status: DISCONTINUED | OUTPATIENT
Start: 2023-05-24 | End: 2023-05-25 | Stop reason: HOSPADM

## 2023-05-24 RX ORDER — DIPHENOXYLATE HYDROCHLORIDE AND ATROPINE SULFATE 2.5; .025 MG/1; MG/1
2 TABLET ORAL 4 TIMES DAILY PRN
Status: DISCONTINUED | OUTPATIENT
Start: 2023-05-24 | End: 2023-05-25 | Stop reason: HOSPADM

## 2023-05-24 RX ORDER — POLYETHYLENE GLYCOL 3350 17 G/17G
17 POWDER, FOR SOLUTION ORAL DAILY PRN
Status: DISCONTINUED | OUTPATIENT
Start: 2023-05-24 | End: 2023-05-25 | Stop reason: HOSPADM

## 2023-05-24 RX ORDER — FENTANYL CITRATE 50 UG/ML
INJECTION, SOLUTION INTRAMUSCULAR; INTRAVENOUS PRN
Status: COMPLETED | OUTPATIENT
Start: 2023-05-24 | End: 2023-05-24

## 2023-05-24 RX ORDER — SODIUM BICARBONATE 650 MG/1
1300 TABLET ORAL 2 TIMES DAILY
Status: DISCONTINUED | OUTPATIENT
Start: 2023-05-24 | End: 2023-05-25

## 2023-05-24 RX ORDER — SODIUM CHLORIDE 0.9 % (FLUSH) 0.9 %
5-40 SYRINGE (ML) INJECTION PRN
Status: DISCONTINUED | OUTPATIENT
Start: 2023-05-24 | End: 2023-05-25 | Stop reason: HOSPADM

## 2023-05-24 RX ORDER — ACETAMINOPHEN 650 MG/1
650 SUPPOSITORY RECTAL EVERY 6 HOURS PRN
Status: DISCONTINUED | OUTPATIENT
Start: 2023-05-24 | End: 2023-05-25 | Stop reason: HOSPADM

## 2023-05-24 RX ORDER — CALCIUM CARBONATE-CHOLECALCIFEROL TAB 250 MG-125 UNIT 250-125 MG-UNIT
1 TAB ORAL 2 TIMES DAILY WITH MEALS
Status: DISCONTINUED | OUTPATIENT
Start: 2023-05-24 | End: 2023-05-25 | Stop reason: HOSPADM

## 2023-05-24 RX ORDER — HYDROMORPHONE HYDROCHLORIDE 1 MG/ML
1 INJECTION, SOLUTION INTRAMUSCULAR; INTRAVENOUS; SUBCUTANEOUS
Status: DISCONTINUED | OUTPATIENT
Start: 2023-05-24 | End: 2023-05-25 | Stop reason: HOSPADM

## 2023-05-24 RX ORDER — ACETAMINOPHEN 325 MG/1
650 TABLET ORAL EVERY 6 HOURS PRN
Status: DISCONTINUED | OUTPATIENT
Start: 2023-05-24 | End: 2023-05-25 | Stop reason: HOSPADM

## 2023-05-24 RX ORDER — MIDAZOLAM HYDROCHLORIDE 2 MG/2ML
INJECTION, SOLUTION INTRAMUSCULAR; INTRAVENOUS PRN
Status: COMPLETED | OUTPATIENT
Start: 2023-05-24 | End: 2023-05-24

## 2023-05-24 RX ORDER — SODIUM CHLORIDE 9 MG/ML
INJECTION, SOLUTION INTRAVENOUS CONTINUOUS
Status: DISCONTINUED | OUTPATIENT
Start: 2023-05-24 | End: 2023-05-25 | Stop reason: HOSPADM

## 2023-05-24 RX ORDER — ONDANSETRON 4 MG/1
4 TABLET, ORALLY DISINTEGRATING ORAL EVERY 8 HOURS PRN
Status: DISCONTINUED | OUTPATIENT
Start: 2023-05-24 | End: 2023-05-25 | Stop reason: HOSPADM

## 2023-05-24 RX ADMIN — DIPHENOXYLATE HYDROCHLORIDE AND ATROPINE SULFATE 2 TABLET: 2.5; .025 TABLET ORAL at 20:27

## 2023-05-24 RX ADMIN — SODIUM CHLORIDE, PRESERVATIVE FREE 10 ML: 5 INJECTION INTRAVENOUS at 15:40

## 2023-05-24 RX ADMIN — MIDAZOLAM HYDROCHLORIDE 0.5 MG: 1 INJECTION, SOLUTION INTRAMUSCULAR; INTRAVENOUS at 10:08

## 2023-05-24 RX ADMIN — FENTANYL CITRATE 25 MCG: 50 INJECTION, SOLUTION INTRAMUSCULAR; INTRAVENOUS at 10:12

## 2023-05-24 RX ADMIN — ENOXAPARIN SODIUM 30 MG: 100 INJECTION SUBCUTANEOUS at 15:44

## 2023-05-24 RX ADMIN — HYDROMORPHONE HYDROCHLORIDE 0.5 MG: 1 INJECTION, SOLUTION INTRAMUSCULAR; INTRAVENOUS; SUBCUTANEOUS at 11:35

## 2023-05-24 RX ADMIN — SODIUM CHLORIDE: 9 INJECTION, SOLUTION INTRAVENOUS at 15:44

## 2023-05-24 RX ADMIN — SODIUM CHLORIDE, PRESERVATIVE FREE 10 ML: 5 INJECTION INTRAVENOUS at 22:54

## 2023-05-24 RX ADMIN — SODIUM BICARBONATE 1300 MG: 650 TABLET ORAL at 20:27

## 2023-05-24 RX ADMIN — HYDROMORPHONE HYDROCHLORIDE 1 MG: 1 INJECTION, SOLUTION INTRAMUSCULAR; INTRAVENOUS; SUBCUTANEOUS at 22:53

## 2023-05-24 RX ADMIN — MIDAZOLAM HYDROCHLORIDE 0.5 MG: 1 INJECTION, SOLUTION INTRAMUSCULAR; INTRAVENOUS at 10:12

## 2023-05-24 RX ADMIN — FENTANYL CITRATE 25 MCG: 50 INJECTION, SOLUTION INTRAMUSCULAR; INTRAVENOUS at 10:08

## 2023-05-24 RX ADMIN — HYDROMORPHONE HYDROCHLORIDE 1 MG: 1 INJECTION, SOLUTION INTRAMUSCULAR; INTRAVENOUS; SUBCUTANEOUS at 15:38

## 2023-05-24 RX ADMIN — Medication 1 TABLET: at 20:27

## 2023-05-24 ASSESSMENT — PAIN DESCRIPTION - LOCATION
LOCATION: SHOULDER;CHEST
LOCATION: CHEST

## 2023-05-24 ASSESSMENT — PAIN SCALES - GENERAL
PAINLEVEL_OUTOF10: 3
PAINLEVEL_OUTOF10: 4
PAINLEVEL_OUTOF10: 6
PAINLEVEL_OUTOF10: 8
PAINLEVEL_OUTOF10: 7
PAINLEVEL_OUTOF10: 8

## 2023-05-24 ASSESSMENT — PAIN DESCRIPTION - ORIENTATION
ORIENTATION: RIGHT
ORIENTATION: RIGHT

## 2023-05-24 ASSESSMENT — PAIN SCALES - WONG BAKER: WONGBAKER_NUMERICALRESPONSE: 2

## 2023-05-24 NOTE — PROGRESS NOTES
Received from IR. Pt A&O and stable. R chest tube site intact, connected to wall suction. Pt c/o discomfort on right side. Radiologist notified for pain medication.

## 2023-05-24 NOTE — PRE SEDATION
Sedation Pre-Procedure Note    Patient Name: Hazel Flores   YOB: 1960  Room/Bed: Room/bed info not found  Medical Record Number: [id number]  Date: 5/24/2023   Time: 9:53 AM       Indication:  pt with hx of esophageal cancer with new lung nodules here for right lung nodule biopsy. Consent: I have discussed with the patient and/or the patient representative the indication, alternatives, and the possible risks and/or complications of the planned procedure and the anesthesia methods. The patient and/or patient representative appear to understand and agree to proceed. Vital Signs:   Vitals:    05/24/23 0855   BP: (!) 142/73   Pulse: 56   Temp: 98.2 °F (36.8 °C)   SpO2: 96%       Past Medical History:   has a past medical history of Anesthesia, Esophageal cancer ([address] Hyperlipidemia, and Prostate enlargement. Past Surgical History:   has a past surgical history that includes Upper gastrointestinal endoscopy (2017); Colonoscopy; Upper gastrointestinal endoscopy (05/17/2017); Tunneled venous port placement (Right, 02/08/2018); Upper gastrointestinal endoscopy (04/06/2018); Esophagus surgery; Kidney removal (Right, 9/21/2020); Colonoscopy (N/A, 11/9/2022); and IR PORT PLACEMENT > 5 YEARS (12/8/2022). Medications:   Scheduled Meds:   Continuous Infusions:   PRN Meds:   Home Meds:   Prior to Admission medications    Medication Sig Start Date End Date Taking? Authorizing Provider   sodium bicarbonate 650 MG tablet Take 2 tablets by mouth 2 times daily 5/23/23   Araceli Pacheco MD   diphenoxylate-atropine (LOMOTIL) 2.5-0.025 MG per tablet Take 2 tablets by mouth 4 times daily as needed for Diarrhea (chronic diarrhea). Historical Provider, MD   calcium citrate-vitamin D (CITRICAL + D) 315-250 MG-UNIT TABS per tablet Take 1 tablet by mouth 2 times daily (with meals)    Historical Provider, MD   medical marijuana Take by mouth as needed.     Historical Provider, MD [employer] (PROSTATE

## 2023-05-24 NOTE — PROGRESS NOTES
Pt arrives to preproceudre area in stable condition from home. Vital signs stable. Assessement completed per flowsheet. IV patent. Procedure explained to patient who states understanding. Questions answered. Consent signed.

## 2023-05-24 NOTE — PROGRESS NOTES
Physical Therapy/Occupational Therapy    Hold per [facility]. Requesting to let patient rest today and re-attempt evaluation tomorrow.    Thanks, Mazin Prescott, DPT [id number]

## 2023-05-24 NOTE — BRIEF OP NOTE
Brief Postoperative Note    Dayana Bruno  YOB: 1960  [id number]    Pre-operative Diagnosis: right lung nodule    Post-operative Diagnosis: Same    Procedure: image guided biopsy    Anesthesia: Local and Moderate Sedation    Surgeons/Assistants: Gonzalez Sen MD    Estimated Blood Loss: less than 5    Complications: None    Specimens: Was Obtained: 4 18G samples    Findings: successful CT guided biopsy of RUL nodule. Moderate right pneumothorax seen post procedure. Successful placement of r 8F chest tube.     Electronically signed by Gonzalez Sen MD on 5/24/2023 at 10:54 AM

## 2023-05-24 NOTE — PLAN OF CARE
Problem: Discharge Planning  Goal: Discharge to home or other facility with appropriate resources  Outcome: Progressing  Flowsheets (Taken 5/24/2023 1608)  Discharge to home or other facility with appropriate resources: Identify barriers to discharge with patient and caregiver     Problem: Pain  Goal: Verbalizes/displays adequate comfort level or baseline comfort level  Outcome: Progressing  Flowsheets (Taken 5/24/2023 1345)  Verbalizes/displays adequate comfort level or baseline comfort level: Encourage patient to monitor pain and request assistance     Problem: Safety - Adult  Goal: Free from fall injury  Outcome: Progressing

## 2023-05-24 NOTE — H&P
HOSPITALISTS HISTORY AND PHYSICAL    5/24/2023 1:49 PM    Patient Information:  Deo Adame is a 58 y.o. male [id number]  PCP:  Jayden Whitney MD (Tel: [phone] )    Chief complaint:  Direct admit for R PTX after IR biopsy    History of Present Illness:  Dahlia Torres is a 58 y.o. male with history of renal cell cancer, esophageal cancer, CKD 4 who underwent outpatient IR biopsy of R lung nodule. Patient had right sided pneumothorax after biopsy. R sided chest tube placed by IR. Patient with pain at R chest tube. Denies SOB, HA, abdominal pain, fevers, chills or NS. No cough, hemoptysis or pleurisy. Otherwise complete ROS is negative unless listed above. REVIEW OF SYSTEMS:   Pertinent positives as noted in HPI. All other systems were reviewed and are negative. Past Medical History:   has a past medical history of Anesthesia, Esophageal cancer ([address] Hyperlipidemia, and Prostate enlargement. Past Surgical History:   has a past surgical history that includes Upper gastrointestinal endoscopy (2017); Colonoscopy; Upper gastrointestinal endoscopy (05/17/2017); Tunneled venous port placement (Right, 02/08/2018); Upper gastrointestinal endoscopy (04/06/2018); Esophagus surgery; Kidney removal (Right, 9/21/2020); Colonoscopy (N/A, 11/9/2022); IR PORT PLACEMENT > 5 YEARS (12/8/2022); CT NEEDLE BIOPSY LUNG PERCUTANEOUS (5/24/2023); and CT GUIDED CHEST TUBE (5/24/2023).      Medications:  Current Facility-Administered Medications on File Prior to Encounter   Medication Dose Route Frequency Provider Last Rate Last Admin    HYDROmorphone (DILAUDID) injection 0.5 mg  0.5 mg IntraVENous Q4H PRN Ariana Torres MD   0.5 mg at 05/24/23 1135    HYDROmorphone (DILAUDID) 2 MG/ML injection              Current Outpatient Medications on File Prior to Encounter   Medication Sig Dispense Refill

## 2023-05-24 NOTE — ACP (ADVANCE CARE PLANNING)
Advanced Care Planning Note. Purpose of Encounter: Advanced care planning in light of right pneumothorax  Parties In Attendance: Patient, wife  Decisional Capacity: Yes  Subjective: Patient with chest pain  Objective: Cr 3.2  Goals of Care Determination: Patient wants full support (CPR, vent, surgery, HD, trach, PEG)  Plan:  Chest tube. Pulm, Onc and Renal consults  Code Status: Full code   Time spent on Advanced care Plannin minutes  Advanced Care Planning Documents: Completed advanced directives on chart, wife is the POA.     Anum Dickinson MD  2023 1:49 PM

## 2023-05-24 NOTE — PROGRESS NOTES
4 Eyes Skin Assessment     NAME:  Nohemy Singh  YOB: 1960  MEDICAL RECORD NUMBER:  [id number]    The patient is being assessed for  Admission    I agree that at least one RN has performed a thorough Head to Toe Skin Assessment on the patient. ALL assessment sites listed below have been assessed. Areas assessed by both nurses:    Head, Face, Ears, Shoulders, Back, Chest, Arms, Elbows, Hands, Sacrum. Buttock, Coccyx, Ischium, Legs. Feet and Heels, and Under Medical Devices         Does the Patient have a Wound? Yes wound(s) were present on assessment. LDA wound assessment was Initiated and completed by RN  Patient has a chest tube on the right side. Surrounding skin clean, dry and intact.         Thor Prevention initiated by RN: Yes  Wound Care Orders initiated by RN: No    Pressure Injury (Stage 3,4, Unstageable, DTI, NWPT, and Complex wounds) if present, place Wound referral order by RN under : No    New Ostomies, if present place, Ostomy referral order under : No     Nurse 1 eSignature: Electronically signed by Kevin Fang RN on 5/24/23 at 5:45 PM EDT    **SHARE this note so that the co-signing nurse can place an eSignature**    Nurse 2 eSignature: {Esignature:[id number]}

## 2023-05-25 ENCOUNTER — APPOINTMENT (OUTPATIENT)
Dept: GENERAL RADIOLOGY | Age: 63
DRG: 200 | End: 2023-05-25
Attending: INTERNAL MEDICINE
Payer: COMMERCIAL

## 2023-05-25 VITALS
TEMPERATURE: 97.8 F | DIASTOLIC BLOOD PRESSURE: 76 MMHG | HEART RATE: 63 BPM | HEIGHT: 71 IN | WEIGHT: 127.3 LBS | OXYGEN SATURATION: 99 % | SYSTOLIC BLOOD PRESSURE: 136 MMHG | RESPIRATION RATE: 16 BRPM | BODY MASS INDEX: 17.82 KG/M2

## 2023-05-25 LAB
ANION GAP SERPL CALCULATED.3IONS-SCNC: 3 MMOL/L (ref 3–16)
BASOPHILS # BLD: 0.1 K/UL (ref 0–0.2)
BASOPHILS NFR BLD: 1 %
BUN SERPL-MCNC: 27 MG/DL (ref 7–20)
CALCIUM SERPL-MCNC: 9 MG/DL (ref 8.3–10.6)
CHLORIDE SERPL-SCNC: 105 MMOL/L (ref 99–110)
CO2 SERPL-SCNC: 28 MMOL/L (ref 21–32)
CREAT SERPL-MCNC: 2.8 MG/DL (ref 0.8–1.3)
DEPRECATED RDW RBC AUTO: 13.9 % (ref 12.4–15.4)
EOSINOPHIL # BLD: 0.1 K/UL (ref 0–0.6)
EOSINOPHIL NFR BLD: 1.9 %
GFR SERPLBLD CREATININE-BSD FMLA CKD-EPI: 24 ML/MIN/{1.73_M2}
GLUCOSE SERPL-MCNC: 91 MG/DL (ref 70–99)
HCT VFR BLD AUTO: 31.7 % (ref 40.5–52.5)
HGB BLD-MCNC: 10.8 G/DL (ref 13.5–17.5)
LYMPHOCYTES # BLD: 0.8 K/UL (ref 1–5.1)
LYMPHOCYTES NFR BLD: 11.3 %
MCH RBC QN AUTO: 32.4 PG (ref 26–34)
MCHC RBC AUTO-ENTMCNC: 33.9 G/DL (ref 31–36)
MCV RBC AUTO: 95.6 FL (ref 80–100)
MONOCYTES # BLD: 0.8 K/UL (ref 0–1.3)
MONOCYTES NFR BLD: 11.7 %
NEUTROPHILS # BLD: 5.4 K/UL (ref 1.7–7.7)
NEUTROPHILS NFR BLD: 74.1 %
PLATELET # BLD AUTO: 252 K/UL (ref 135–450)
PMV BLD AUTO: 6.8 FL (ref 5–10.5)
POTASSIUM SERPL-SCNC: 4.4 MMOL/L (ref 3.5–5.1)
RBC # BLD AUTO: 3.32 M/UL (ref 4.2–5.9)
SODIUM SERPL-SCNC: 136 MMOL/L (ref 136–145)
WBC # BLD AUTO: 7.2 K/UL (ref 4–11)

## 2023-05-25 PROCEDURE — 2580000003 HC RX 258: Performed by: INTERNAL MEDICINE

## 2023-05-25 PROCEDURE — 71045 X-RAY EXAM CHEST 1 VIEW: CPT

## 2023-05-25 PROCEDURE — 99233 SBSQ HOSP IP/OBS HIGH 50: CPT | Performed by: INTERNAL MEDICINE

## 2023-05-25 PROCEDURE — 6370000000 HC RX 637 (ALT 250 FOR IP): Performed by: INTERNAL MEDICINE

## 2023-05-25 PROCEDURE — 85025 COMPLETE CBC W/AUTO DIFF WBC: CPT

## 2023-05-25 PROCEDURE — 6360000002 HC RX W HCPCS: Performed by: INTERNAL MEDICINE

## 2023-05-25 PROCEDURE — 80048 BASIC METABOLIC PNL TOTAL CA: CPT

## 2023-05-25 RX ORDER — SODIUM BICARBONATE 650 MG/1
650 TABLET ORAL 2 TIMES DAILY
Status: DISCONTINUED | OUTPATIENT
Start: 2023-05-25 | End: 2023-05-25 | Stop reason: HOSPADM

## 2023-05-25 RX ADMIN — Medication 1 TABLET: at 08:34

## 2023-05-25 RX ADMIN — ENOXAPARIN SODIUM 30 MG: 100 INJECTION SUBCUTANEOUS at 08:34

## 2023-05-25 RX ADMIN — SODIUM CHLORIDE: 9 INJECTION, SOLUTION INTRAVENOUS at 05:14

## 2023-05-25 RX ADMIN — SODIUM CHLORIDE, PRESERVATIVE FREE 10 ML: 5 INJECTION INTRAVENOUS at 08:37

## 2023-05-25 RX ADMIN — SODIUM BICARBONATE 1300 MG: 650 TABLET ORAL at 08:34

## 2023-05-25 ASSESSMENT — PAIN SCALES - GENERAL
PAINLEVEL_OUTOF10: 4
PAINLEVEL_OUTOF10: 2

## 2023-05-25 NOTE — PLAN OF CARE
Problem: Discharge Planning  Goal: Discharge to home or other facility with appropriate resources  Outcome: Completed     Problem: Pain  Goal: Verbalizes/displays adequate comfort level or baseline comfort level  Outcome: Completed     Problem: Safety - Adult  Goal: Free from fall injury  Outcome: Completed     Problem: ABCDS Injury Assessment  Goal: Absence of physical injury  Outcome: Completed     Problem: Respiratory - Adult  Goal: Achieves optimal ventilation and oxygenation  Outcome: Completed

## 2023-05-25 NOTE — CARE COORDINATION
05/25/23 1721   Readmission Assessment   Number of Days since last admission? 1-7 days   Previous Disposition Home with Family   Who is being Interviewed Patient   What was the patient's/caregiver's perception as to why they think they needed to return back to the hospital? Other (Comment)  (Came back for lung biopsy)   Did you visit your Primary Care Physician after you left the hospital, before you returned this time? No   Why weren't you able to visit your PCP? Other (Comment)  (Scheduled for after biopsy)   Did you see a specialist, such as Cardiac, Pulmonary, Orthopedic Physician, etc. after you left the hospital? No   Who advised the patient to return to the hospital? Physician  (Came in for scheduled biopsy)   Does the patient report anything that got in the way of taking their medications? No   In our efforts to provide the best possible care to you and others like you, can you think of anything that we could have done to help you after you left the hospital the first time, so that you might not have needed to return so soon? Other (Comment)  (Patient came in for a scheduled biopsy and needed admission for observation overnight.   No case mgmt needs that could have prevented this planned admission.)     Electronically signed by JIA Patricia, BRANDON on 5/25/2023 at 5:22 PM

## 2023-05-25 NOTE — PROGRESS NOTES
Discharge paperwork given to patient. Port de accessed. All belongings sent with patient. Patient denies further questions. Patient to lobby for pickup.

## 2023-05-25 NOTE — CONSULTS
ONCOLOGY HEMATOLOGY CARE PROGRESS NOTE      SUBJECTIVE:    This is a patient of Dr. Ariela Samayoa. Patient has history of GE junction adenocarcinoma  This was diagnosed 6 years back  He had concurrent radiation and chemotherapy followed by surgery. He was treated with FOLFOX plus Herceptin for some time for residual disease. He also has a history of stage I right-sided kidney cancer status post nephrectomy diagnosed on 9/21/2020. Adjuvant treatment was not indicated. He was noted to have lung nodules on surveillance CT scan chest.  IR needle biopsy was done today. After that patient developed pneumothorax requiring chest tube placement. Otherwise he is doing okay. ROS:       OBJECTIVE        Physical    VITALS:  Patient Vitals for the past 24 hrs:   BP Temp Temp src Pulse Resp SpO2 Height Weight   05/24/23 2016 131/77 98.2 °F (36.8 °C) Oral 57 17 97 % -- --   05/24/23 1608 -- -- -- -- 16 -- -- --   05/24/23 1604 -- -- -- -- -- -- 5' 11\" (1.803 m) 124 lb (56.2 kg)   05/24/23 1538 -- -- -- -- 18 -- -- --   05/24/23 1345 133/79 97.7 °F (36.5 °C) Oral 59 18 97 % -- --       24HR INTAKE/OUTPUT:    Intake/Output Summary (Last 24 hours) at 5/24/2023 2122  Last data filed at 5/24/2023 1345  Gross per 24 hour   Intake --   Output 0 ml   Net 0 ml       Conscious alert and oriented. Neck is supple  Respiratory efforts are normal.  Right-sided chest tube noted. Abdomen is not distended. Extremities no edema  Neuro no focal deficits noted.     DATA:  CBC:    Recent Labs     05/23/23  0504 05/22/23  1134 05/17/23  1136   WBC 7.8 8.0 7.6   NEUTROABS 5.8 6.2  --    LYMPHOPCT 11.8 10.5  --    RBC 3.26* 3.57* 3.91*   HGB 10.4* 11.3* 12.7*   HCT 31.3* 34.8* 37.5*   MCV 96.0 97.4 96.0   MCH 32.0 31.8 32.4   MCHC 33.3 32.6 33.7   RDW 14.1 14.7 14.3    287 305       PT/INR:    Recent Labs     05/24/23  0928 05/22/23  1134   PROT  --  7.0   INR 1.04  --      PTT:  No results for
% injection 5-40 mL, 5-40 mL, IntraVENous, PRN  0.9 % sodium chloride infusion, , IntraVENous, PRN  enoxaparin Sodium (LOVENOX) injection 30 mg, 30 mg, SubCUTAneous, Daily  ondansetron (ZOFRAN-ODT) disintegrating tablet 4 mg, 4 mg, Oral, Q8H PRN **OR** ondansetron (ZOFRAN) injection 4 mg, 4 mg, IntraVENous, Q6H PRN  polyethylene glycol (GLYCOLAX) packet 17 g, 17 g, Oral, Daily PRN  acetaminophen (TYLENOL) tablet 650 mg, 650 mg, Oral, Q6H PRN **OR** acetaminophen (TYLENOL) suppository 650 mg, 650 mg, Rectal, Q6H PRN  0.9 % sodium chloride infusion, , IntraVENous, Continuous  HYDROmorphone HCl PF (DILAUDID) injection 1 mg, 1 mg, IntraVENous, Q3H PRN  LORazepam (ATIVAN) injection 0.5 mg, 0.5 mg, IntraVENous, Q4H PRN  Facility-Administered Medications Ordered in Other Encounters: HYDROmorphone (DILAUDID) 2 MG/ML injection, , ,   Allergies:    Patient has no known allergies. Social History:     reports that he has quit smoking. His smoking use included cigarettes. He has a 5.00 pack-year smoking history. He has never used smokeless tobacco. He reports current alcohol use. He reports current drug use. Drug: Marijuana [employer]). Family History:   family history is not on file.      REVIEW OF SYSTEMS:    System review was done , pertinent positives are mentioned in the HPI    Positive fatigue  No chest pain nor palpitations  No SOB, coughing nor hemoptysis  No abdominal pain, nausea, vomiting nor diarrhea  No fever/chills  No leg swelling  No change in urine output nor gross hematuria    PHYSICAL EXAM:    Vitals:  /79   Pulse 59   Temp 97.7 °F (36.5 °C) (Oral)   Resp 16   Ht 5' 11\" (1.803 m)   Wt 124 lb (56.2 kg)   SpO2 97%   BMI 17.29 kg/m²   CONSTITUTIONAL:  awake, alert, cooperative, no apparent distress, and appears stated age  EYES:  Lids and lashes normal, pupils equal, round   NECK:  Supple, symmetrical, trachea midline, no adenopathy, thyroid symmetric, not enlarged and no tenderness, skin
IMMUNOLOGIC: No palpable lymphadenopathy. NEUROLOGIC: Alert and oriented x 3. Groslly non-focal. Motor strength is 5+/5 in all muscle groups. The patient has a normal sensorium globally. LABS:    LABS:  Recent Labs     05/22/23  1134 05/23/23  0504 05/24/23  0928   WBC 8.0 7.8  --    HGB 11.3* 10.4*  --    HCT 34.8* 31.3*  --     256  --    ALT 26  --   --    AST 30  --   --     139  --    K 4.5 4.4  --    * 107  --    CREATININE 3.5* 3.2*  --    BUN 30* 26*  --    CO2 17* 26  --    INR  --   --  1.04       Recent Labs     05/22/23  1134 05/23/23  0504   GLUCOSE 102* 103*   CALCIUM 8.7 8.6    139   K 4.5 4.4   CO2 17* 26   * 107   BUN 30* 26*   CREATININE 3.5* 3.2*       No results for input(s): PHART, FVR2BIW, PO2ART, PSD9UOZ, J2MWMGYJ, BEART, Z1TJYSBH in the last 72 hours. Lab Results   Component Value Date    INR 1.04 05/24/2023    INR 1.10 12/08/2022    INR 1.01 09/24/2020    PROTIME 13.6 05/24/2023    PROTIME 14.1 12/08/2022    PROTIME 11.7 09/24/2020     No results found for: AMYLASE   No results found for: LABA1C  No results found for: EAG  Lab Results   Component Value Date    TSH 0.76 10/17/2022     Lab Results   Component Value Date    TROPONINI <0.01 10/26/2022      No results found for: CRP   No results found for: BNP   No results found for: [facility]   Lab Results   Component Value Date    FERRITIN 267.5 05/23/2023      Lab Results   Component Value Date    LACTA 1.5 10/26/2022         IMAGING:  I reviewed the chest x-ray done today and my interpretation is as follows. Right lung has fully expanded. No residual pneumothorax seen. Small bore chest tube is located in the inferior portion of the right hemithorax. No focal consolidation, pleural effusion seen.         IMPRESSION:     Right iatrogenic pneumothorax  Right upper lobe pulmonary nodule s/p TTNA  History of stage IV esophageal cancer status post gastric pull-through at [facility], now on

## 2023-05-25 NOTE — PROGRESS NOTES
PULMONARY AND CRITICAL CARE PROGRESS NOTE    Subjective: Patient sitting in bed in no apparent respiratory distress. Right sided chest tube has been on suction overnight and then on waterseal for the last few hours. Repeat chest imaging has not shown any recurrence of pneumothorax. Patient denies any shortness of breath, cough or chest pain. Genaro on room air. REVIEW OF SYSTEMS:   8 point review of system is negative except that mentioned in the subjective portion.     PAST MEDICAL HISTORY:   Past Medical History:   Diagnosis Date    Anesthesia     hypertension with anesthesia    Esophageal cancer ([address]     poss kidney    Hyperlipidemia     Prostate enlargement        PAST SURGICAL HISTORY:   Past Surgical History:   Procedure Laterality Date    COLONOSCOPY      COLONOSCOPY N/A 11/9/2022    COLONOSCOPY WITH BIOPSY performed by Yaneth Huitron MD at [address] [place] [address]  5/24/2023    CT GUIDED CHEST TUBE 5/24/2023 Noe Zazueta MD [facility] CT SCAN    CT NEEDLE BIOPSY LUNG PERCUTANEOUS  5/24/2023    CT NEEDLE BIOPSY LUNG PERCUTANEOUS 5/24/2023 Noe Zazueta MD [facility] CT SCAN    ESOPHAGUS SURGERY      for cancer    IR PORT PLACEMENT EQUAL OR GREATER THAN 5 YEARS  12/8/2022    IR PORT PLACEMENT EQUAL OR GREATER THAN 5 YEARS 12/8/2022 [facility] SPECIAL PROCEDURES    KIDNEY REMOVAL Right 9/21/2020    ROBOTIC LAPAROSCOPIC RIGHT NEPHRECTOMY performed by Lana Neville MD at [facility] Right 02/08/2018    inserted in radiology by Dr Raquel Esquivel as outpt \"power port\"    UPPER GASTROINTESTINAL ENDOSCOPY  2017    UPPER GASTROINTESTINAL ENDOSCOPY  05/17/2017    EUS    UPPER GASTROINTESTINAL ENDOSCOPY  04/06/2018       SOCIAL HISTORY:   Social History     Tobacco Use    Smoking status: Former     Packs/day: 0.50     Years: 10.00     Pack years: 5.00     Types: Cigarettes    Smokeless tobacco: Never   Vaping Use    Vaping Use: Never used   Substance Use Topics    Alcohol use:

## 2023-05-25 NOTE — ACP (ADVANCE CARE PLANNING)
Advanced Care Planning Note. Purpose of Encounter: Advanced care planning in light of right pneumothorax  Parties In Attendance: Patient, wife  Decisional Capacity: Yes  Subjective: Patient with chest pain  Objective: Cr 2.8  Goals of Care Determination: Patient wants full support (CPR, vent, surgery, HD, trach, PEG)  Plan:  Chest tube. Pulm, Onc and Renal consults  Code Status: Full code   Time spent on Advanced care Plannin minutes  Advanced Care Planning Documents: Completed advanced directives on chart, wife is the POA.     Ollie Wood MD  2023 12:20 PM

## 2023-05-25 NOTE — PLAN OF CARE
Problem: Discharge Planning  Goal: Discharge to home or other facility with appropriate resources  5/24/2023 2212 by Sylvia Washington RN  Outcome: Progressing  5/24/2023 2212 by Sylvia Washington RN  Outcome: Progressing  5/24/2023 2212 by Sylvia Washington RN  Outcome: Progressing  5/24/2023 1611 by Osbaldo Springer RN  Outcome: Progressing  Flowsheets  Taken 5/24/2023 1608  Discharge to home or other facility with appropriate resources: Identify barriers to discharge with patient and caregiver  Taken 5/24/2023 1345  Discharge to home or other facility with appropriate resources: Identify barriers to discharge with patient and caregiver     Problem: Pain  Goal: Verbalizes/displays adequate comfort level or baseline comfort level  5/24/2023 2212 by Sylvia Washington RN  Outcome: Progressing  5/24/2023 2212 by Sylvia Washington RN  Outcome: Progressing  5/24/2023 1611 by Osbaldo Springer RN  Outcome: Progressing  Flowsheets (Taken 5/24/2023 1345)  Verbalizes/displays adequate comfort level or baseline comfort level: Encourage patient to monitor pain and request assistance     Problem: Safety - Adult  Goal: Free from fall injury  5/24/2023 2212 by Sylvia Washington RN  Outcome: Progressing  5/24/2023 1611 by Osbaldo Springer RN  Outcome: Progressing     Problem: ABCDS Injury Assessment  Goal: Absence of physical injury  5/24/2023 2212 by Sylvia Washington RN  Outcome: Progressing  5/24/2023 1611 by Osbaldo Springer RN  Outcome: Progressing     Problem: Respiratory - Adult  Goal: Achieves optimal ventilation and oxygenation  Outcome: Progressing

## 2023-05-26 NOTE — DISCHARGE SUMMARY
reduce the radiation dose to as low as reasonably achievable. FINDINGS: As above. Successful CT guided core biopsy of right lung nodules. Successful CT-guided placement of right 8 Malaysian chest tube. CT NEEDLE BIOPSY LUNG PERCUTANEOUS    Result Date: 5/24/2023  PROCEDURE: CT GUIDED CORE BIOPSY LUNG MODERATE CONSCIOUS SEDATION 5/24/2023 HISTORY: ORDERING SYSTEM PROVIDED HISTORY: Lung nodule; ORDERING SYSTEM PROVIDED HISTORY: Pneumothorax after biopsy TECHNOLOGIST PROVIDED HISTORY: Which side should the procedure be performed?->Right Reason for Exam: Pneumothorax after biopsy; ORDERING SYSTEM PROVIDED HISTORY: Pneumothorax after biopsy TECHNOLOGIST PROVIDED HISTORY: Reason for Exam: Pneumothorax after biopsy PHYSICIANS: Koki Carbone SEDATION: MODERATE SEDATION WAS PROVIDED BY THE INTERVENTIONAL RADIOLOGY NURSE UNDER THE SUPERVISION OF THE INTERVENTIONAL RADIOLOGIST FOR 40 MINUTES.  1 MG VERSED AND 50 MCG FENTANYL WAS ADMINISTERED. TECHNIQUE: Informed consent was obtained after a detailed discussion about the procedure including the risk, benefits, and alternatives. Universal protocol was followed. Patient was laid left-side-down on the CT suite table. Sterile gowns, masks, hats and gloves utilized for maximal sterile barrier. Axial images were obtained through the chest and a suitable skin site was prepped and draped in sterile fashion. Local anesthesia was achieved with lidocaine. Core biopsy was performed with CT guidance. Four 18 gauge core biopsy specimens were obtained using coaxial technique and the patient tolerated the procedure well. A clean dry sterile dressing was placed. Moderate pneumothorax was noted post biopsy. A C2 skin site was prepped and draped in usual sterile fashion following CT localization. A 5 Western Randa Yueh catheter was advanced into the right pleural space and an 035 guidewire was used to place an 8 Malaysian chest tube.   Postprocedure imaging demonstrates chest tube in

## 2023-06-05 ENCOUNTER — HOSPITAL ENCOUNTER (OUTPATIENT)
Age: 63
Discharge: HOME | End: 2023-06-05
Payer: COMMERCIAL

## 2023-06-05 DIAGNOSIS — N18.9 ACUTE KIDNEY INJURY SUPERIMPOSED ON CHRONIC KIDNEY DISEASE (HCC): ICD-10-CM

## 2023-06-05 DIAGNOSIS — N17.9 ACUTE KIDNEY INJURY SUPERIMPOSED ON CHRONIC KIDNEY DISEASE (HCC): ICD-10-CM

## 2023-06-05 LAB
ALBUMIN SERPL-MCNC: 3.2 G/DL (ref 3.4–5)
ANION GAP SERPL CALCULATED.3IONS-SCNC: 11 MMOL/L (ref 3–16)
BUN SERPL-MCNC: 42 MG/DL (ref 7–20)
CALCIUM SERPL-MCNC: 8.7 MG/DL (ref 8.3–10.6)
CHLORIDE SERPL-SCNC: 110 MMOL/L (ref 99–110)
CO2 SERPL-SCNC: 15 MMOL/L (ref 21–32)
CREAT SERPL-MCNC: 5.1 MG/DL (ref 0.8–1.3)
DEPRECATED RDW RBC AUTO: 14.7 % (ref 12.4–15.4)
GFR SERPLBLD CREATININE-BSD FMLA CKD-EPI: 12 ML/MIN/{1.73_M2}
GLUCOSE SERPL-MCNC: 86 MG/DL (ref 70–99)
HCT VFR BLD AUTO: 33 % (ref 40.5–52.5)
HGB BLD-MCNC: 11.1 G/DL (ref 13.5–17.5)
MCH RBC QN AUTO: 33 PG (ref 26–34)
MCHC RBC AUTO-ENTMCNC: 33.6 G/DL (ref 31–36)
MCV RBC AUTO: 98.2 FL (ref 80–100)
PHOSPHATE SERPL-MCNC: 3.9 MG/DL (ref 2.5–4.9)
PLATELET # BLD AUTO: 236 K/UL (ref 135–450)
PMV BLD AUTO: 8.2 FL (ref 5–10.5)
POTASSIUM SERPL-SCNC: 5.4 MMOL/L (ref 3.5–5.1)
RBC # BLD AUTO: 3.37 M/UL (ref 4.2–5.9)
SODIUM SERPL-SCNC: 136 MMOL/L (ref 136–145)
WBC # BLD AUTO: 6.8 K/UL (ref 4–11)

## 2023-06-05 PROCEDURE — 85027 COMPLETE CBC AUTOMATED: CPT

## 2023-06-05 PROCEDURE — 80069 RENAL FUNCTION PANEL: CPT

## 2023-06-05 PROCEDURE — 36415 COLL VENOUS BLD VENIPUNCTURE: CPT

## 2023-06-06 ENCOUNTER — APPOINTMENT (OUTPATIENT)
Dept: ULTRASOUND IMAGING | Age: 63
DRG: 674 | End: 2023-06-06
Payer: COMMERCIAL

## 2023-06-06 ENCOUNTER — HOSPITAL ENCOUNTER (INPATIENT)
Age: 63
LOS: 5 days | Discharge: HOME | DRG: 674 | End: 2023-06-11
Attending: INTERNAL MEDICINE | Admitting: INTERNAL MEDICINE
Payer: COMMERCIAL

## 2023-06-06 ENCOUNTER — APPOINTMENT (OUTPATIENT)
Dept: GENERAL RADIOLOGY | Age: 63
DRG: 674 | End: 2023-06-06
Payer: COMMERCIAL

## 2023-06-06 DIAGNOSIS — N17.9 AKI (ACUTE KIDNEY INJURY) (HCC): Primary | ICD-10-CM

## 2023-06-06 DIAGNOSIS — N18.9 CHRONIC KIDNEY DISEASE, UNSPECIFIED CKD STAGE: ICD-10-CM

## 2023-06-06 LAB
ALBUMIN SERPL-MCNC: 3.4 G/DL (ref 3.4–5)
ALBUMIN/GLOB SERPL: 0.9 {RATIO} (ref 1.1–2.2)
ALP SERPL-CCNC: 82 U/L (ref 40–129)
ALT SERPL-CCNC: 13 U/L (ref 10–40)
ANION GAP SERPL CALCULATED.3IONS-SCNC: 9 MMOL/L (ref 3–16)
AST SERPL-CCNC: 23 U/L (ref 15–37)
BACTERIA URNS QL MICRO: NORMAL /HPF
BASE EXCESS BLDV CALC-SCNC: -9.5 MMOL/L (ref -3–3)
BASOPHILS # BLD: 0.1 K/UL (ref 0–0.2)
BASOPHILS NFR BLD: 0.8 %
BILIRUB SERPL-MCNC: 0.3 MG/DL (ref 0–1)
BILIRUB UR QL STRIP.AUTO: NEGATIVE
BUN SERPL-MCNC: 42 MG/DL (ref 7–20)
CALCIUM SERPL-MCNC: 8.8 MG/DL (ref 8.3–10.6)
CHLORIDE SERPL-SCNC: 112 MMOL/L (ref 99–110)
CLARITY UR: CLEAR
CO2 BLDV-SCNC: 40 MMOL/L
CO2 SERPL-SCNC: 15 MMOL/L (ref 21–32)
COHGB MFR BLDV: 0.9 % (ref 0–1.5)
COLOR UR: YELLOW
CREAT SERPL-MCNC: 5.3 MG/DL (ref 0.8–1.3)
DEPRECATED RDW RBC AUTO: 14.7 % (ref 12.4–15.4)
DO-HGB MFR BLDV: 10 %
EOSINOPHIL # BLD: 0 K/UL (ref 0–0.6)
EOSINOPHIL NFR BLD: 0.5 %
EPI CELLS #/AREA URNS AUTO: 0 /HPF (ref 0–5)
GFR SERPLBLD CREATININE-BSD FMLA CKD-EPI: 11 ML/MIN/{1.73_M2}
GLUCOSE BLD-MCNC: 118 MG/DL (ref 70–99)
GLUCOSE BLD-MCNC: 177 MG/DL (ref 70–99)
GLUCOSE BLD-MCNC: 177 MG/DL (ref 70–99)
GLUCOSE SERPL-MCNC: 112 MG/DL (ref 70–99)
GLUCOSE UR STRIP.AUTO-MCNC: NEGATIVE MG/DL
HCO3 BLDV-SCNC: 16.7 MMOL/L (ref 23–29)
HCT VFR BLD AUTO: 32 % (ref 40.5–52.5)
HGB BLD-MCNC: 10.6 G/DL (ref 13.5–17.5)
HGB UR QL STRIP.AUTO: NEGATIVE
HYALINE CASTS #/AREA URNS AUTO: 0 /LPF (ref 0–8)
KETONES UR STRIP.AUTO-MCNC: NEGATIVE MG/DL
LEUKOCYTE ESTERASE UR QL STRIP.AUTO: NEGATIVE
LYMPHOCYTES # BLD: 0.4 K/UL (ref 1–5.1)
LYMPHOCYTES NFR BLD: 5.8 %
MCH RBC QN AUTO: 32.2 PG (ref 26–34)
MCHC RBC AUTO-ENTMCNC: 33.2 G/DL (ref 31–36)
MCV RBC AUTO: 97.2 FL (ref 80–100)
METHGB MFR BLDV: 1.2 %
MONOCYTES # BLD: 0.2 K/UL (ref 0–1.3)
MONOCYTES NFR BLD: 2.4 %
NEUTROPHILS # BLD: 6.2 K/UL (ref 1.7–7.7)
NEUTROPHILS NFR BLD: 90.5 %
NITRITE UR QL STRIP.AUTO: NEGATIVE
O2 CT VFR BLDV CALC: 14 VOL %
O2 THERAPY: ABNORMAL
PCO2 BLDV: 36.3 MMHG (ref 40–50)
PERFORMED ON: ABNORMAL
PH BLDV: 7.27 [PH] (ref 7.35–7.45)
PH UR STRIP.AUTO: 5 [PH] (ref 5–8)
PLATELET # BLD AUTO: 250 K/UL (ref 135–450)
PMV BLD AUTO: 7.1 FL (ref 5–10.5)
PO2 BLDV: 69.9 MMHG (ref 25–40)
POTASSIUM SERPL-SCNC: 5 MMOL/L (ref 3.5–5.1)
POTASSIUM SERPL-SCNC: 5.9 MMOL/L (ref 3.5–5.1)
PROT SERPL-MCNC: 7.1 G/DL (ref 6.4–8.2)
PROT UR STRIP.AUTO-MCNC: ABNORMAL MG/DL
RBC # BLD AUTO: 3.29 M/UL (ref 4.2–5.9)
RBC CLUMPS #/AREA URNS AUTO: 0 /HPF (ref 0–4)
SAO2 % BLDV: 90 %
SODIUM SERPL-SCNC: 136 MMOL/L (ref 136–145)
SP GR UR STRIP.AUTO: 1.01 (ref 1–1.03)
UA COMPLETE W REFLEX CULTURE PNL UR: ABNORMAL
UA DIPSTICK W REFLEX MICRO PNL UR: YES
URN SPEC COLLECT METH UR: ABNORMAL
UROBILINOGEN UR STRIP-ACNC: 0.2 E.U./DL
WBC # BLD AUTO: 6.8 K/UL (ref 4–11)
WBC #/AREA URNS AUTO: 1 /HPF (ref 0–5)

## 2023-06-06 PROCEDURE — 6360000002 HC RX W HCPCS: Performed by: INTERNAL MEDICINE

## 2023-06-06 PROCEDURE — 93005 ELECTROCARDIOGRAM TRACING: CPT | Performed by: INTERNAL MEDICINE

## 2023-06-06 PROCEDURE — 83735 ASSAY OF MAGNESIUM: CPT

## 2023-06-06 PROCEDURE — 71045 X-RAY EXAM CHEST 1 VIEW: CPT

## 2023-06-06 PROCEDURE — 2580000003 HC RX 258: Performed by: INTERNAL MEDICINE

## 2023-06-06 PROCEDURE — 80053 COMPREHEN METABOLIC PANEL: CPT

## 2023-06-06 PROCEDURE — 36415 COLL VENOUS BLD VENIPUNCTURE: CPT

## 2023-06-06 PROCEDURE — 1200000000 HC SEMI PRIVATE

## 2023-06-06 PROCEDURE — 2500000003 HC RX 250 WO HCPCS: Performed by: INTERNAL MEDICINE

## 2023-06-06 PROCEDURE — 99285 EMERGENCY DEPT VISIT HI MDM: CPT

## 2023-06-06 PROCEDURE — 82803 BLOOD GASES ANY COMBINATION: CPT

## 2023-06-06 PROCEDURE — 81001 URINALYSIS AUTO W/SCOPE: CPT

## 2023-06-06 PROCEDURE — 36591 DRAW BLOOD OFF VENOUS DEVICE: CPT

## 2023-06-06 PROCEDURE — 6370000000 HC RX 637 (ALT 250 FOR IP): Performed by: INTERNAL MEDICINE

## 2023-06-06 PROCEDURE — 85025 COMPLETE CBC W/AUTO DIFF WBC: CPT

## 2023-06-06 PROCEDURE — 76770 US EXAM ABDO BACK WALL COMP: CPT

## 2023-06-06 PROCEDURE — 84132 ASSAY OF SERUM POTASSIUM: CPT

## 2023-06-06 RX ORDER — HEPARIN SODIUM 5000 [USP'U]/ML
5000 INJECTION, SOLUTION INTRAVENOUS; SUBCUTANEOUS EVERY 8 HOURS SCHEDULED
Status: DISCONTINUED | OUTPATIENT
Start: 2023-06-06 | End: 2023-06-11 | Stop reason: HOSPADM

## 2023-06-06 RX ORDER — FAMOTIDINE 10 MG
10 TABLET ORAL DAILY
COMMUNITY
End: 2023-06-30

## 2023-06-06 RX ORDER — SODIUM CHLORIDE 0.9 % (FLUSH) 0.9 %
5-40 SYRINGE (ML) INJECTION PRN
Status: DISCONTINUED | OUTPATIENT
Start: 2023-06-06 | End: 2023-06-11 | Stop reason: HOSPADM

## 2023-06-06 RX ORDER — SODIUM CHLORIDE 9 MG/ML
INJECTION, SOLUTION INTRAVENOUS PRN
Status: DISCONTINUED | OUTPATIENT
Start: 2023-06-06 | End: 2023-06-11 | Stop reason: HOSPADM

## 2023-06-06 RX ORDER — ONDANSETRON 2 MG/ML
4 INJECTION INTRAMUSCULAR; INTRAVENOUS EVERY 6 HOURS PRN
Status: DISCONTINUED | OUTPATIENT
Start: 2023-06-06 | End: 2023-06-11 | Stop reason: HOSPADM

## 2023-06-06 RX ORDER — ACETAMINOPHEN 650 MG/1
650 SUPPOSITORY RECTAL EVERY 6 HOURS PRN
Status: DISCONTINUED | OUTPATIENT
Start: 2023-06-06 | End: 2023-06-11 | Stop reason: HOSPADM

## 2023-06-06 RX ORDER — ONDANSETRON 4 MG/1
4 TABLET, ORALLY DISINTEGRATING ORAL EVERY 8 HOURS PRN
Status: DISCONTINUED | OUTPATIENT
Start: 2023-06-06 | End: 2023-06-11 | Stop reason: HOSPADM

## 2023-06-06 RX ORDER — ACETAMINOPHEN 325 MG/1
650 TABLET ORAL EVERY 6 HOURS PRN
Status: DISCONTINUED | OUTPATIENT
Start: 2023-06-06 | End: 2023-06-11 | Stop reason: HOSPADM

## 2023-06-06 RX ORDER — DEXTROSE MONOHYDRATE 100 MG/ML
INJECTION, SOLUTION INTRAVENOUS CONTINUOUS PRN
Status: DISCONTINUED | OUTPATIENT
Start: 2023-06-06 | End: 2023-06-11 | Stop reason: HOSPADM

## 2023-06-06 RX ORDER — SODIUM CHLORIDE 0.9 % (FLUSH) 0.9 %
5-40 SYRINGE (ML) INJECTION EVERY 12 HOURS SCHEDULED
Status: DISCONTINUED | OUTPATIENT
Start: 2023-06-06 | End: 2023-06-11 | Stop reason: HOSPADM

## 2023-06-06 RX ADMIN — SODIUM BICARBONATE: 84 INJECTION, SOLUTION INTRAVENOUS at 17:34

## 2023-06-06 RX ADMIN — HEPARIN SODIUM 5000 UNITS: 5000 INJECTION INTRAVENOUS; SUBCUTANEOUS at 20:23

## 2023-06-06 RX ADMIN — Medication 10 ML: at 20:28

## 2023-06-06 RX ADMIN — SODIUM ZIRCONIUM CYCLOSILICATE 5 G: 5 POWDER, FOR SUSPENSION ORAL at 20:23

## 2023-06-06 RX ADMIN — DEXTROSE MONOHYDRATE 250 ML: 100 INJECTION, SOLUTION INTRAVENOUS at 17:15

## 2023-06-06 RX ADMIN — SODIUM BICARBONATE 50 MEQ: 84 INJECTION, SOLUTION INTRAVENOUS at 17:12

## 2023-06-06 RX ADMIN — INSULIN HUMAN 10 UNITS: 100 INJECTION, SOLUTION PARENTERAL at 17:12

## 2023-06-06 RX ADMIN — SODIUM ZIRCONIUM CYCLOSILICATE 5 G: 5 POWDER, FOR SUSPENSION ORAL at 17:11

## 2023-06-06 ASSESSMENT — PAIN SCALES - GENERAL
PAINLEVEL_OUTOF10: 6
PAINLEVEL_OUTOF10: 6

## 2023-06-06 ASSESSMENT — ENCOUNTER SYMPTOMS
RHINORRHEA: 0
EYE PAIN: 0
NAUSEA: 0
BACK PAIN: 0
VOMITING: 0
CONSTIPATION: 0
SORE THROAT: 0
DIARRHEA: 0
COUGH: 0
ABDOMINAL PAIN: 0
SHORTNESS OF BREATH: 0

## 2023-06-06 ASSESSMENT — LIFESTYLE VARIABLES
HOW MANY STANDARD DRINKS CONTAINING ALCOHOL DO YOU HAVE ON A TYPICAL DAY: PATIENT DOES NOT DRINK
HOW OFTEN DO YOU HAVE A DRINK CONTAINING ALCOHOL: NEVER

## 2023-06-06 ASSESSMENT — PAIN DESCRIPTION - LOCATION: LOCATION: FLANK

## 2023-06-06 ASSESSMENT — PAIN - FUNCTIONAL ASSESSMENT: PAIN_FUNCTIONAL_ASSESSMENT: 0-10

## 2023-06-07 ENCOUNTER — APPOINTMENT (OUTPATIENT)
Dept: INTERVENTIONAL RADIOLOGY/VASCULAR | Age: 63
DRG: 674 | End: 2023-06-07
Payer: COMMERCIAL

## 2023-06-07 LAB
ALBUMIN SERPL-MCNC: 2.9 G/DL (ref 3.4–5)
ANION GAP SERPL CALCULATED.3IONS-SCNC: 7 MMOL/L (ref 3–16)
BASOPHILS # BLD: 0.1 K/UL (ref 0–0.2)
BASOPHILS NFR BLD: 0.7 %
BUN SERPL-MCNC: 43 MG/DL (ref 7–20)
CALCIUM SERPL-MCNC: 8.8 MG/DL (ref 8.3–10.6)
CHLORIDE SERPL-SCNC: 105 MMOL/L (ref 99–110)
CO2 SERPL-SCNC: 24 MMOL/L (ref 21–32)
CREAT SERPL-MCNC: 5.8 MG/DL (ref 0.8–1.3)
DEPRECATED RDW RBC AUTO: 14.2 % (ref 12.4–15.4)
EKG ATRIAL RATE: 52 BPM
EKG DIAGNOSIS: NORMAL
EKG P AXIS: 58 DEGREES
EKG P-R INTERVAL: 150 MS
EKG Q-T INTERVAL: 436 MS
EKG QRS DURATION: 72 MS
EKG QTC CALCULATION (BAZETT): 405 MS
EKG R AXIS: 39 DEGREES
EKG T AXIS: 52 DEGREES
EKG VENTRICULAR RATE: 52 BPM
EOSINOPHIL # BLD: 0 K/UL (ref 0–0.6)
EOSINOPHIL NFR BLD: 0.4 %
GFR SERPLBLD CREATININE-BSD FMLA CKD-EPI: 10 ML/MIN/{1.73_M2}
GLUCOSE BLD-MCNC: 126 MG/DL (ref 70–99)
GLUCOSE BLD-MCNC: 89 MG/DL (ref 70–99)
GLUCOSE BLD-MCNC: 97 MG/DL (ref 70–99)
GLUCOSE SERPL-MCNC: 96 MG/DL (ref 70–99)
HBV SURFACE AB SERPL IA-ACNC: <3.5 MIU/ML
HBV SURFACE AG SERPL QL IA: NORMAL
HCT VFR BLD AUTO: 31.6 % (ref 40.5–52.5)
HGB BLD-MCNC: 10.6 G/DL (ref 13.5–17.5)
LYMPHOCYTES # BLD: 0.7 K/UL (ref 1–5.1)
LYMPHOCYTES NFR BLD: 6.9 %
MAGNESIUM SERPL-MCNC: 2.2 MG/DL (ref 1.8–2.4)
MCH RBC QN AUTO: 31.9 PG (ref 26–34)
MCHC RBC AUTO-ENTMCNC: 33.4 G/DL (ref 31–36)
MCV RBC AUTO: 95.4 FL (ref 80–100)
MONOCYTES # BLD: 1.1 K/UL (ref 0–1.3)
MONOCYTES NFR BLD: 10.9 %
NEUTROPHILS # BLD: 8.1 K/UL (ref 1.7–7.7)
NEUTROPHILS NFR BLD: 81.1 %
PERFORMED ON: ABNORMAL
PERFORMED ON: NORMAL
PERFORMED ON: NORMAL
PHOSPHATE SERPL-MCNC: 3.9 MG/DL (ref 2.5–4.9)
PLATELET # BLD AUTO: 278 K/UL (ref 135–450)
PMV BLD AUTO: 7.3 FL (ref 5–10.5)
POTASSIUM SERPL-SCNC: 4.9 MMOL/L (ref 3.5–5.1)
RBC # BLD AUTO: 3.32 M/UL (ref 4.2–5.9)
SODIUM SERPL-SCNC: 136 MMOL/L (ref 136–145)
WBC # BLD AUTO: 10.1 K/UL (ref 4–11)

## 2023-06-07 PROCEDURE — 99153 MOD SED SAME PHYS/QHP EA: CPT

## 2023-06-07 PROCEDURE — 36558 INSERT TUNNELED CV CATH: CPT

## 2023-06-07 PROCEDURE — 77001 FLUOROGUIDE FOR VEIN DEVICE: CPT

## 2023-06-07 PROCEDURE — 6360000002 HC RX W HCPCS: Performed by: INTERNAL MEDICINE

## 2023-06-07 PROCEDURE — 76937 US GUIDE VASCULAR ACCESS: CPT

## 2023-06-07 PROCEDURE — 86706 HEP B SURFACE ANTIBODY: CPT

## 2023-06-07 PROCEDURE — 02H633Z INSERTION OF INFUSION DEVICE INTO RIGHT ATRIUM, PERCUTANEOUS APPROACH: ICD-10-PCS | Performed by: STUDENT IN AN ORGANIZED HEALTH CARE EDUCATION/TRAINING PROGRAM

## 2023-06-07 PROCEDURE — 99152 MOD SED SAME PHYS/QHP 5/>YRS: CPT

## 2023-06-07 PROCEDURE — 2500000003 HC RX 250 WO HCPCS: Performed by: INTERNAL MEDICINE

## 2023-06-07 PROCEDURE — 5A1D70Z PERFORMANCE OF URINARY FILTRATION, INTERMITTENT, LESS THAN 6 HOURS PER DAY: ICD-10-PCS | Performed by: STUDENT IN AN ORGANIZED HEALTH CARE EDUCATION/TRAINING PROGRAM

## 2023-06-07 PROCEDURE — 87340 HEPATITIS B SURFACE AG IA: CPT

## 2023-06-07 PROCEDURE — 6360000002 HC RX W HCPCS: Performed by: STUDENT IN AN ORGANIZED HEALTH CARE EDUCATION/TRAINING PROGRAM

## 2023-06-07 PROCEDURE — 97161 PT EVAL LOW COMPLEX 20 MIN: CPT

## 2023-06-07 PROCEDURE — C1894 INTRO/SHEATH, NON-LASER: HCPCS

## 2023-06-07 PROCEDURE — 93010 ELECTROCARDIOGRAM REPORT: CPT | Performed by: INTERNAL MEDICINE

## 2023-06-07 PROCEDURE — 2580000003 HC RX 258: Performed by: INTERNAL MEDICINE

## 2023-06-07 PROCEDURE — 97530 THERAPEUTIC ACTIVITIES: CPT

## 2023-06-07 PROCEDURE — 85025 COMPLETE CBC W/AUTO DIFF WBC: CPT

## 2023-06-07 PROCEDURE — 90935 HEMODIALYSIS ONE EVALUATION: CPT

## 2023-06-07 PROCEDURE — 2580000003 HC RX 258: Performed by: STUDENT IN AN ORGANIZED HEALTH CARE EDUCATION/TRAINING PROGRAM

## 2023-06-07 PROCEDURE — 97165 OT EVAL LOW COMPLEX 30 MIN: CPT

## 2023-06-07 PROCEDURE — 6370000000 HC RX 637 (ALT 250 FOR IP): Performed by: INTERNAL MEDICINE

## 2023-06-07 PROCEDURE — 2500000003 HC RX 250 WO HCPCS

## 2023-06-07 PROCEDURE — 80069 RENAL FUNCTION PANEL: CPT

## 2023-06-07 PROCEDURE — 1200000000 HC SEMI PRIVATE

## 2023-06-07 PROCEDURE — 0JH63XZ INSERTION OF TUNNELED VASCULAR ACCESS DEVICE INTO CHEST SUBCUTANEOUS TISSUE AND FASCIA, PERCUTANEOUS APPROACH: ICD-10-PCS | Performed by: STUDENT IN AN ORGANIZED HEALTH CARE EDUCATION/TRAINING PROGRAM

## 2023-06-07 PROCEDURE — 36415 COLL VENOUS BLD VENIPUNCTURE: CPT

## 2023-06-07 RX ORDER — HEPARIN SODIUM 1000 [USP'U]/ML
5800 INJECTION, SOLUTION INTRAVENOUS; SUBCUTANEOUS PRN
Status: DISCONTINUED | OUTPATIENT
Start: 2023-06-07 | End: 2023-06-11 | Stop reason: HOSPADM

## 2023-06-07 RX ORDER — FENTANYL CITRATE 50 UG/ML
INJECTION, SOLUTION INTRAMUSCULAR; INTRAVENOUS PRN
Status: COMPLETED | OUTPATIENT
Start: 2023-06-07 | End: 2023-06-07

## 2023-06-07 RX ORDER — LIDOCAINE HYDROCHLORIDE 10 MG/ML
10 INJECTION, SOLUTION EPIDURAL; INFILTRATION; INTRACAUDAL; PERINEURAL ONCE
Status: COMPLETED | OUTPATIENT
Start: 2023-06-07 | End: 2023-06-07

## 2023-06-07 RX ORDER — LIDOCAINE HYDROCHLORIDE AND EPINEPHRINE BITARTRATE 20; .01 MG/ML; MG/ML
INJECTION, SOLUTION SUBCUTANEOUS
Status: COMPLETED
Start: 2023-06-07 | End: 2023-06-07

## 2023-06-07 RX ORDER — AMLODIPINE BESYLATE 5 MG/1
5 TABLET ORAL DAILY
Status: DISCONTINUED | OUTPATIENT
Start: 2023-06-07 | End: 2023-06-11 | Stop reason: HOSPADM

## 2023-06-07 RX ORDER — MIDAZOLAM HYDROCHLORIDE 2 MG/2ML
INJECTION, SOLUTION INTRAMUSCULAR; INTRAVENOUS PRN
Status: COMPLETED | OUTPATIENT
Start: 2023-06-07 | End: 2023-06-07

## 2023-06-07 RX ORDER — HEPARIN SODIUM 1000 [USP'U]/ML
6000 INJECTION, SOLUTION INTRAVENOUS; SUBCUTANEOUS ONCE
Status: COMPLETED | OUTPATIENT
Start: 2023-06-07 | End: 2023-06-07

## 2023-06-07 RX ADMIN — MIDAZOLAM HYDROCHLORIDE 0.5 MG: 1 INJECTION, SOLUTION INTRAMUSCULAR; INTRAVENOUS at 10:49

## 2023-06-07 RX ADMIN — FENTANYL CITRATE 25 MCG: 50 INJECTION, SOLUTION INTRAMUSCULAR; INTRAVENOUS at 10:48

## 2023-06-07 RX ADMIN — HEPARIN SODIUM 5000 UNITS: 5000 INJECTION INTRAVENOUS; SUBCUTANEOUS at 18:18

## 2023-06-07 RX ADMIN — HEPARIN SODIUM 5800 UNITS: 1000 INJECTION INTRAVENOUS; SUBCUTANEOUS at 13:51

## 2023-06-07 RX ADMIN — ACETAMINOPHEN 650 MG: 325 TABLET ORAL at 18:19

## 2023-06-07 RX ADMIN — CEFAZOLIN 2000 MG: 1 INJECTION, POWDER, FOR SOLUTION INTRAVENOUS at 10:18

## 2023-06-07 RX ADMIN — Medication 10 ML: at 22:10

## 2023-06-07 RX ADMIN — ONDANSETRON 4 MG: 2 INJECTION INTRAMUSCULAR; INTRAVENOUS at 04:12

## 2023-06-07 RX ADMIN — MIDAZOLAM HYDROCHLORIDE 1 MG: 1 INJECTION, SOLUTION INTRAMUSCULAR; INTRAVENOUS at 10:19

## 2023-06-07 RX ADMIN — FENTANYL CITRATE 50 MCG: 50 INJECTION, SOLUTION INTRAMUSCULAR; INTRAVENOUS at 10:19

## 2023-06-07 RX ADMIN — HEPARIN SODIUM 5.8 UNITS/ML: 1000 INJECTION INTRAVENOUS; SUBCUTANEOUS at 11:25

## 2023-06-07 RX ADMIN — LIDOCAINE HYDROCHLORIDE,EPINEPHRINE BITARTRATE 10 ML: 20; .01 INJECTION, SOLUTION INFILTRATION; PERINEURAL at 11:24

## 2023-06-07 RX ADMIN — LIDOCAINE HYDROCHLORIDE 10 ML: 10 INJECTION, SOLUTION EPIDURAL; INFILTRATION; INTRACAUDAL; PERINEURAL at 11:28

## 2023-06-07 RX ADMIN — Medication: at 11:29

## 2023-06-07 RX ADMIN — HEPARIN SODIUM 5000 UNITS: 5000 INJECTION INTRAVENOUS; SUBCUTANEOUS at 22:10

## 2023-06-07 RX ADMIN — SODIUM BICARBONATE: 84 INJECTION, SOLUTION INTRAVENOUS at 03:59

## 2023-06-07 RX ADMIN — Medication 10 ML: at 09:00

## 2023-06-07 RX ADMIN — ACETAMINOPHEN 650 MG: 325 TABLET ORAL at 04:07

## 2023-06-07 ASSESSMENT — PAIN SCALES - GENERAL
PAINLEVEL_OUTOF10: 6
PAINLEVEL_OUTOF10: 0
PAINLEVEL_OUTOF10: 0
PAINLEVEL_OUTOF10: 7

## 2023-06-07 ASSESSMENT — PAIN SCALES - WONG BAKER
WONGBAKER_NUMERICALRESPONSE: 2

## 2023-06-07 ASSESSMENT — PAIN DESCRIPTION - ORIENTATION: ORIENTATION: LOWER

## 2023-06-07 ASSESSMENT — PAIN DESCRIPTION - LOCATION: LOCATION: BACK

## 2023-06-07 ASSESSMENT — PAIN DESCRIPTION - DESCRIPTORS: DESCRIPTORS: SHARP

## 2023-06-08 LAB
ALBUMIN SERPL-MCNC: 3.2 G/DL (ref 3.4–5)
ANION GAP SERPL CALCULATED.3IONS-SCNC: 9 MMOL/L (ref 3–16)
BASOPHILS # BLD: 0.1 K/UL (ref 0–0.2)
BASOPHILS NFR BLD: 0.9 %
BUN SERPL-MCNC: 36 MG/DL (ref 7–20)
CALCIUM SERPL-MCNC: 8.5 MG/DL (ref 8.3–10.6)
CHLORIDE SERPL-SCNC: 103 MMOL/L (ref 99–110)
CO2 SERPL-SCNC: 25 MMOL/L (ref 21–32)
CREAT SERPL-MCNC: 5.5 MG/DL (ref 0.8–1.3)
DEPRECATED RDW RBC AUTO: 14.4 % (ref 12.4–15.4)
EOSINOPHIL # BLD: 0.2 K/UL (ref 0–0.6)
EOSINOPHIL NFR BLD: 2.7 %
GFR SERPLBLD CREATININE-BSD FMLA CKD-EPI: 11 ML/MIN/{1.73_M2}
GLUCOSE BLD-MCNC: 109 MG/DL (ref 70–99)
GLUCOSE BLD-MCNC: 90 MG/DL (ref 70–99)
GLUCOSE BLD-MCNC: 90 MG/DL (ref 70–99)
GLUCOSE SERPL-MCNC: 97 MG/DL (ref 70–99)
HCT VFR BLD AUTO: 33.5 % (ref 40.5–52.5)
HGB BLD-MCNC: 11.2 G/DL (ref 13.5–17.5)
LYMPHOCYTES # BLD: 0.6 K/UL (ref 1–5.1)
LYMPHOCYTES NFR BLD: 7.7 %
MCH RBC QN AUTO: 31.9 PG (ref 26–34)
MCHC RBC AUTO-ENTMCNC: 33.3 G/DL (ref 31–36)
MCV RBC AUTO: 95.8 FL (ref 80–100)
MONOCYTES # BLD: 1 K/UL (ref 0–1.3)
MONOCYTES NFR BLD: 11.5 %
NEUTROPHILS # BLD: 6.6 K/UL (ref 1.7–7.7)
NEUTROPHILS NFR BLD: 77.2 %
PERFORMED ON: ABNORMAL
PERFORMED ON: NORMAL
PERFORMED ON: NORMAL
PHOSPHATE SERPL-MCNC: 4.3 MG/DL (ref 2.5–4.9)
PLATELET # BLD AUTO: 132 K/UL (ref 135–450)
PMV BLD AUTO: 7.1 FL (ref 5–10.5)
POTASSIUM SERPL-SCNC: 4.6 MMOL/L (ref 3.5–5.1)
RBC # BLD AUTO: 3.5 M/UL (ref 4.2–5.9)
SODIUM SERPL-SCNC: 137 MMOL/L (ref 136–145)
WBC # BLD AUTO: 8.5 K/UL (ref 4–11)

## 2023-06-08 PROCEDURE — 6370000000 HC RX 637 (ALT 250 FOR IP): Performed by: INTERNAL MEDICINE

## 2023-06-08 PROCEDURE — 1200000000 HC SEMI PRIVATE

## 2023-06-08 PROCEDURE — 80069 RENAL FUNCTION PANEL: CPT

## 2023-06-08 PROCEDURE — 6360000002 HC RX W HCPCS: Performed by: INTERNAL MEDICINE

## 2023-06-08 PROCEDURE — 90935 HEMODIALYSIS ONE EVALUATION: CPT

## 2023-06-08 PROCEDURE — 2580000003 HC RX 258: Performed by: INTERNAL MEDICINE

## 2023-06-08 PROCEDURE — 85025 COMPLETE CBC W/AUTO DIFF WBC: CPT

## 2023-06-08 PROCEDURE — 36415 COLL VENOUS BLD VENIPUNCTURE: CPT

## 2023-06-08 RX ADMIN — ACETAMINOPHEN 650 MG: 325 TABLET ORAL at 06:09

## 2023-06-08 RX ADMIN — HEPARIN SODIUM 5000 UNITS: 5000 INJECTION INTRAVENOUS; SUBCUTANEOUS at 06:08

## 2023-06-08 RX ADMIN — HEPARIN SODIUM 5800 UNITS: 1000 INJECTION INTRAVENOUS; SUBCUTANEOUS at 10:28

## 2023-06-08 RX ADMIN — AMLODIPINE BESYLATE 5 MG: 5 TABLET ORAL at 17:13

## 2023-06-08 RX ADMIN — Medication 10 ML: at 17:14

## 2023-06-08 RX ADMIN — Medication 10 ML: at 19:57

## 2023-06-08 RX ADMIN — ONDANSETRON 4 MG: 2 INJECTION INTRAMUSCULAR; INTRAVENOUS at 03:18

## 2023-06-08 ASSESSMENT — PAIN SCALES - WONG BAKER
WONGBAKER_NUMERICALRESPONSE: 0

## 2023-06-08 ASSESSMENT — PAIN SCALES - GENERAL
PAINLEVEL_OUTOF10: 0
PAINLEVEL_OUTOF10: 0
PAINLEVEL_OUTOF10: 6

## 2023-06-08 ASSESSMENT — PAIN DESCRIPTION - ORIENTATION: ORIENTATION: LOWER

## 2023-06-08 ASSESSMENT — PAIN DESCRIPTION - LOCATION: LOCATION: BACK

## 2023-06-08 ASSESSMENT — PAIN DESCRIPTION - DESCRIPTORS: DESCRIPTORS: SHOOTING

## 2023-06-09 LAB
ABO + RH BLD: NORMAL
ALBUMIN SERPL-MCNC: 2.8 G/DL (ref 3.4–5)
ANION GAP SERPL CALCULATED.3IONS-SCNC: 8 MMOL/L (ref 3–16)
BASOPHILS # BLD: 0.1 K/UL (ref 0–0.2)
BASOPHILS NFR BLD: 1.2 %
BLD GP AB SCN SERPL QL: NORMAL
BLOOD BANK DISPENSE STATUS: NORMAL
BLOOD BANK PRODUCT CODE: NORMAL
BPU ID: NORMAL
BUN SERPL-MCNC: 26 MG/DL (ref 7–20)
CALCIUM SERPL-MCNC: 8.3 MG/DL (ref 8.3–10.6)
CHLORIDE SERPL-SCNC: 103 MMOL/L (ref 99–110)
CO2 SERPL-SCNC: 27 MMOL/L (ref 21–32)
CREAT SERPL-MCNC: 5.2 MG/DL (ref 0.8–1.3)
DEPRECATED RDW RBC AUTO: 14 % (ref 12.4–15.4)
DEPRECATED RDW RBC AUTO: 14.1 % (ref 12.4–15.4)
DESCRIPTION BLOOD BANK: NORMAL
EOSINOPHIL # BLD: 0.2 K/UL (ref 0–0.6)
EOSINOPHIL NFR BLD: 2.7 %
FIBRINOGEN PPP-MCNC: 155 MG/DL (ref 243–550)
GFR SERPLBLD CREATININE-BSD FMLA CKD-EPI: 12 ML/MIN/{1.73_M2}
GLUCOSE BLD-MCNC: 89 MG/DL (ref 70–99)
GLUCOSE BLD-MCNC: 93 MG/DL (ref 70–99)
GLUCOSE SERPL-MCNC: 88 MG/DL (ref 70–99)
HCT VFR BLD AUTO: 25.1 % (ref 40.5–52.5)
HCT VFR BLD AUTO: 28.8 % (ref 40.5–52.5)
HGB BLD-MCNC: 8.2 G/DL (ref 13.5–17.5)
HGB BLD-MCNC: 9.7 G/DL (ref 13.5–17.5)
INR PPP: 1.23 (ref 0.84–1.16)
LYMPHOCYTES # BLD: 0.7 K/UL (ref 1–5.1)
LYMPHOCYTES NFR BLD: 10.6 %
MCH RBC QN AUTO: 31.3 PG (ref 26–34)
MCH RBC QN AUTO: 32 PG (ref 26–34)
MCHC RBC AUTO-ENTMCNC: 32.7 G/DL (ref 31–36)
MCHC RBC AUTO-ENTMCNC: 33.6 G/DL (ref 31–36)
MCV RBC AUTO: 95.5 FL (ref 80–100)
MCV RBC AUTO: 95.9 FL (ref 80–100)
MONOCYTES # BLD: 0.9 K/UL (ref 0–1.3)
MONOCYTES NFR BLD: 14.2 %
NEUTROPHILS # BLD: 4.8 K/UL (ref 1.7–7.7)
NEUTROPHILS NFR BLD: 71.3 %
PERFORMED ON: NORMAL
PERFORMED ON: NORMAL
PHOSPHATE SERPL-MCNC: 4.1 MG/DL (ref 2.5–4.9)
PLATELET # BLD AUTO: 32 K/UL (ref 135–450)
PLATELET # BLD AUTO: ABNORMAL K/UL (ref 135–450)
PLATELET BLD QL SMEAR: ABNORMAL
PLATELET BLD QL SMEAR: ABNORMAL
PMV BLD AUTO: 8 FL (ref 5–10.5)
PMV BLD AUTO: 8.5 FL (ref 5–10.5)
POTASSIUM SERPL-SCNC: 4.9 MMOL/L (ref 3.5–5.1)
PROTHROMBIN TIME: 15.5 SEC (ref 11.5–14.8)
RBC # BLD AUTO: 2.62 M/UL (ref 4.2–5.9)
RBC # BLD AUTO: 3.02 M/UL (ref 4.2–5.9)
SLIDE REVIEW: ABNORMAL
SODIUM SERPL-SCNC: 138 MMOL/L (ref 136–145)
WBC # BLD AUTO: 6.7 K/UL (ref 4–11)
WBC # BLD AUTO: 6.9 K/UL (ref 4–11)

## 2023-06-09 PROCEDURE — 86923 COMPATIBILITY TEST ELECTRIC: CPT

## 2023-06-09 PROCEDURE — 80069 RENAL FUNCTION PANEL: CPT

## 2023-06-09 PROCEDURE — 6360000002 HC RX W HCPCS: Performed by: INTERNAL MEDICINE

## 2023-06-09 PROCEDURE — 2580000003 HC RX 258: Performed by: INTERNAL MEDICINE

## 2023-06-09 PROCEDURE — 86900 BLOOD TYPING SEROLOGIC ABO: CPT

## 2023-06-09 PROCEDURE — 6370000000 HC RX 637 (ALT 250 FOR IP): Performed by: INTERNAL MEDICINE

## 2023-06-09 PROCEDURE — 86850 RBC ANTIBODY SCREEN: CPT

## 2023-06-09 PROCEDURE — P9035 PLATELET PHERES LEUKOREDUCED: HCPCS

## 2023-06-09 PROCEDURE — 90935 HEMODIALYSIS ONE EVALUATION: CPT

## 2023-06-09 PROCEDURE — 86901 BLOOD TYPING SEROLOGIC RH(D): CPT

## 2023-06-09 PROCEDURE — P9016 RBC LEUKOCYTES REDUCED: HCPCS

## 2023-06-09 PROCEDURE — 85384 FIBRINOGEN ACTIVITY: CPT

## 2023-06-09 PROCEDURE — 85027 COMPLETE CBC AUTOMATED: CPT

## 2023-06-09 PROCEDURE — 36430 TRANSFUSION BLD/BLD COMPNT: CPT

## 2023-06-09 PROCEDURE — 85025 COMPLETE CBC W/AUTO DIFF WBC: CPT

## 2023-06-09 PROCEDURE — 2000000000 HC ICU R&B

## 2023-06-09 PROCEDURE — P9012 CRYOPRECIPITATE EACH UNIT: HCPCS

## 2023-06-09 PROCEDURE — 85610 PROTHROMBIN TIME: CPT

## 2023-06-09 RX ORDER — PHYTONADIONE 5 MG/1
10 TABLET ORAL ONCE
Status: COMPLETED | OUTPATIENT
Start: 2023-06-09 | End: 2023-06-09

## 2023-06-09 RX ORDER — MORPHINE SULFATE 2 MG/ML
2 INJECTION, SOLUTION INTRAMUSCULAR; INTRAVENOUS EVERY 4 HOURS PRN
Status: DISCONTINUED | OUTPATIENT
Start: 2023-06-09 | End: 2023-06-11 | Stop reason: HOSPADM

## 2023-06-09 RX ORDER — SODIUM CHLORIDE 9 MG/ML
INJECTION, SOLUTION INTRAVENOUS PRN
Status: DISCONTINUED | OUTPATIENT
Start: 2023-06-09 | End: 2023-06-11 | Stop reason: HOSPADM

## 2023-06-09 RX ADMIN — ACETAMINOPHEN 650 MG: 325 TABLET ORAL at 10:03

## 2023-06-09 RX ADMIN — Medication 10 ML: at 10:04

## 2023-06-09 RX ADMIN — DESMOPRESSIN ACETATE 24.68 MCG: 4 INJECTION, SOLUTION INTRAVENOUS; SUBCUTANEOUS at 20:03

## 2023-06-09 RX ADMIN — AMLODIPINE BESYLATE 5 MG: 5 TABLET ORAL at 10:03

## 2023-06-09 RX ADMIN — PHYTONADIONE 10 MG: 5 TABLET ORAL at 22:02

## 2023-06-09 RX ADMIN — MORPHINE SULFATE 2 MG: 2 INJECTION, SOLUTION INTRAMUSCULAR; INTRAVENOUS at 18:09

## 2023-06-09 RX ADMIN — SODIUM CHLORIDE 10 ML/HR: 9 INJECTION, SOLUTION INTRAVENOUS at 20:03

## 2023-06-09 ASSESSMENT — PAIN DESCRIPTION - DESCRIPTORS
DESCRIPTORS: SHARP
DESCRIPTORS: SHARP

## 2023-06-09 ASSESSMENT — PAIN SCALES - GENERAL
PAINLEVEL_OUTOF10: 6
PAINLEVEL_OUTOF10: 7
PAINLEVEL_OUTOF10: 2

## 2023-06-09 ASSESSMENT — PAIN DESCRIPTION - ORIENTATION
ORIENTATION: LOWER
ORIENTATION: LOWER

## 2023-06-09 ASSESSMENT — PAIN DESCRIPTION - LOCATION
LOCATION: BACK

## 2023-06-10 LAB
ALBUMIN SERPL-MCNC: 3.1 G/DL (ref 3.4–5)
ANION GAP SERPL CALCULATED.3IONS-SCNC: 6 MMOL/L (ref 3–16)
BASOPHILS # BLD: 0.1 K/UL (ref 0–0.2)
BASOPHILS NFR BLD: 1 %
BLOOD BANK DISPENSE STATUS: NORMAL
BLOOD BANK DISPENSE STATUS: NORMAL
BLOOD BANK PRODUCT CODE: NORMAL
BLOOD BANK PRODUCT CODE: NORMAL
BPU ID: NORMAL
BPU ID: NORMAL
BUN SERPL-MCNC: 25 MG/DL (ref 7–20)
CALCIUM SERPL-MCNC: 9 MG/DL (ref 8.3–10.6)
CHLORIDE SERPL-SCNC: 102 MMOL/L (ref 99–110)
CO2 SERPL-SCNC: 29 MMOL/L (ref 21–32)
CREAT SERPL-MCNC: 4.8 MG/DL (ref 0.8–1.3)
DEPRECATED RDW RBC AUTO: 14.1 % (ref 12.4–15.4)
DESCRIPTION BLOOD BANK: NORMAL
DESCRIPTION BLOOD BANK: NORMAL
EOSINOPHIL # BLD: 0.1 K/UL (ref 0–0.6)
EOSINOPHIL NFR BLD: 1.6 %
FIBRINOGEN PPP-MCNC: 244 MG/DL (ref 243–550)
GFR SERPLBLD CREATININE-BSD FMLA CKD-EPI: 13 ML/MIN/{1.73_M2}
GLUCOSE SERPL-MCNC: 95 MG/DL (ref 70–99)
HCT VFR BLD AUTO: 17.7 % (ref 40.5–52.5)
HCT VFR BLD AUTO: 19.6 % (ref 40.5–52.5)
HCT VFR BLD AUTO: 28.1 % (ref 40.5–52.5)
HCT VFR BLD AUTO: 28.7 % (ref 40.5–52.5)
HGB BLD-MCNC: 5.8 G/DL (ref 13.5–17.5)
HGB BLD-MCNC: 6.5 G/DL (ref 13.5–17.5)
HGB BLD-MCNC: 9.4 G/DL (ref 13.5–17.5)
HGB BLD-MCNC: 9.5 G/DL (ref 13.5–17.5)
LYMPHOCYTES # BLD: 0.6 K/UL (ref 1–5.1)
LYMPHOCYTES NFR BLD: 9.7 %
MCH RBC QN AUTO: 31.9 PG (ref 26–34)
MCHC RBC AUTO-ENTMCNC: 33.4 G/DL (ref 31–36)
MCV RBC AUTO: 95.5 FL (ref 80–100)
MONOCYTES # BLD: 1 K/UL (ref 0–1.3)
MONOCYTES NFR BLD: 15.7 %
NEUTROPHILS # BLD: 4.8 K/UL (ref 1.7–7.7)
NEUTROPHILS NFR BLD: 72 %
PHOSPHATE SERPL-MCNC: 4.1 MG/DL (ref 2.5–4.9)
PLATELET # BLD AUTO: 193 K/UL (ref 135–450)
PMV BLD AUTO: 8.1 FL (ref 5–10.5)
POTASSIUM SERPL-SCNC: 4.5 MMOL/L (ref 3.5–5.1)
RBC # BLD AUTO: 2.05 M/UL (ref 4.2–5.9)
SODIUM SERPL-SCNC: 137 MMOL/L (ref 136–145)
WBC # BLD AUTO: 6.7 K/UL (ref 4–11)

## 2023-06-10 PROCEDURE — 36430 TRANSFUSION BLD/BLD COMPNT: CPT

## 2023-06-10 PROCEDURE — 80069 RENAL FUNCTION PANEL: CPT

## 2023-06-10 PROCEDURE — 85025 COMPLETE CBC W/AUTO DIFF WBC: CPT

## 2023-06-10 PROCEDURE — 2000000000 HC ICU R&B

## 2023-06-10 PROCEDURE — 85014 HEMATOCRIT: CPT

## 2023-06-10 PROCEDURE — 6370000000 HC RX 637 (ALT 250 FOR IP): Performed by: INTERNAL MEDICINE

## 2023-06-10 PROCEDURE — 36415 COLL VENOUS BLD VENIPUNCTURE: CPT

## 2023-06-10 PROCEDURE — 85018 HEMOGLOBIN: CPT

## 2023-06-10 PROCEDURE — 85384 FIBRINOGEN ACTIVITY: CPT

## 2023-06-10 PROCEDURE — 2580000003 HC RX 258: Performed by: INTERNAL MEDICINE

## 2023-06-10 PROCEDURE — 6360000002 HC RX W HCPCS: Performed by: INTERNAL MEDICINE

## 2023-06-10 PROCEDURE — 30233N1 TRANSFUSION OF NONAUTOLOGOUS RED BLOOD CELLS INTO PERIPHERAL VEIN, PERCUTANEOUS APPROACH: ICD-10-PCS | Performed by: INTERNAL MEDICINE

## 2023-06-10 RX ORDER — SODIUM CHLORIDE 9 MG/ML
INJECTION, SOLUTION INTRAVENOUS PRN
Status: DISCONTINUED | OUTPATIENT
Start: 2023-06-10 | End: 2023-06-11 | Stop reason: HOSPADM

## 2023-06-10 RX ADMIN — Medication 5 ML: at 08:55

## 2023-06-10 RX ADMIN — AMLODIPINE BESYLATE 5 MG: 5 TABLET ORAL at 08:55

## 2023-06-10 RX ADMIN — MORPHINE SULFATE 2 MG: 2 INJECTION, SOLUTION INTRAMUSCULAR; INTRAVENOUS at 23:48

## 2023-06-10 RX ADMIN — ACETAMINOPHEN 650 MG: 325 TABLET ORAL at 04:37

## 2023-06-10 ASSESSMENT — PAIN SCALES - GENERAL
PAINLEVEL_OUTOF10: 6
PAINLEVEL_OUTOF10: 2
PAINLEVEL_OUTOF10: 2
PAINLEVEL_OUTOF10: 6

## 2023-06-10 ASSESSMENT — PAIN DESCRIPTION - LOCATION
LOCATION: BACK

## 2023-06-11 VITALS
HEART RATE: 60 BPM | RESPIRATION RATE: 18 BRPM | SYSTOLIC BLOOD PRESSURE: 157 MMHG | WEIGHT: 124.78 LBS | TEMPERATURE: 98.7 F | BODY MASS INDEX: 17.4 KG/M2 | OXYGEN SATURATION: 93 % | DIASTOLIC BLOOD PRESSURE: 72 MMHG

## 2023-06-11 LAB
ALBUMIN SERPL-MCNC: 2.9 G/DL (ref 3.4–5)
ANION GAP SERPL CALCULATED.3IONS-SCNC: 8 MMOL/L (ref 3–16)
BASOPHILS # BLD: 0.1 K/UL (ref 0–0.2)
BASOPHILS NFR BLD: 1 %
BUN SERPL-MCNC: 33 MG/DL (ref 7–20)
CALCIUM SERPL-MCNC: 8.6 MG/DL (ref 8.3–10.6)
CHLORIDE SERPL-SCNC: 102 MMOL/L (ref 99–110)
CO2 SERPL-SCNC: 27 MMOL/L (ref 21–32)
CREAT SERPL-MCNC: 6.6 MG/DL (ref 0.8–1.3)
DEPRECATED RDW RBC AUTO: 17.1 % (ref 12.4–15.4)
EOSINOPHIL # BLD: 0.2 K/UL (ref 0–0.6)
EOSINOPHIL NFR BLD: 2 %
GFR SERPLBLD CREATININE-BSD FMLA CKD-EPI: 9 ML/MIN/{1.73_M2}
GLUCOSE SERPL-MCNC: 86 MG/DL (ref 70–99)
HCT VFR BLD AUTO: 27.8 % (ref 40.5–52.5)
HCT VFR BLD AUTO: 28 % (ref 40.5–52.5)
HGB BLD-MCNC: 9.3 G/DL (ref 13.5–17.5)
HGB BLD-MCNC: 9.5 G/DL (ref 13.5–17.5)
LYMPHOCYTES # BLD: 0.6 K/UL (ref 1–5.1)
LYMPHOCYTES NFR BLD: 8.7 %
MCH RBC QN AUTO: 30.8 PG (ref 26–34)
MCHC RBC AUTO-ENTMCNC: 34.1 G/DL (ref 31–36)
MCV RBC AUTO: 90.2 FL (ref 80–100)
MONOCYTES # BLD: 1.1 K/UL (ref 0–1.3)
MONOCYTES NFR BLD: 15.3 %
NEUTROPHILS # BLD: 5.4 K/UL (ref 1.7–7.7)
NEUTROPHILS NFR BLD: 73 %
PHOSPHATE SERPL-MCNC: 4.5 MG/DL (ref 2.5–4.9)
PLATELET # BLD AUTO: 165 K/UL (ref 135–450)
PMV BLD AUTO: 8 FL (ref 5–10.5)
POTASSIUM SERPL-SCNC: 4.5 MMOL/L (ref 3.5–5.1)
RBC # BLD AUTO: 3.1 M/UL (ref 4.2–5.9)
SODIUM SERPL-SCNC: 137 MMOL/L (ref 136–145)
WBC # BLD AUTO: 7.4 K/UL (ref 4–11)

## 2023-06-11 PROCEDURE — 85014 HEMATOCRIT: CPT

## 2023-06-11 PROCEDURE — 2580000003 HC RX 258: Performed by: INTERNAL MEDICINE

## 2023-06-11 PROCEDURE — 80069 RENAL FUNCTION PANEL: CPT

## 2023-06-11 PROCEDURE — 6370000000 HC RX 637 (ALT 250 FOR IP): Performed by: INTERNAL MEDICINE

## 2023-06-11 PROCEDURE — 51798 US URINE CAPACITY MEASURE: CPT

## 2023-06-11 PROCEDURE — 85025 COMPLETE CBC W/AUTO DIFF WBC: CPT

## 2023-06-11 PROCEDURE — 85018 HEMOGLOBIN: CPT

## 2023-06-11 RX ORDER — AMLODIPINE BESYLATE 5 MG/1
5 TABLET ORAL DAILY
Qty: 30 TABLET | Refills: 3 | Status: SHIPPED | OUTPATIENT
Start: 2023-06-12

## 2023-06-11 RX ADMIN — Medication 10 ML: at 09:02

## 2023-06-11 RX ADMIN — AMLODIPINE BESYLATE 5 MG: 5 TABLET ORAL at 09:02

## 2023-06-11 ASSESSMENT — PAIN SCALES - GENERAL: PAINLEVEL_OUTOF10: 2

## 2023-06-11 ASSESSMENT — PAIN DESCRIPTION - LOCATION: LOCATION: BACK

## 2023-06-30 ENCOUNTER — ANESTHESIA (OUTPATIENT)
Dept: OPERATING ROOM | Age: 63
End: 2023-06-30
Payer: MEDICARE

## 2023-06-30 ENCOUNTER — HOSPITAL ENCOUNTER (INPATIENT)
Age: 63
LOS: 1 days | Discharge: HOME | DRG: 660 | End: 2023-07-01
Attending: INTERNAL MEDICINE | Admitting: INTERNAL MEDICINE
Payer: COMMERCIAL

## 2023-06-30 ENCOUNTER — APPOINTMENT (OUTPATIENT)
Dept: CT IMAGING | Age: 63
DRG: 660 | End: 2023-06-30
Payer: COMMERCIAL

## 2023-06-30 ENCOUNTER — APPOINTMENT (OUTPATIENT)
Dept: GENERAL RADIOLOGY | Age: 63
DRG: 660 | End: 2023-06-30
Payer: COMMERCIAL

## 2023-06-30 ENCOUNTER — ANESTHESIA EVENT (OUTPATIENT)
Dept: OPERATING ROOM | Age: 63
End: 2023-06-30
Payer: MEDICARE

## 2023-06-30 DIAGNOSIS — N18.6 ESRD NEEDING DIALYSIS (HCC): ICD-10-CM

## 2023-06-30 DIAGNOSIS — N13.30 HYDRONEPHROSIS, UNSPECIFIED HYDRONEPHROSIS TYPE: ICD-10-CM

## 2023-06-30 DIAGNOSIS — Z99.2 ESRD NEEDING DIALYSIS (HCC): ICD-10-CM

## 2023-06-30 DIAGNOSIS — R33.9 RETENTION OF URINE: Primary | ICD-10-CM

## 2023-06-30 PROBLEM — R31.9 HEMATURIA: Status: ACTIVE | Noted: 2023-06-30

## 2023-06-30 LAB
ALBUMIN SERPL-MCNC: 3.2 G/DL (ref 3.4–5)
ALBUMIN/GLOB SERPL: 1 {RATIO} (ref 1.1–2.2)
ALP SERPL-CCNC: 72 U/L (ref 40–129)
ALT SERPL-CCNC: <5 U/L (ref 10–40)
ANION GAP SERPL CALCULATED.3IONS-SCNC: 11 MMOL/L (ref 3–16)
ANION GAP SERPL CALCULATED.3IONS-SCNC: 14 MMOL/L (ref 3–16)
AST SERPL-CCNC: 15 U/L (ref 15–37)
BASOPHILS # BLD: 0.1 K/UL (ref 0–0.2)
BASOPHILS # BLD: 0.1 K/UL (ref 0–0.2)
BASOPHILS NFR BLD: 1 %
BASOPHILS NFR BLD: 1.1 %
BILIRUB SERPL-MCNC: 0.3 MG/DL (ref 0–1)
BUN SERPL-MCNC: 16 MG/DL (ref 7–20)
BUN SERPL-MCNC: 17 MG/DL (ref 7–20)
CALCIUM SERPL-MCNC: 8.4 MG/DL (ref 8.3–10.6)
CALCIUM SERPL-MCNC: 8.5 MG/DL (ref 8.3–10.6)
CHLORIDE SERPL-SCNC: 96 MMOL/L (ref 99–110)
CHLORIDE SERPL-SCNC: 98 MMOL/L (ref 99–110)
CO2 SERPL-SCNC: 26 MMOL/L (ref 21–32)
CO2 SERPL-SCNC: 28 MMOL/L (ref 21–32)
CREAT SERPL-MCNC: 6.7 MG/DL (ref 0.8–1.3)
CREAT SERPL-MCNC: 7.2 MG/DL (ref 0.8–1.3)
DEPRECATED RDW RBC AUTO: 16 % (ref 12.4–15.4)
DEPRECATED RDW RBC AUTO: 16.8 % (ref 12.4–15.4)
EOSINOPHIL # BLD: 0.1 K/UL (ref 0–0.6)
EOSINOPHIL # BLD: 0.1 K/UL (ref 0–0.6)
EOSINOPHIL NFR BLD: 2.1 %
EOSINOPHIL NFR BLD: 2.2 %
GFR SERPLBLD CREATININE-BSD FMLA CKD-EPI: 8 ML/MIN/{1.73_M2}
GFR SERPLBLD CREATININE-BSD FMLA CKD-EPI: 9 ML/MIN/{1.73_M2}
GLUCOSE SERPL-MCNC: 78 MG/DL (ref 70–99)
GLUCOSE SERPL-MCNC: 80 MG/DL (ref 70–99)
HCT VFR BLD AUTO: 25 % (ref 40.5–52.5)
HCT VFR BLD AUTO: 27.2 % (ref 40.5–52.5)
HGB BLD-MCNC: 8.2 G/DL (ref 13.5–17.5)
HGB BLD-MCNC: 9.1 G/DL (ref 13.5–17.5)
LYMPHOCYTES # BLD: 0.7 K/UL (ref 1–5.1)
LYMPHOCYTES # BLD: 0.9 K/UL (ref 1–5.1)
LYMPHOCYTES NFR BLD: 11.6 %
LYMPHOCYTES NFR BLD: 14.1 %
MCH RBC QN AUTO: 30.3 PG (ref 26–34)
MCH RBC QN AUTO: 30.6 PG (ref 26–34)
MCHC RBC AUTO-ENTMCNC: 32.6 G/DL (ref 31–36)
MCHC RBC AUTO-ENTMCNC: 33.4 G/DL (ref 31–36)
MCV RBC AUTO: 91.7 FL (ref 80–100)
MCV RBC AUTO: 92.9 FL (ref 80–100)
MONOCYTES # BLD: 1 K/UL (ref 0–1.3)
MONOCYTES # BLD: 1.1 K/UL (ref 0–1.3)
MONOCYTES NFR BLD: 17.5 %
MONOCYTES NFR BLD: 18.2 %
NEUTROPHILS # BLD: 3.8 K/UL (ref 1.7–7.7)
NEUTROPHILS # BLD: 3.9 K/UL (ref 1.7–7.7)
NEUTROPHILS NFR BLD: 65.2 %
NEUTROPHILS NFR BLD: 67 %
PLATELET # BLD AUTO: 65 K/UL (ref 135–450)
PLATELET # BLD AUTO: 68 K/UL (ref 135–450)
PLATELET BLD QL SMEAR: ABNORMAL
PMV BLD AUTO: 7.7 FL (ref 5–10.5)
PMV BLD AUTO: 8 FL (ref 5–10.5)
POTASSIUM SERPL-SCNC: 4.8 MMOL/L (ref 3.5–5.1)
POTASSIUM SERPL-SCNC: 4.9 MMOL/L (ref 3.5–5.1)
PROT SERPL-MCNC: 6.3 G/DL (ref 6.4–8.2)
RBC # BLD AUTO: 2.69 M/UL (ref 4.2–5.9)
RBC # BLD AUTO: 2.96 M/UL (ref 4.2–5.9)
SLIDE REVIEW: ABNORMAL
SODIUM SERPL-SCNC: 136 MMOL/L (ref 136–145)
SODIUM SERPL-SCNC: 137 MMOL/L (ref 136–145)
WBC # BLD AUTO: 5.7 K/UL (ref 4–11)
WBC # BLD AUTO: 6 K/UL (ref 4–11)

## 2023-06-30 PROCEDURE — 74420 UROGRAPHY RTRGR +-KUB: CPT

## 2023-06-30 PROCEDURE — BT1F1ZZ FLUOROSCOPY OF LEFT KIDNEY, URETER AND BLADDER USING LOW OSMOLAR CONTRAST: ICD-10-PCS | Performed by: UROLOGY

## 2023-06-30 PROCEDURE — 6360000002 HC RX W HCPCS: Performed by: INTERNAL MEDICINE

## 2023-06-30 PROCEDURE — 74176 CT ABD & PELVIS W/O CONTRAST: CPT

## 2023-06-30 PROCEDURE — 2580000003 HC RX 258: Performed by: UROLOGY

## 2023-06-30 PROCEDURE — C2617 STENT, NON-COR, TEM W/O DEL: HCPCS | Performed by: UROLOGY

## 2023-06-30 PROCEDURE — 1200000000 HC SEMI PRIVATE

## 2023-06-30 PROCEDURE — 80053 COMPREHEN METABOLIC PANEL: CPT

## 2023-06-30 PROCEDURE — 51798 US URINE CAPACITY MEASURE: CPT

## 2023-06-30 PROCEDURE — 2580000003 HC RX 258: Performed by: NURSE ANESTHETIST, CERTIFIED REGISTERED

## 2023-06-30 PROCEDURE — 2500000003 HC RX 250 WO HCPCS: Performed by: NURSE ANESTHETIST, CERTIFIED REGISTERED

## 2023-06-30 PROCEDURE — 7100000000 HC PACU RECOVERY - FIRST 15 MIN: Performed by: UROLOGY

## 2023-06-30 PROCEDURE — 3600000014 HC SURGERY LEVEL 4 ADDTL 15MIN: Performed by: UROLOGY

## 2023-06-30 PROCEDURE — 7100000001 HC PACU RECOVERY - ADDTL 15 MIN: Performed by: UROLOGY

## 2023-06-30 PROCEDURE — 3700000001 HC ADD 15 MINUTES (ANESTHESIA): Performed by: UROLOGY

## 2023-06-30 PROCEDURE — 6360000004 HC RX CONTRAST MEDICATION: Performed by: UROLOGY

## 2023-06-30 PROCEDURE — 3600000004 HC SURGERY LEVEL 4 BASE: Performed by: UROLOGY

## 2023-06-30 PROCEDURE — C1769 GUIDE WIRE: HCPCS | Performed by: UROLOGY

## 2023-06-30 PROCEDURE — 99285 EMERGENCY DEPT VISIT HI MDM: CPT

## 2023-06-30 PROCEDURE — 6360000002 HC RX W HCPCS: Performed by: NURSE ANESTHETIST, CERTIFIED REGISTERED

## 2023-06-30 PROCEDURE — 3700000000 HC ANESTHESIA ATTENDED CARE: Performed by: UROLOGY

## 2023-06-30 PROCEDURE — 6370000000 HC RX 637 (ALT 250 FOR IP): Performed by: UROLOGY

## 2023-06-30 PROCEDURE — 0T778DZ DILATION OF LEFT URETER WITH INTRALUMINAL DEVICE, VIA NATURAL OR ARTIFICIAL OPENING ENDOSCOPIC: ICD-10-PCS | Performed by: UROLOGY

## 2023-06-30 PROCEDURE — 36415 COLL VENOUS BLD VENIPUNCTURE: CPT

## 2023-06-30 PROCEDURE — 85025 COMPLETE CBC W/AUTO DIFF WBC: CPT

## 2023-06-30 PROCEDURE — 2709999900 HC NON-CHARGEABLE SUPPLY: Performed by: UROLOGY

## 2023-06-30 DEVICE — URETERAL STENT
Type: IMPLANTABLE DEVICE | Site: URETER | Status: FUNCTIONAL
Brand: CONTOUR™

## 2023-06-30 RX ORDER — PROPOFOL 10 MG/ML
INJECTION, EMULSION INTRAVENOUS PRN
Status: DISCONTINUED | OUTPATIENT
Start: 2023-06-30 | End: 2023-06-30 | Stop reason: SDUPTHER

## 2023-06-30 RX ORDER — MAGNESIUM HYDROXIDE 1200 MG/15ML
LIQUID ORAL
Status: COMPLETED | OUTPATIENT
Start: 2023-06-30 | End: 2023-06-30

## 2023-06-30 RX ORDER — HEPARIN SODIUM 5000 [USP'U]/ML
5000 INJECTION, SOLUTION INTRAVENOUS; SUBCUTANEOUS EVERY 8 HOURS SCHEDULED
Status: DISCONTINUED | OUTPATIENT
Start: 2023-06-30 | End: 2023-07-01 | Stop reason: HOSPADM

## 2023-06-30 RX ORDER — SODIUM CHLORIDE 9 MG/ML
INJECTION, SOLUTION INTRAVENOUS CONTINUOUS PRN
Status: DISCONTINUED | OUTPATIENT
Start: 2023-06-30 | End: 2023-06-30 | Stop reason: SDUPTHER

## 2023-06-30 RX ORDER — DEXAMETHASONE SODIUM PHOSPHATE 4 MG/ML
INJECTION, SOLUTION INTRA-ARTICULAR; INTRALESIONAL; INTRAMUSCULAR; INTRAVENOUS; SOFT TISSUE PRN
Status: DISCONTINUED | OUTPATIENT
Start: 2023-06-30 | End: 2023-06-30 | Stop reason: SDUPTHER

## 2023-06-30 RX ORDER — ONDANSETRON 8 MG/1
8 TABLET, ORALLY DISINTEGRATING ORAL EVERY 8 HOURS PRN
COMMUNITY

## 2023-06-30 RX ORDER — SODIUM CHLORIDE 9 MG/ML
INJECTION, SOLUTION INTRAVENOUS PRN
Status: DISCONTINUED | OUTPATIENT
Start: 2023-06-30 | End: 2023-07-01 | Stop reason: HOSPADM

## 2023-06-30 RX ORDER — ACETAMINOPHEN 325 MG/1
650 TABLET ORAL EVERY 6 HOURS PRN
Status: DISCONTINUED | OUTPATIENT
Start: 2023-06-30 | End: 2023-07-01 | Stop reason: HOSPADM

## 2023-06-30 RX ORDER — SUCCINYLCHOLINE/SOD CL,ISO/PF 200MG/10ML
SYRINGE (ML) INTRAVENOUS PRN
Status: DISCONTINUED | OUTPATIENT
Start: 2023-06-30 | End: 2023-06-30 | Stop reason: SDUPTHER

## 2023-06-30 RX ORDER — POLYETHYLENE GLYCOL 3350 17 G/17G
17 POWDER, FOR SOLUTION ORAL DAILY PRN
Status: DISCONTINUED | OUTPATIENT
Start: 2023-06-30 | End: 2023-07-01 | Stop reason: HOSPADM

## 2023-06-30 RX ORDER — DIPHENOXYLATE HYDROCHLORIDE AND ATROPINE SULFATE 2.5; .025 MG/1; MG/1
2 TABLET ORAL 4 TIMES DAILY PRN
Status: DISCONTINUED | OUTPATIENT
Start: 2023-06-30 | End: 2023-07-01 | Stop reason: HOSPADM

## 2023-06-30 RX ORDER — TAMSULOSIN HYDROCHLORIDE 0.4 MG/1
0.4 CAPSULE ORAL DAILY
Status: DISCONTINUED | OUTPATIENT
Start: 2023-06-30 | End: 2023-07-01 | Stop reason: HOSPADM

## 2023-06-30 RX ORDER — SODIUM CHLORIDE 0.9 % (FLUSH) 0.9 %
5-40 SYRINGE (ML) INJECTION EVERY 12 HOURS SCHEDULED
Status: DISCONTINUED | OUTPATIENT
Start: 2023-06-30 | End: 2023-06-30 | Stop reason: HOSPADM

## 2023-06-30 RX ORDER — HYDROMORPHONE HYDROCHLORIDE 1 MG/ML
0.25 INJECTION, SOLUTION INTRAMUSCULAR; INTRAVENOUS; SUBCUTANEOUS EVERY 4 HOURS PRN
Status: DISCONTINUED | OUTPATIENT
Start: 2023-06-30 | End: 2023-07-01 | Stop reason: HOSPADM

## 2023-06-30 RX ORDER — FENTANYL CITRATE 50 UG/ML
INJECTION, SOLUTION INTRAMUSCULAR; INTRAVENOUS PRN
Status: DISCONTINUED | OUTPATIENT
Start: 2023-06-30 | End: 2023-06-30 | Stop reason: SDUPTHER

## 2023-06-30 RX ORDER — ONDANSETRON 2 MG/ML
INJECTION INTRAMUSCULAR; INTRAVENOUS PRN
Status: DISCONTINUED | OUTPATIENT
Start: 2023-06-30 | End: 2023-06-30 | Stop reason: SDUPTHER

## 2023-06-30 RX ORDER — ONDANSETRON 2 MG/ML
4 INJECTION INTRAMUSCULAR; INTRAVENOUS EVERY 6 HOURS PRN
Status: DISCONTINUED | OUTPATIENT
Start: 2023-06-30 | End: 2023-07-01 | Stop reason: HOSPADM

## 2023-06-30 RX ORDER — AMLODIPINE BESYLATE 5 MG/1
5 TABLET ORAL DAILY
Status: DISCONTINUED | OUTPATIENT
Start: 2023-07-01 | End: 2023-07-01 | Stop reason: HOSPADM

## 2023-06-30 RX ORDER — LIDOCAINE HYDROCHLORIDE 20 MG/ML
INJECTION, SOLUTION EPIDURAL; INFILTRATION; INTRACAUDAL; PERINEURAL PRN
Status: DISCONTINUED | OUTPATIENT
Start: 2023-06-30 | End: 2023-06-30 | Stop reason: SDUPTHER

## 2023-06-30 RX ORDER — SODIUM CHLORIDE 0.9 % (FLUSH) 0.9 %
5-40 SYRINGE (ML) INJECTION PRN
Status: DISCONTINUED | OUTPATIENT
Start: 2023-06-30 | End: 2023-06-30 | Stop reason: HOSPADM

## 2023-06-30 RX ORDER — CEFAZOLIN SODIUM 1 G/3ML
INJECTION, POWDER, FOR SOLUTION INTRAMUSCULAR; INTRAVENOUS PRN
Status: DISCONTINUED | OUTPATIENT
Start: 2023-06-30 | End: 2023-06-30 | Stop reason: SDUPTHER

## 2023-06-30 RX ORDER — ONDANSETRON 2 MG/ML
4 INJECTION INTRAMUSCULAR; INTRAVENOUS
Status: DISCONTINUED | OUTPATIENT
Start: 2023-06-30 | End: 2023-06-30 | Stop reason: HOSPADM

## 2023-06-30 RX ORDER — SODIUM CHLORIDE 0.9 % (FLUSH) 0.9 %
5-40 SYRINGE (ML) INJECTION PRN
Status: DISCONTINUED | OUTPATIENT
Start: 2023-06-30 | End: 2023-07-01 | Stop reason: HOSPADM

## 2023-06-30 RX ORDER — SODIUM CHLORIDE 0.9 % (FLUSH) 0.9 %
5-40 SYRINGE (ML) INJECTION EVERY 12 HOURS SCHEDULED
Status: DISCONTINUED | OUTPATIENT
Start: 2023-06-30 | End: 2023-07-01 | Stop reason: HOSPADM

## 2023-06-30 RX ORDER — SODIUM CHLORIDE 9 MG/ML
INJECTION, SOLUTION INTRAVENOUS PRN
Status: DISCONTINUED | OUTPATIENT
Start: 2023-06-30 | End: 2023-06-30 | Stop reason: HOSPADM

## 2023-06-30 RX ORDER — HYDROMORPHONE HCL 110MG/55ML
0.5 PATIENT CONTROLLED ANALGESIA SYRINGE INTRAVENOUS EVERY 5 MIN PRN
Status: DISCONTINUED | OUTPATIENT
Start: 2023-06-30 | End: 2023-06-30 | Stop reason: HOSPADM

## 2023-06-30 RX ORDER — ONDANSETRON 4 MG/1
4 TABLET, ORALLY DISINTEGRATING ORAL EVERY 8 HOURS PRN
Status: DISCONTINUED | OUTPATIENT
Start: 2023-06-30 | End: 2023-07-01 | Stop reason: HOSPADM

## 2023-06-30 RX ORDER — ACETAMINOPHEN 650 MG/1
650 SUPPOSITORY RECTAL EVERY 6 HOURS PRN
Status: DISCONTINUED | OUTPATIENT
Start: 2023-06-30 | End: 2023-07-01 | Stop reason: HOSPADM

## 2023-06-30 RX ORDER — SODIUM BICARBONATE 650 MG/1
1300 TABLET ORAL 2 TIMES DAILY
Status: DISCONTINUED | OUTPATIENT
Start: 2023-06-30 | End: 2023-06-30

## 2023-06-30 RX ADMIN — PROPOFOL 150 MG: 10 INJECTION, EMULSION INTRAVENOUS at 16:11

## 2023-06-30 RX ADMIN — Medication 100 MG: at 16:10

## 2023-06-30 RX ADMIN — DEXAMETHASONE SODIUM PHOSPHATE 8 MG: 4 INJECTION, SOLUTION INTRAMUSCULAR; INTRAVENOUS at 16:24

## 2023-06-30 RX ADMIN — ONDANSETRON 4 MG: 2 INJECTION INTRAMUSCULAR; INTRAVENOUS at 16:08

## 2023-06-30 RX ADMIN — SODIUM CHLORIDE: 9 INJECTION, SOLUTION INTRAVENOUS at 15:57

## 2023-06-30 RX ADMIN — HYDROMORPHONE HYDROCHLORIDE 0.25 MG: 1 INJECTION, SOLUTION INTRAMUSCULAR; INTRAVENOUS; SUBCUTANEOUS at 23:36

## 2023-06-30 RX ADMIN — LIDOCAINE HYDROCHLORIDE 60 MG: 20 INJECTION, SOLUTION EPIDURAL; INFILTRATION; INTRACAUDAL; PERINEURAL at 16:11

## 2023-06-30 RX ADMIN — CEFAZOLIN 2 G: 1 INJECTION, POWDER, FOR SOLUTION INTRAVENOUS at 15:57

## 2023-06-30 RX ADMIN — FENTANYL CITRATE 50 MCG: 50 INJECTION, SOLUTION INTRAMUSCULAR; INTRAVENOUS at 16:11

## 2023-06-30 RX ADMIN — SODIUM CHLORIDE, PRESERVATIVE FREE 10 ML: 5 INJECTION INTRAVENOUS at 23:32

## 2023-06-30 RX ADMIN — HYDROMORPHONE HYDROCHLORIDE 0.25 MG: 1 INJECTION, SOLUTION INTRAMUSCULAR; INTRAVENOUS; SUBCUTANEOUS at 18:52

## 2023-06-30 RX ADMIN — TAMSULOSIN HYDROCHLORIDE 0.4 MG: 0.4 CAPSULE ORAL at 18:30

## 2023-06-30 ASSESSMENT — PAIN - FUNCTIONAL ASSESSMENT: PAIN_FUNCTIONAL_ASSESSMENT: 0-10

## 2023-06-30 ASSESSMENT — ENCOUNTER SYMPTOMS
SHORTNESS OF BREATH: 0
WHEEZING: 0
NAUSEA: 0
ABDOMINAL PAIN: 0
COUGH: 0
RHINORRHEA: 0
DIARRHEA: 0
VOMITING: 0

## 2023-06-30 ASSESSMENT — PAIN DESCRIPTION - ORIENTATION: ORIENTATION: LEFT

## 2023-06-30 ASSESSMENT — PAIN SCALES - GENERAL
PAINLEVEL_OUTOF10: 7
PAINLEVEL_OUTOF10: 8

## 2023-06-30 ASSESSMENT — PAIN DESCRIPTION - LOCATION: LOCATION: GROIN

## 2023-06-30 ASSESSMENT — PAIN DESCRIPTION - DESCRIPTORS: DESCRIPTORS: DISCOMFORT

## 2023-07-01 VITALS
HEIGHT: 71 IN | WEIGHT: 126.76 LBS | RESPIRATION RATE: 18 BRPM | DIASTOLIC BLOOD PRESSURE: 94 MMHG | OXYGEN SATURATION: 98 % | SYSTOLIC BLOOD PRESSURE: 166 MMHG | HEART RATE: 102 BPM | TEMPERATURE: 98.8 F | BODY MASS INDEX: 17.75 KG/M2

## 2023-07-01 LAB
ANION GAP SERPL CALCULATED.3IONS-SCNC: 8 MMOL/L (ref 3–16)
BASOPHILS # BLD: 0 K/UL (ref 0–0.2)
BASOPHILS NFR BLD: 0.5 %
BUN SERPL-MCNC: 21 MG/DL (ref 7–20)
CALCIUM SERPL-MCNC: 9.2 MG/DL (ref 8.3–10.6)
CHLORIDE SERPL-SCNC: 99 MMOL/L (ref 99–110)
CO2 SERPL-SCNC: 30 MMOL/L (ref 21–32)
CREAT SERPL-MCNC: 7.8 MG/DL (ref 0.8–1.3)
DEPRECATED RDW RBC AUTO: 16.1 % (ref 12.4–15.4)
EOSINOPHIL # BLD: 0 K/UL (ref 0–0.6)
EOSINOPHIL NFR BLD: 0 %
GFR SERPLBLD CREATININE-BSD FMLA CKD-EPI: 7 ML/MIN/{1.73_M2}
GLUCOSE SERPL-MCNC: 132 MG/DL (ref 70–99)
HCT VFR BLD AUTO: 28.6 % (ref 40.5–52.5)
HGB BLD-MCNC: 9.6 G/DL (ref 13.5–17.5)
LYMPHOCYTES # BLD: 0.6 K/UL (ref 1–5.1)
LYMPHOCYTES NFR BLD: 9.9 %
MCH RBC QN AUTO: 30.5 PG (ref 26–34)
MCHC RBC AUTO-ENTMCNC: 33.6 G/DL (ref 31–36)
MCV RBC AUTO: 90.6 FL (ref 80–100)
MONOCYTES # BLD: 0.6 K/UL (ref 0–1.3)
MONOCYTES NFR BLD: 10.1 %
NEUTROPHILS # BLD: 4.9 K/UL (ref 1.7–7.7)
NEUTROPHILS NFR BLD: 79.5 %
PLATELET # BLD AUTO: 105 K/UL (ref 135–450)
PMV BLD AUTO: 8.5 FL (ref 5–10.5)
POTASSIUM SERPL-SCNC: 5 MMOL/L (ref 3.5–5.1)
RBC # BLD AUTO: 3.16 M/UL (ref 4.2–5.9)
SODIUM SERPL-SCNC: 137 MMOL/L (ref 136–145)
WBC # BLD AUTO: 6.2 K/UL (ref 4–11)

## 2023-07-01 PROCEDURE — 2580000003 HC RX 258: Performed by: UROLOGY

## 2023-07-01 PROCEDURE — 90935 HEMODIALYSIS ONE EVALUATION: CPT

## 2023-07-01 PROCEDURE — 36591 DRAW BLOOD OFF VENOUS DEVICE: CPT

## 2023-07-01 PROCEDURE — 85025 COMPLETE CBC W/AUTO DIFF WBC: CPT

## 2023-07-01 PROCEDURE — 6370000000 HC RX 637 (ALT 250 FOR IP): Performed by: UROLOGY

## 2023-07-01 PROCEDURE — 5A1D70Z PERFORMANCE OF URINARY FILTRATION, INTERMITTENT, LESS THAN 6 HOURS PER DAY: ICD-10-PCS | Performed by: INTERNAL MEDICINE

## 2023-07-01 PROCEDURE — 80048 BASIC METABOLIC PNL TOTAL CA: CPT

## 2023-07-01 RX ORDER — TAMSULOSIN HYDROCHLORIDE 0.4 MG/1
0.4 CAPSULE ORAL DAILY
Qty: 30 CAPSULE | Refills: 3 | Status: SHIPPED | OUTPATIENT
Start: 2023-07-02

## 2023-07-01 RX ADMIN — AMLODIPINE BESYLATE 5 MG: 5 TABLET ORAL at 09:38

## 2023-07-01 RX ADMIN — TAMSULOSIN HYDROCHLORIDE 0.4 MG: 0.4 CAPSULE ORAL at 09:38

## 2023-07-01 RX ADMIN — SODIUM CHLORIDE, PRESERVATIVE FREE 10 ML: 5 INJECTION INTRAVENOUS at 09:38

## 2023-07-01 ASSESSMENT — PAIN DESCRIPTION - ORIENTATION
ORIENTATION: LEFT
ORIENTATION: LEFT

## 2023-07-01 ASSESSMENT — PAIN DESCRIPTION - DESCRIPTORS
DESCRIPTORS: DISCOMFORT
DESCRIPTORS: DISCOMFORT

## 2023-07-01 ASSESSMENT — PAIN SCALES - WONG BAKER
WONGBAKER_NUMERICALRESPONSE: 0

## 2023-07-01 ASSESSMENT — PAIN SCALES - GENERAL
PAINLEVEL_OUTOF10: 0
PAINLEVEL_OUTOF10: 0

## 2023-07-01 ASSESSMENT — PAIN DESCRIPTION - LOCATION
LOCATION: GROIN
LOCATION: GROIN

## 2023-07-12 ENCOUNTER — HOSPITAL ENCOUNTER (OUTPATIENT)
Age: 63
Discharge: HOME | End: 2023-07-12
Payer: MEDICARE

## 2023-07-12 DIAGNOSIS — N18.32 STAGE 3B CHRONIC KIDNEY DISEASE (HCC): ICD-10-CM

## 2023-07-12 LAB
ALBUMIN SERPL-MCNC: 3.4 G/DL (ref 3.4–5)
ANION GAP SERPL CALCULATED.3IONS-SCNC: 13 MMOL/L (ref 3–16)
BUN SERPL-MCNC: 31 MG/DL (ref 7–20)
CALCIUM SERPL-MCNC: 8.6 MG/DL (ref 8.3–10.6)
CHLORIDE SERPL-SCNC: 97 MMOL/L (ref 99–110)
CO2 SERPL-SCNC: 27 MMOL/L (ref 21–32)
CREAT SERPL-MCNC: 7.4 MG/DL (ref 0.8–1.3)
DEPRECATED RDW RBC AUTO: 16.7 % (ref 12.4–15.4)
GFR SERPLBLD CREATININE-BSD FMLA CKD-EPI: 8 ML/MIN/{1.73_M2}
GLUCOSE SERPL-MCNC: 101 MG/DL (ref 70–99)
HCT VFR BLD AUTO: 20.6 % (ref 40.5–52.5)
HGB BLD-MCNC: 6.9 G/DL (ref 13.5–17.5)
MCH RBC QN AUTO: 30.8 PG (ref 26–34)
MCHC RBC AUTO-ENTMCNC: 33.6 G/DL (ref 31–36)
MCV RBC AUTO: 91.7 FL (ref 80–100)
PHOSPHATE SERPL-MCNC: 4.3 MG/DL (ref 2.5–4.9)
PLATELET # BLD AUTO: 209 K/UL (ref 135–450)
PMV BLD AUTO: 7.1 FL (ref 5–10.5)
POTASSIUM SERPL-SCNC: 4.4 MMOL/L (ref 3.5–5.1)
RBC # BLD AUTO: 2.25 M/UL (ref 4.2–5.9)
SODIUM SERPL-SCNC: 137 MMOL/L (ref 136–145)
WBC # BLD AUTO: 9 K/UL (ref 4–11)

## 2023-07-12 PROCEDURE — 85027 COMPLETE CBC AUTOMATED: CPT

## 2023-07-12 PROCEDURE — 80069 RENAL FUNCTION PANEL: CPT

## 2023-07-12 PROCEDURE — 36415 COLL VENOUS BLD VENIPUNCTURE: CPT

## 2023-07-14 ENCOUNTER — HOSPITAL ENCOUNTER (OUTPATIENT)
Age: 63
Setting detail: OBSERVATION
LOS: 1 days | Discharge: HOME | End: 2023-07-15
Attending: EMERGENCY MEDICINE | Admitting: HOSPITALIST
Payer: COMMERCIAL

## 2023-07-14 ENCOUNTER — HOSPITAL ENCOUNTER (OUTPATIENT)
Age: 63
Discharge: HOME | End: 2023-07-14
Payer: MEDICARE

## 2023-07-14 ENCOUNTER — APPOINTMENT (OUTPATIENT)
Dept: INTERVENTIONAL RADIOLOGY/VASCULAR | Age: 63
End: 2023-07-14
Payer: COMMERCIAL

## 2023-07-14 DIAGNOSIS — T82.9XXA COMPLICATION ASSOCIATED WITH DIALYSIS CATHETER: ICD-10-CM

## 2023-07-14 DIAGNOSIS — D64.9 ANEMIA, UNSPECIFIED TYPE: Primary | ICD-10-CM

## 2023-07-14 LAB
ABO + RH BLD: NORMAL
ANION GAP SERPL CALCULATED.3IONS-SCNC: 11 MMOL/L (ref 3–16)
BACTERIA URNS QL MICRO: ABNORMAL /HPF
BASOPHILS # BLD: 0.1 K/UL (ref 0–0.2)
BASOPHILS NFR BLD: 0.7 %
BILIRUB UR QL STRIP.AUTO: NEGATIVE
BLD GP AB SCN SERPL QL: NORMAL
BLOOD BANK DISPENSE STATUS: NORMAL
BLOOD BANK PRODUCT CODE: NORMAL
BPU ID: NORMAL
BUN SERPL-MCNC: 26 MG/DL (ref 7–20)
CALCIUM SERPL-MCNC: 8.8 MG/DL (ref 8.3–10.6)
CHLORIDE SERPL-SCNC: 97 MMOL/L (ref 99–110)
CLARITY UR: ABNORMAL
CO2 SERPL-SCNC: 31 MMOL/L (ref 21–32)
COLOR UR: ABNORMAL
CREAT SERPL-MCNC: 7.2 MG/DL (ref 0.8–1.3)
CREAT UR-MCNC: 169.8 MG/DL (ref 39–259)
DEPRECATED RDW RBC AUTO: 16.5 % (ref 12.4–15.4)
DESCRIPTION BLOOD BANK: NORMAL
EOSINOPHIL # BLD: 0 K/UL (ref 0–0.6)
EOSINOPHIL NFR BLD: 0.5 %
EPI CELLS #/AREA URNS HPF: ABNORMAL /HPF (ref 0–5)
GFR SERPLBLD CREATININE-BSD FMLA CKD-EPI: 8 ML/MIN/{1.73_M2}
GLUCOSE SERPL-MCNC: 98 MG/DL (ref 70–99)
GLUCOSE UR STRIP.AUTO-MCNC: NEGATIVE MG/DL
HCT VFR BLD AUTO: 18.6 % (ref 40.5–52.5)
HCT VFR BLD AUTO: 29.5 % (ref 40.5–52.5)
HGB BLD-MCNC: 6.2 G/DL (ref 13.5–17.5)
HGB BLD-MCNC: 9.7 G/DL (ref 13.5–17.5)
HGB UR QL STRIP.AUTO: ABNORMAL
INR PPP: 1.17 (ref 0.84–1.16)
IRON SATN MFR SERPL: 17 % (ref 20–50)
IRON SERPL-MCNC: 28 UG/DL (ref 59–158)
KETONES UR STRIP.AUTO-MCNC: NEGATIVE MG/DL
LEUKOCYTE ESTERASE UR QL STRIP.AUTO: ABNORMAL
LYMPHOCYTES # BLD: 0.5 K/UL (ref 1–5.1)
LYMPHOCYTES NFR BLD: 6.4 %
MCH RBC QN AUTO: 30.9 PG (ref 26–34)
MCHC RBC AUTO-ENTMCNC: 33.3 G/DL (ref 31–36)
MCV RBC AUTO: 92.8 FL (ref 80–100)
MICROALBUMIN UR DL<=1MG/L-MCNC: 238.2 MG/DL
MICROALBUMIN/CREAT UR: 1402.8 MG/G (ref 0–30)
MONOCYTES # BLD: 0.9 K/UL (ref 0–1.3)
MONOCYTES NFR BLD: 11 %
NEUTROPHILS # BLD: 6.7 K/UL (ref 1.7–7.7)
NEUTROPHILS NFR BLD: 81.4 %
NITRITE UR QL STRIP.AUTO: NEGATIVE
PH UR STRIP.AUTO: 6 [PH] (ref 5–8)
PLATELET # BLD AUTO: 213 K/UL (ref 135–450)
PMV BLD AUTO: 7 FL (ref 5–10.5)
POTASSIUM SERPL-SCNC: 4 MMOL/L (ref 3.5–5.1)
PROT UR STRIP.AUTO-MCNC: 300 MG/DL
PROT UR-MCNC: 630 MG/DL
PROT/CREAT UR-RTO: 3.7 MG/DL
PROTHROMBIN TIME: 14.9 SEC (ref 11.5–14.8)
RBC # BLD AUTO: 2.01 M/UL (ref 4.2–5.9)
RBC #/AREA URNS HPF: ABNORMAL /HPF (ref 0–4)
SODIUM SERPL-SCNC: 139 MMOL/L (ref 136–145)
SP GR UR STRIP.AUTO: 1.02 (ref 1–1.03)
TIBC SERPL-MCNC: 165 UG/DL (ref 260–445)
TSH SERPL DL<=0.005 MIU/L-ACNC: 1.05 UIU/ML (ref 0.27–4.2)
UA DIPSTICK W REFLEX MICRO PNL UR: YES
URN SPEC COLLECT METH UR: ABNORMAL
UROBILINOGEN UR STRIP-ACNC: 1 E.U./DL
WBC # BLD AUTO: 8.2 K/UL (ref 4–11)
WBC #/AREA URNS HPF: ABNORMAL /HPF (ref 0–5)

## 2023-07-14 PROCEDURE — 80048 BASIC METABOLIC PNL TOTAL CA: CPT

## 2023-07-14 PROCEDURE — 82570 ASSAY OF URINE CREATININE: CPT

## 2023-07-14 PROCEDURE — 82746 ASSAY OF FOLIC ACID SERUM: CPT

## 2023-07-14 PROCEDURE — 83550 IRON BINDING TEST: CPT

## 2023-07-14 PROCEDURE — 2500000003 HC RX 250 WO HCPCS: Performed by: HOSPITALIST

## 2023-07-14 PROCEDURE — 86923 COMPATIBILITY TEST ELECTRIC: CPT

## 2023-07-14 PROCEDURE — 86901 BLOOD TYPING SEROLOGIC RH(D): CPT

## 2023-07-14 PROCEDURE — 1200000000 HC SEMI PRIVATE

## 2023-07-14 PROCEDURE — 6370000000 HC RX 637 (ALT 250 FOR IP): Performed by: HOSPITALIST

## 2023-07-14 PROCEDURE — 81001 URINALYSIS AUTO W/SCOPE: CPT

## 2023-07-14 PROCEDURE — P9016 RBC LEUKOCYTES REDUCED: HCPCS

## 2023-07-14 PROCEDURE — 86900 BLOOD TYPING SEROLOGIC ABO: CPT

## 2023-07-14 PROCEDURE — 99285 EMERGENCY DEPT VISIT HI MDM: CPT

## 2023-07-14 PROCEDURE — 82607 VITAMIN B-12: CPT

## 2023-07-14 PROCEDURE — 85025 COMPLETE CBC W/AUTO DIFF WBC: CPT

## 2023-07-14 PROCEDURE — 2580000003 HC RX 258: Performed by: HOSPITALIST

## 2023-07-14 PROCEDURE — 86850 RBC ANTIBODY SCREEN: CPT

## 2023-07-14 PROCEDURE — 83540 ASSAY OF IRON: CPT

## 2023-07-14 PROCEDURE — 85610 PROTHROMBIN TIME: CPT

## 2023-07-14 PROCEDURE — C1769 GUIDE WIRE: HCPCS

## 2023-07-14 PROCEDURE — 77001 FLUOROGUIDE FOR VEIN DEVICE: CPT

## 2023-07-14 PROCEDURE — 82043 UR ALBUMIN QUANTITATIVE: CPT

## 2023-07-14 PROCEDURE — 36415 COLL VENOUS BLD VENIPUNCTURE: CPT

## 2023-07-14 PROCEDURE — 84443 ASSAY THYROID STIM HORMONE: CPT

## 2023-07-14 PROCEDURE — 85018 HEMOGLOBIN: CPT

## 2023-07-14 PROCEDURE — 84156 ASSAY OF PROTEIN URINE: CPT

## 2023-07-14 PROCEDURE — 36581 REPLACE TUNNELED CV CATH: CPT

## 2023-07-14 PROCEDURE — 6360000002 HC RX W HCPCS: Performed by: HOSPITALIST

## 2023-07-14 PROCEDURE — 85014 HEMATOCRIT: CPT

## 2023-07-14 PROCEDURE — 36430 TRANSFUSION BLD/BLD COMPNT: CPT

## 2023-07-14 RX ORDER — AMLODIPINE BESYLATE 5 MG/1
5 TABLET ORAL DAILY
Status: DISCONTINUED | OUTPATIENT
Start: 2023-07-14 | End: 2023-07-14

## 2023-07-14 RX ORDER — SODIUM CHLORIDE 9 MG/ML
INJECTION, SOLUTION INTRAVENOUS
Status: DISPENSED
Start: 2023-07-14 | End: 2023-07-14

## 2023-07-14 RX ORDER — ONDANSETRON 4 MG/1
4 TABLET, ORALLY DISINTEGRATING ORAL EVERY 8 HOURS PRN
Status: DISCONTINUED | OUTPATIENT
Start: 2023-07-14 | End: 2023-07-15 | Stop reason: HOSPADM

## 2023-07-14 RX ORDER — SODIUM CHLORIDE 9 MG/ML
INJECTION, SOLUTION INTRAVENOUS PRN
Status: DISCONTINUED | OUTPATIENT
Start: 2023-07-14 | End: 2023-07-15 | Stop reason: HOSPADM

## 2023-07-14 RX ORDER — LIDOCAINE HYDROCHLORIDE AND EPINEPHRINE 10; 10 MG/ML; UG/ML
7.5 INJECTION, SOLUTION INFILTRATION; PERINEURAL ONCE
Status: DISCONTINUED | OUTPATIENT
Start: 2023-07-14 | End: 2023-07-15 | Stop reason: HOSPADM

## 2023-07-14 RX ORDER — TAMSULOSIN HYDROCHLORIDE 0.4 MG/1
0.4 CAPSULE ORAL DAILY
Status: DISCONTINUED | OUTPATIENT
Start: 2023-07-14 | End: 2023-07-14

## 2023-07-14 RX ORDER — TAMSULOSIN HYDROCHLORIDE 0.4 MG/1
0.4 CAPSULE ORAL DAILY
Status: DISCONTINUED | OUTPATIENT
Start: 2023-07-14 | End: 2023-07-15 | Stop reason: HOSPADM

## 2023-07-14 RX ORDER — HEPARIN SODIUM 5000 [USP'U]/ML
6000 INJECTION, SOLUTION INTRAVENOUS; SUBCUTANEOUS ONCE
Status: COMPLETED | OUTPATIENT
Start: 2023-07-14 | End: 2023-07-14

## 2023-07-14 RX ORDER — ONDANSETRON 2 MG/ML
4 INJECTION INTRAMUSCULAR; INTRAVENOUS EVERY 6 HOURS PRN
Status: DISCONTINUED | OUTPATIENT
Start: 2023-07-14 | End: 2023-07-15 | Stop reason: HOSPADM

## 2023-07-14 RX ORDER — POLYETHYLENE GLYCOL 3350 17 G/17G
17 POWDER, FOR SOLUTION ORAL DAILY PRN
Status: DISCONTINUED | OUTPATIENT
Start: 2023-07-14 | End: 2023-07-15 | Stop reason: HOSPADM

## 2023-07-14 RX ORDER — DIPHENOXYLATE HYDROCHLORIDE AND ATROPINE SULFATE 2.5; .025 MG/1; MG/1
2 TABLET ORAL 4 TIMES DAILY PRN
Status: DISCONTINUED | OUTPATIENT
Start: 2023-07-14 | End: 2023-07-15 | Stop reason: HOSPADM

## 2023-07-14 RX ORDER — SODIUM CHLORIDE 0.9 % (FLUSH) 0.9 %
5-40 SYRINGE (ML) INJECTION PRN
Status: DISCONTINUED | OUTPATIENT
Start: 2023-07-14 | End: 2023-07-15 | Stop reason: HOSPADM

## 2023-07-14 RX ORDER — AMLODIPINE BESYLATE 5 MG/1
5 TABLET ORAL DAILY
Status: DISCONTINUED | OUTPATIENT
Start: 2023-07-14 | End: 2023-07-15 | Stop reason: HOSPADM

## 2023-07-14 RX ORDER — ACETAMINOPHEN 325 MG/1
650 TABLET ORAL EVERY 6 HOURS PRN
Status: DISCONTINUED | OUTPATIENT
Start: 2023-07-14 | End: 2023-07-15 | Stop reason: HOSPADM

## 2023-07-14 RX ORDER — ACETAMINOPHEN 650 MG/1
650 SUPPOSITORY RECTAL EVERY 6 HOURS PRN
Status: DISCONTINUED | OUTPATIENT
Start: 2023-07-14 | End: 2023-07-15 | Stop reason: HOSPADM

## 2023-07-14 RX ORDER — SODIUM CHLORIDE 0.9 % (FLUSH) 0.9 %
5-40 SYRINGE (ML) INJECTION EVERY 12 HOURS SCHEDULED
Status: DISCONTINUED | OUTPATIENT
Start: 2023-07-14 | End: 2023-07-15 | Stop reason: HOSPADM

## 2023-07-14 RX ORDER — LIDOCAINE HYDROCHLORIDE 10 MG/ML
10 INJECTION, SOLUTION EPIDURAL; INFILTRATION; INTRACAUDAL; PERINEURAL ONCE
Status: COMPLETED | OUTPATIENT
Start: 2023-07-14 | End: 2023-07-14

## 2023-07-14 RX ORDER — ENOXAPARIN SODIUM 100 MG/ML
40 INJECTION SUBCUTANEOUS DAILY
Status: DISCONTINUED | OUTPATIENT
Start: 2023-07-14 | End: 2023-07-15 | Stop reason: HOSPADM

## 2023-07-14 RX ADMIN — SODIUM CHLORIDE, PRESERVATIVE FREE 10 ML: 5 INJECTION INTRAVENOUS at 13:41

## 2023-07-14 RX ADMIN — SODIUM CHLORIDE, PRESERVATIVE FREE 10 ML: 5 INJECTION INTRAVENOUS at 19:49

## 2023-07-14 RX ADMIN — AMLODIPINE BESYLATE 5 MG: 5 TABLET ORAL at 16:44

## 2023-07-14 RX ADMIN — HEPARIN SODIUM 5800 UNITS: 5000 INJECTION INTRAVENOUS; SUBCUTANEOUS at 15:50

## 2023-07-14 RX ADMIN — TAMSULOSIN HYDROCHLORIDE 0.4 MG: 0.4 CAPSULE ORAL at 16:44

## 2023-07-14 RX ADMIN — LIDOCAINE HYDROCHLORIDE 10 ML: 10 INJECTION, SOLUTION EPIDURAL; INFILTRATION; INTRACAUDAL; PERINEURAL at 15:51

## 2023-07-14 ASSESSMENT — LIFESTYLE VARIABLES
HOW OFTEN DO YOU HAVE A DRINK CONTAINING ALCOHOL: NEVER
HOW MANY STANDARD DRINKS CONTAINING ALCOHOL DO YOU HAVE ON A TYPICAL DAY: PATIENT DOES NOT DRINK

## 2023-07-14 NOTE — ED PROVIDER NOTES
response to medications, interpretation of diagnostics, review of nursing notes, pertinent record review, discussions about patient condition, consultation time, documentation time. Critical care due to the patient's anemia. CONSULTATIONS:  Dr Ever Vo, Hospitalist, LAYLA Jean Whitney is a 58 y.o. male who presented with concern for dialysis catheter malfunction. I have placed consultation with interventional radiology to evaluate the problem. He has recently noted to be anemic. He has recurrent anemia today. He has accepted the blood transfusion. I have been in consultation with Dr. Ever Vo who will follow along from the nephrology point of view. I discussed the case in full with the admitting physician. FINAL IMPRESSION:    1. Anemia, unspecified type    2. Complication associated with dialysis catheter      (Please note that I used voice recognition software to generate this note.   Occasionally words are mistranscribed despite my efforts to edit errors.)        Jo Lemus MD  07/14/23 5656

## 2023-07-14 NOTE — ED NOTES
ED TO INPATIENT SBAR HANDOFF    Patient Name: Juan A Prior   :  1960  58 y.o. MRN:  [id number]  Preferred Name    ED Room #:  ED-0019/19  Family/Caregiver Present yes   Restraints no   Sitter no   Sepsis Risk Score Sepsis Risk Score: 2.49    Situation  Code Status: Prior No additional code details. Allergies: Patient has no known allergies. Weight: No data found. Arrived from: home  Chief Complaint:   Chief Complaint   Patient presents with    Vascular Access Problem     Patient in with complaints that his dialysis cath clotted yesterday 15 minutes before his treatment was due to finish. Hospital Problem/Diagnosis:  Principal Problem:    Symptomatic anemia  Resolved Problems:    * No resolved hospital problems.  *    Imaging:   IR REPLACE TUNNELED CVC W SQ PORT SAME ACCESS    (Results Pending)     Abnormal labs:   Abnormal Labs Reviewed   CBC WITH AUTO DIFFERENTIAL - Abnormal; Notable for the following components:       Result Value    RBC 2.01 (*)     Hemoglobin 6.2 (*)     Hematocrit 18.6 (*)     RDW 16.5 (*)     Lymphocytes Absolute 0.5 (*)     All other components within normal limits    Narrative:     CALL  [facility] tel. [phone],  Hematology results called to and read back by saranya michelle rn, 2023  09:07, by [address] - Abnormal; Notable for the following components:    Chloride 97 (*)     BUN 26 (*)     Creatinine 7.2 (*)     Est, Glom Filt Rate 8 (*)     All other components within normal limits    Narrative:     CALL  [facility] tel. [phone],  Chemistry results called to and read back by Gary Yee, 2023 09:29,  by [facility] - Abnormal; Notable for the following components:    Color, UA ORANGE (*)     Clarity, UA TURBID (*)     Blood, Urine LARGE (*)     Leukocyte Esterase, Urine LARGE (*)     WBC, UA  (*)     RBC, UA 21-50 (*)     Bacteria, UA 3+ (*)     All other components within normal limits

## 2023-07-14 NOTE — H&P
HOSPITALISTS HISTORY AND PHYSICAL    7/14/2023 2:22 PM    Patient Information:  Mariam Pagan is a 58 y.o. male [id number]  PCP:  Zulay Germain MD (Tel: [phone] )    Chief complaint:    Chief Complaint   Patient presents with    Vascular Access Problem     Patient in with complaints that his dialysis cath clotted yesterday 15 minutes before his treatment was due to finish. History of Present Illness:  Mariam Pagan is a 58 y.o. male who presented with ER with complaints of his catheter out. This appears outpatient dialysis 50 minutes into dialysis. Treatment was due to venous dialysis catheter clotted off. Patient was sent to ER for further evaluation on arrival to ER patient was found to have anemia with hemoglobin down to 6.2 patient denies any signs of bleeding no dark tarry stool no other significant chest pain or shortness of breath. Patient  REVIEW OF SYSTEMS:   Constitutional: Negative for fever,chills or night sweats  ENT: Negative for rhinorrhea, epistaxis, hoarseness, sore throat. Respiratory: Negative for shortness of breath,wheezing  Cardiovascular: Negative for chest pain, palpitations   Gastrointestinal: Negative for nausea, vomiting, diarrhea  Genitourinary: Negative for polyuria, dysuria   Hematologic/Lymphatic: Negative for bleeding tendency, easy bruising  Musculoskeletal: Negative for myalgias and arthralgias  Neurologic: Negative for confusion,dysarthria. Skin: Negative for itching,rash, good capillary refill. Psychiatric: Negative for depression,anxiety, agitation. Endocrine: Negative for polydipsia,polyuria,heat /cold intolerance.     Past Medical History:   has a past medical history of Anesthesia, Chronic diarrhea, Esophageal cancer ([address]), Hyperlipidemia, Kidney disease, Lung cancer ([address]), Neuropathy, and

## 2023-07-14 NOTE — BRIEF OP NOTE
Brief Postoperative Note    Kirti Estimable  YOB: 1960  [id number]    Pre-operative Diagnosis: ESRD with malfunctioning HD catheter    Post-operative Diagnosis: Same    Procedure: Tunneled hemodialysis catheter Exchange    Anesthesia: local    Surgeons: Rubi Keen MD    Estimated Blood Loss: Less than 5 mL    Complications: None    Specimens: Was Not Obtained    Findings: Successful exchange of the right femoral tunneled hemodialysis catheter.     Electronically signed by Rubi Keen MD on 7/14/2023 at 3:36 PM

## 2023-07-15 VITALS
SYSTOLIC BLOOD PRESSURE: 150 MMHG | HEART RATE: 67 BPM | OXYGEN SATURATION: 96 % | TEMPERATURE: 96.2 F | RESPIRATION RATE: 16 BRPM | DIASTOLIC BLOOD PRESSURE: 84 MMHG | BODY MASS INDEX: 16.85 KG/M2 | WEIGHT: 120.81 LBS

## 2023-07-15 PROBLEM — D64.9 ANEMIA: Status: ACTIVE | Noted: 2023-07-15

## 2023-07-15 LAB
ANION GAP SERPL CALCULATED.3IONS-SCNC: 10 MMOL/L (ref 3–16)
BASOPHILS # BLD: 0.1 K/UL (ref 0–0.2)
BASOPHILS NFR BLD: 0.7 %
BUN SERPL-MCNC: 32 MG/DL (ref 7–20)
CALCIUM SERPL-MCNC: 8.5 MG/DL (ref 8.3–10.6)
CHLORIDE SERPL-SCNC: 97 MMOL/L (ref 99–110)
CO2 SERPL-SCNC: 28 MMOL/L (ref 21–32)
CREAT SERPL-MCNC: 9.8 MG/DL (ref 0.8–1.3)
DEPRECATED RDW RBC AUTO: 15.8 % (ref 12.4–15.4)
EOSINOPHIL # BLD: 0.1 K/UL (ref 0–0.6)
EOSINOPHIL NFR BLD: 0.9 %
FOLATE SERPL-MCNC: >20 NG/ML (ref 4.78–24.2)
GFR SERPLBLD CREATININE-BSD FMLA CKD-EPI: 5 ML/MIN/{1.73_M2}
GLUCOSE SERPL-MCNC: 97 MG/DL (ref 70–99)
HCT VFR BLD AUTO: 21.8 % (ref 40.5–52.5)
HGB BLD-MCNC: 7.4 G/DL (ref 13.5–17.5)
LYMPHOCYTES # BLD: 0.7 K/UL (ref 1–5.1)
LYMPHOCYTES NFR BLD: 7.3 %
MCH RBC QN AUTO: 30.7 PG (ref 26–34)
MCHC RBC AUTO-ENTMCNC: 34 G/DL (ref 31–36)
MCV RBC AUTO: 90.3 FL (ref 80–100)
MONOCYTES # BLD: 1.3 K/UL (ref 0–1.3)
MONOCYTES NFR BLD: 13.8 %
NEUTROPHILS # BLD: 7.1 K/UL (ref 1.7–7.7)
NEUTROPHILS NFR BLD: 77.3 %
PLATELET # BLD AUTO: 201 K/UL (ref 135–450)
PMV BLD AUTO: 6.9 FL (ref 5–10.5)
POTASSIUM SERPL-SCNC: 3.8 MMOL/L (ref 3.5–5.1)
RBC # BLD AUTO: 2.42 M/UL (ref 4.2–5.9)
SODIUM SERPL-SCNC: 135 MMOL/L (ref 136–145)
VIT B12 SERPL-MCNC: 1025 PG/ML (ref 211–911)
WBC # BLD AUTO: 9.1 K/UL (ref 4–11)

## 2023-07-15 PROCEDURE — 90935 HEMODIALYSIS ONE EVALUATION: CPT

## 2023-07-15 PROCEDURE — 6360000002 HC RX W HCPCS: Performed by: INTERNAL MEDICINE

## 2023-07-15 PROCEDURE — 2580000003 HC RX 258: Performed by: HOSPITALIST

## 2023-07-15 PROCEDURE — G0378 HOSPITAL OBSERVATION PER HR: HCPCS

## 2023-07-15 PROCEDURE — 36415 COLL VENOUS BLD VENIPUNCTURE: CPT

## 2023-07-15 PROCEDURE — 80048 BASIC METABOLIC PNL TOTAL CA: CPT

## 2023-07-15 PROCEDURE — 85025 COMPLETE CBC W/AUTO DIFF WBC: CPT

## 2023-07-15 PROCEDURE — 6370000000 HC RX 637 (ALT 250 FOR IP): Performed by: HOSPITALIST

## 2023-07-15 RX ADMIN — TAMSULOSIN HYDROCHLORIDE 0.4 MG: 0.4 CAPSULE ORAL at 08:11

## 2023-07-15 RX ADMIN — AMLODIPINE BESYLATE 5 MG: 5 TABLET ORAL at 08:11

## 2023-07-15 RX ADMIN — ERYTHROPOIETIN 10000 UNITS: 10000 INJECTION, SOLUTION INTRAVENOUS; SUBCUTANEOUS at 15:33

## 2023-07-15 RX ADMIN — IRON SUCROSE 100 MG: 20 INJECTION, SOLUTION INTRAVENOUS at 15:45

## 2023-07-15 RX ADMIN — SODIUM CHLORIDE, PRESERVATIVE FREE 10 ML: 5 INJECTION INTRAVENOUS at 08:12

## 2023-07-15 ASSESSMENT — PAIN SCALES - WONG BAKER
WONGBAKER_NUMERICALRESPONSE: 0

## 2023-07-15 NOTE — CARE COORDINATION
07/15/23 1002   Readmission Assessment   Number of Days since last admission? 8-30 days   Previous Disposition Home with Family   Who is being Interviewed Patient   What was the patient's/caregiver's perception as to why they think they needed to return back to the hospital? Other (Comment)  (clotted dialysis catheter)   Did you visit your Primary Care Physician after you left the hospital, before you returned this time? Yes   Did you see a specialist, such as Cardiac, Pulmonary, Orthopedic Physician, etc. after you left the hospital? Yes   Who advised the patient to return to the hospital? Other (Comment)  (dialysis staff)   Does the patient report anything that got in the way of taking their medications? No   In our efforts to provide the best possible care to you and others like you, can you think of anything that we could have done to help you after you left the hospital the first time, so that you might not have needed to return so soon?  Other (Comment)  (patient has sufficient help at home and is able to get medications, spouse helps as needed)

## 2023-07-15 NOTE — CONSULTS
ESRD care.      -As per my discussion with the dialysis nurse from dialysis center, reported that patient had clotted access on Thursday, patient was advised outpatient follow-up with Dr. Nora Mike, but as per the dialysis nurse patient decided to go to the hospital.  -Found to have hemoglobin 6.2, so being admitted for that also. No current active complaints. Patient denied chest pain / dizziness/lightheadedness/syncope/ SOB / leg edema.      Re: renal failure  Duration (when): Acute on chronic,  Location (where): kidneys  Severity (ex: CKD stage): Severe needing dialysis  Timing (ex: continuous, intermittent): intermittent HD  Context (ex: related to condition): Refer to the note   Modifying factors (ex: medications, interventions): dialysis support  Associated signs & symptoms (ex: edema, SOB): Refer to HPI and Chief complaint    Past medical, surgical, social and family medial history reviewed by me:   PAST MEDICAL HISTORY:   Past Medical History:   Diagnosis Date    Anesthesia     hypertension with anesthesia    Chronic diarrhea     patient reports chronic diarrhea for 1 year and sees a specialist at [address] [place] [address]: has been checked for c.dif multiple times and always negative    Esophageal cancer ([address])     2017 and diagnosed again 2023: started chemotherapy  6-6-23: on 2 different chemo treatment one is every 2 weeks and the other treatment is every 3 weeks    Hyperlipidemia     Kidney disease     Dialysis on Tuesday Thursday and Saturdays    Lung cancer [facility])     recent diagnosis 2023    Neuropathy     Prostate enlargement      PAST SURGICAL HISTORY:   Past Surgical History:   Procedure Laterality Date    COLONOSCOPY      COLONOSCOPY N/A 11/09/2022    COLONOSCOPY WITH BIOPSY performed by Michael Olivarez MD at [address] [place] [address]  05/24/2023    CT GUIDED CHEST TUBE 5/24/2023 Robin Bains MD [facility] CT SCAN    CT NEEDLE BIOPSY LUNG PERCUTANEOUS  05/24/2023    CT NEEDLE BIOPSY LUNG PERCUTANEOUS
Review:  IR REPLACE TUNNELED CVC W SQ PORT SAME ACCESS  Narrative: PROCEDURE:  FLUOROSCOPY GUIDED TUNNELED DIALYSIS CATHETER EXCHANGE.    7/14/2023    HISTORY:  ORDERING SYSTEM PROVIDED HISTORY: Pt says the catheter did not work for the  complete dialysis session  TECHNOLOGIST PROVIDED HISTORY:  Reason for exam:->Pt says the catheter did not work for the complete dialysis  session    SEDATION:  None. FLUOROSCOPY DOSE AND TYPE:    Fluoro time: 0.6 minutes    Cumulative air kerma: 3.12 mg    TECHNIQUE:  Informed consent was obtained after a detailed explanation of the procedure  including risks, benefits, and alternatives. All aspects of maximum sterile  barrier technique were used including washing hands with conventional soap  and water or with alcohol-based hand rubs (ABHR), skin preparation, cap,  mask, sterile gown, sterile gloves, and sterile full body drape. Local  anesthesia was achieved with lidocaine. The existing catheter was exchanged  over a stiff glide wire and a new tunneled hemodialysis catheter was placed  under fluoroscopic guidance. The catheter was sutured to the skin and the  patient tolerated the procedure well. Estimated blood loss: Less than 5 cc    FINDINGS:  The fluoroscopic image demonstrates good position of the newly placed  tunneled dialysis catheter. Impression: Successful fluoroscopic guided exchange of a right femoral tunneled dialysis  catheter.       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

## 2023-07-15 NOTE — PROGRESS NOTES
MD Keegan Hudson MD Edris Sida, MD                Office: [phone]                      Fax: [phone]          NEPHROLOGY  NOTE:     PATIENT NAME: Oscar Mtz  : 1960  MRN: [id number]  REASON FOR CONSULT: I am asked to see this patient in consultation for my opinion regarding management of ESRD. My recommendations will be communicated by way of shared medical record. IMPRESSION / RECOMMENDATION:      Admitted on:  2023  For:  Symptomatic anemia [D64.9]  Anemia, unspecified type [D62.8]  Complication associated with dialysis catheter [T82. 9XXA]    ICD-10-CM    1. Anemia, unspecified type  D64.9       2. Complication associated with dialysis catheter  T82. 9XXA             1. ROCAEL on CKD to likely ESRD on iHD: [facility]. - outpt HD center: [facility]    - Access: Femoral   catheter    - next HD Saturday    -As per my discussion with the dialysis nurse from dialysis center, reported that patient had clotted access on Thursday, patient was advised outpatient follow-up with Dr. Irma Thakur, but as per the dialysis nurse patient decided to go to the hospital.  -Found to have hemoglobin 6.2, so being admitted for that also. -Also interventional radiology, I discussed with the emergency room physician, who had consulted interventional radiologist urgently,  - And I noted order in the epic. - We will follow-up via schedule today. Last admission  \"Hematuria/ left hydronephrosis  - Urology evaluated, s/p left ureteral stent placemen  - Started on Flomax  - Making and passing urine post stent placement  - Will likely need left ureteroscopy in the future to see if underlying stone, mass, stricture\"    -Get 24-hour urine test, as patient and urologist is questioning about renal recovery which I think would be still not possible currently, with his creatinine being very high in the same range.   - Check 24-hour urine test but he
Nurse discharge summary from [address] [place] to home. This patient has had a discharge order placed. They are returning home and being picked up in the lobby. Discharge paperwork has been printed, highlighted, and gone over with the patient by this Nurse. Patient understands teaching and has no further questions at this time. IV has been removed with no complications. Telemetry has been removed. Pt has all belongings present.
Patient off the unit for dialysis.
Patient seen in ED, room 19. Admission completed with the following exceptions:  4 Eyes Assessment, Immunizations, Covid Vaccines, Rights and Responsibilities, Orientation to room, Plan of Care, Education/Learning Assessment and Education Plan, white board, height and weight, pain assessment and head to toe assessment. Patient is alert and oriented X 4. Patient lives at home with his spouse and is being admitted for symptomatic anemia. Home Medications as well as Outside Sources has been verbally reviewed with patient and updated as appropriate and is now Completed. Plan of care updated if indicated. All questions answered. Patient reports he has dialysis on Tuesday, Thursday, and Saturdays and also receives Chemotherapy for esophageal Cancer.
Shift assessment completed. Routine vitals stable. Scheduled medications given. Patient is awake, alert and oriented. Respirations are easy and unlabored. Patient does not appear to be in distress. Call light within reach.
Treatment time: 3 hours     Net UF: 1.5 L     Pre weight: 56.5 k g  Post weight: 54.8 kg  EDW:     Access used: Left chest TDC   Access function: well, tolerated 300 BFR     Medications or blood products given: retacrit 10k, venofer 100mg , heparin dwells     Regular outpatient schedule: TTS     Summary of response to treatment: well, no issues. VSS    Copy of dialysis treatment record placed in chart, to be scanned into EMR.
place and time. Assessment:   Condition: In stable condition.      Hematuria  Hydronephrosis sp stent placement  Renal failure  Met esophageal ca    Recc  Urine blood tinged but not heavy  Min urine output  Stent did not incr output much  Can ben and fu dr resendiz office    Chante Hearn MD  7/15/2023

## 2023-07-15 NOTE — CARE COORDINATION
Patient admitted with an anticipated short hospitalization length of stay. Chart reviewed and it appears that patient has minimal needs for discharge at this time. Please reach out to case management should any discharge needs be identified.

## 2023-07-21 ENCOUNTER — HOSPITAL ENCOUNTER (OUTPATIENT)
Dept: NON INVASIVE DIAGNOSTICS | Age: 63
Discharge: HOME | End: 2023-07-21
Payer: COMMERCIAL

## 2023-07-21 DIAGNOSIS — Z01.818 EXAMINATION PRIOR TO CHEMOTHERAPY: ICD-10-CM

## 2023-07-21 LAB
LV EF: 53 %
LVEF MODALITY: NORMAL

## 2023-07-21 PROCEDURE — 93306 TTE W/DOPPLER COMPLETE: CPT

## 2023-07-21 PROCEDURE — 93356 MYOCRD STRAIN IMG SPCKL TRCK: CPT

## 2023-07-28 ENCOUNTER — HOSPITAL ENCOUNTER (OUTPATIENT)
Dept: ONCOLOGY | Age: 63
Setting detail: INFUSION SERIES
Discharge: HOME | End: 2023-07-28
Payer: COMMERCIAL

## 2023-07-28 VITALS
DIASTOLIC BLOOD PRESSURE: 73 MMHG | RESPIRATION RATE: 16 BRPM | HEART RATE: 58 BPM | TEMPERATURE: 97.8 F | SYSTOLIC BLOOD PRESSURE: 154 MMHG | OXYGEN SATURATION: 100 %

## 2023-07-28 LAB
ABO + RH BLD: NORMAL
BLD GP AB SCN SERPL QL: NORMAL
BLOOD BANK DISPENSE STATUS: NORMAL
BLOOD BANK PRODUCT CODE: NORMAL
BPU ID: NORMAL
DESCRIPTION BLOOD BANK: NORMAL

## 2023-07-28 PROCEDURE — 86901 BLOOD TYPING SEROLOGIC RH(D): CPT

## 2023-07-28 PROCEDURE — 86923 COMPATIBILITY TEST ELECTRIC: CPT

## 2023-07-28 PROCEDURE — P9016 RBC LEUKOCYTES REDUCED: HCPCS

## 2023-07-28 PROCEDURE — 99211 OFF/OP EST MAY X REQ PHY/QHP: CPT

## 2023-07-28 PROCEDURE — 6370000000 HC RX 637 (ALT 250 FOR IP): Performed by: STUDENT IN AN ORGANIZED HEALTH CARE EDUCATION/TRAINING PROGRAM

## 2023-07-28 PROCEDURE — 36430 TRANSFUSION BLD/BLD COMPNT: CPT

## 2023-07-28 PROCEDURE — 2580000003 HC RX 258: Performed by: STUDENT IN AN ORGANIZED HEALTH CARE EDUCATION/TRAINING PROGRAM

## 2023-07-28 PROCEDURE — 86900 BLOOD TYPING SEROLOGIC ABO: CPT

## 2023-07-28 PROCEDURE — 36591 DRAW BLOOD OFF VENOUS DEVICE: CPT

## 2023-07-28 PROCEDURE — 86850 RBC ANTIBODY SCREEN: CPT

## 2023-07-28 RX ORDER — SEVELAMER HYDROCHLORIDE 800 MG/1
800 TABLET, FILM COATED ORAL
COMMUNITY

## 2023-07-28 RX ORDER — ACETAMINOPHEN 325 MG/1
650 TABLET ORAL SEE ADMIN INSTRUCTIONS
Status: COMPLETED | OUTPATIENT
Start: 2023-07-28 | End: 2023-07-28

## 2023-07-28 RX ORDER — SODIUM CHLORIDE 0.9 % (FLUSH) 0.9 %
5-40 SYRINGE (ML) INJECTION 2 TIMES DAILY
Status: DISCONTINUED | OUTPATIENT
Start: 2023-07-28 | End: 2023-07-29 | Stop reason: HOSPADM

## 2023-07-28 RX ORDER — SODIUM CHLORIDE 9 MG/ML
INJECTION, SOLUTION INTRAVENOUS PRN
Status: COMPLETED | OUTPATIENT
Start: 2023-07-28 | End: 2023-07-28

## 2023-07-28 RX ORDER — DIPHENHYDRAMINE HCL 25 MG
25 TABLET ORAL SEE ADMIN INSTRUCTIONS
Status: DISCONTINUED | OUTPATIENT
Start: 2023-07-28 | End: 2023-07-29 | Stop reason: HOSPADM

## 2023-07-28 RX ADMIN — SODIUM CHLORIDE: 9 INJECTION, SOLUTION INTRAVENOUS at 10:35

## 2023-07-28 RX ADMIN — SODIUM CHLORIDE, PRESERVATIVE FREE 10 ML: 5 INJECTION INTRAVENOUS at 10:34

## 2023-07-28 RX ADMIN — ACETAMINOPHEN 650 MG: 325 TABLET ORAL at 10:32

## 2023-07-28 NOTE — PROGRESS NOTES
Patient tolerating blood transfusion well for the first 15 minutes. No signs of an adverse reaction will continue to monitor.

## 2023-07-28 NOTE — PROGRESS NOTES
Blood transfusion given per [facility]. Transfusion complete. No signs of an adverse reaction. Given avs with signs and symptoms of a delayed reaction and when to call Dr. Yolanda Lucero questions answered. Discharged per ambulatory with family member.

## 2023-07-28 NOTE — PROGRESS NOTES
Patient ambulatory to infusion department for 1 unit of PRBC along with family member. Consent obtained per [facility]. Patient Right chest port accessed, blood return noted, 1 pink tube drawn sent to lab will await blood bank.

## 2023-07-31 ENCOUNTER — ANESTHESIA EVENT (OUTPATIENT)
Dept: OPERATING ROOM | Age: 63
End: 2023-07-31
Payer: COMMERCIAL

## 2023-07-31 ENCOUNTER — APPOINTMENT (OUTPATIENT)
Dept: GENERAL RADIOLOGY | Age: 63
End: 2023-07-31
Attending: UROLOGY
Payer: COMMERCIAL

## 2023-07-31 ENCOUNTER — HOSPITAL ENCOUNTER (OUTPATIENT)
Age: 63
Setting detail: OUTPATIENT SURGERY
Discharge: HOME | End: 2023-07-31
Attending: UROLOGY | Admitting: UROLOGY
Payer: COMMERCIAL

## 2023-07-31 ENCOUNTER — ANESTHESIA (OUTPATIENT)
Dept: OPERATING ROOM | Age: 63
End: 2023-07-31
Payer: COMMERCIAL

## 2023-07-31 VITALS
TEMPERATURE: 97.4 F | DIASTOLIC BLOOD PRESSURE: 80 MMHG | BODY MASS INDEX: 17.08 KG/M2 | HEART RATE: 64 BPM | HEIGHT: 71 IN | SYSTOLIC BLOOD PRESSURE: 161 MMHG | RESPIRATION RATE: 10 BRPM | OXYGEN SATURATION: 100 % | WEIGHT: 122 LBS

## 2023-07-31 LAB
ABO + RH BLD: NORMAL
ANION GAP SERPL CALCULATED.3IONS-SCNC: 9 MMOL/L (ref 3–16)
BLD GP AB SCN SERPL QL: NORMAL
BUN SERPL-MCNC: 12 MG/DL (ref 7–20)
CALCIUM SERPL-MCNC: 8.7 MG/DL (ref 8.3–10.6)
CHLORIDE SERPL-SCNC: 107 MMOL/L (ref 99–110)
CO2 SERPL-SCNC: 28 MMOL/L (ref 21–32)
CREAT SERPL-MCNC: 7.9 MG/DL (ref 0.8–1.3)
DEPRECATED RDW RBC AUTO: 17.4 % (ref 12.4–15.4)
GFR SERPLBLD CREATININE-BSD FMLA CKD-EPI: 7 ML/MIN/{1.73_M2}
GLUCOSE SERPL-MCNC: 87 MG/DL (ref 70–99)
HCT VFR BLD AUTO: 25.6 % (ref 40.5–52.5)
HGB BLD-MCNC: 8.2 G/DL (ref 13.5–17.5)
MCH RBC QN AUTO: 30.5 PG (ref 26–34)
MCHC RBC AUTO-ENTMCNC: 32 G/DL (ref 31–36)
MCV RBC AUTO: 95.5 FL (ref 80–100)
PLATELET # BLD AUTO: 86 K/UL (ref 135–450)
PMV BLD AUTO: 7.9 FL (ref 5–10.5)
POTASSIUM SERPL-SCNC: 4.2 MMOL/L (ref 3.5–5.1)
RBC # BLD AUTO: 2.68 M/UL (ref 4.2–5.9)
SODIUM SERPL-SCNC: 144 MMOL/L (ref 136–145)
WBC # BLD AUTO: 7.8 K/UL (ref 4–11)

## 2023-07-31 PROCEDURE — 2580000003 HC RX 258: Performed by: UROLOGY

## 2023-07-31 PROCEDURE — 7100000010 HC PHASE II RECOVERY - FIRST 15 MIN: Performed by: UROLOGY

## 2023-07-31 PROCEDURE — 2709999900 HC NON-CHARGEABLE SUPPLY: Performed by: UROLOGY

## 2023-07-31 PROCEDURE — 86850 RBC ANTIBODY SCREEN: CPT

## 2023-07-31 PROCEDURE — 6370000000 HC RX 637 (ALT 250 FOR IP): Performed by: ANESTHESIOLOGY

## 2023-07-31 PROCEDURE — 3600000014 HC SURGERY LEVEL 4 ADDTL 15MIN: Performed by: UROLOGY

## 2023-07-31 PROCEDURE — 3700000000 HC ANESTHESIA ATTENDED CARE: Performed by: UROLOGY

## 2023-07-31 PROCEDURE — 7100000000 HC PACU RECOVERY - FIRST 15 MIN: Performed by: UROLOGY

## 2023-07-31 PROCEDURE — 2500000003 HC RX 250 WO HCPCS: Performed by: NURSE ANESTHETIST, CERTIFIED REGISTERED

## 2023-07-31 PROCEDURE — 86901 BLOOD TYPING SEROLOGIC RH(D): CPT

## 2023-07-31 PROCEDURE — 3700000001 HC ADD 15 MINUTES (ANESTHESIA): Performed by: UROLOGY

## 2023-07-31 PROCEDURE — 7100000001 HC PACU RECOVERY - ADDTL 15 MIN: Performed by: UROLOGY

## 2023-07-31 PROCEDURE — 3600000004 HC SURGERY LEVEL 4 BASE: Performed by: UROLOGY

## 2023-07-31 PROCEDURE — 86900 BLOOD TYPING SEROLOGIC ABO: CPT

## 2023-07-31 PROCEDURE — 85027 COMPLETE CBC AUTOMATED: CPT

## 2023-07-31 PROCEDURE — 80048 BASIC METABOLIC PNL TOTAL CA: CPT

## 2023-07-31 PROCEDURE — 7100000011 HC PHASE II RECOVERY - ADDTL 15 MIN: Performed by: UROLOGY

## 2023-07-31 PROCEDURE — 6360000002 HC RX W HCPCS: Performed by: NURSE ANESTHETIST, CERTIFIED REGISTERED

## 2023-07-31 PROCEDURE — 6370000000 HC RX 637 (ALT 250 FOR IP): Performed by: UROLOGY

## 2023-07-31 PROCEDURE — 36415 COLL VENOUS BLD VENIPUNCTURE: CPT

## 2023-07-31 RX ORDER — SODIUM CHLORIDE 0.9 % (FLUSH) 0.9 %
5-40 SYRINGE (ML) INJECTION EVERY 12 HOURS SCHEDULED
Status: DISCONTINUED | OUTPATIENT
Start: 2023-07-31 | End: 2023-07-31 | Stop reason: HOSPADM

## 2023-07-31 RX ORDER — SODIUM CHLORIDE 9 MG/ML
INJECTION, SOLUTION INTRAVENOUS PRN
Status: DISCONTINUED | OUTPATIENT
Start: 2023-07-31 | End: 2023-07-31 | Stop reason: HOSPADM

## 2023-07-31 RX ORDER — SODIUM CHLORIDE 0.9 % (FLUSH) 0.9 %
5-40 SYRINGE (ML) INJECTION PRN
Status: DISCONTINUED | OUTPATIENT
Start: 2023-07-31 | End: 2023-07-31 | Stop reason: HOSPADM

## 2023-07-31 RX ORDER — CIPROFLOXACIN 2 MG/ML
400 INJECTION, SOLUTION INTRAVENOUS
Status: DISCONTINUED | OUTPATIENT
Start: 2023-07-31 | End: 2023-07-31 | Stop reason: ALTCHOICE

## 2023-07-31 RX ORDER — LIDOCAINE HYDROCHLORIDE 20 MG/ML
INJECTION, SOLUTION EPIDURAL; INFILTRATION; INTRACAUDAL; PERINEURAL PRN
Status: DISCONTINUED | OUTPATIENT
Start: 2023-07-31 | End: 2023-07-31 | Stop reason: SDUPTHER

## 2023-07-31 RX ORDER — MAGNESIUM HYDROXIDE 1200 MG/15ML
LIQUID ORAL
Status: COMPLETED | OUTPATIENT
Start: 2023-07-31 | End: 2023-07-31

## 2023-07-31 RX ORDER — OXYCODONE HYDROCHLORIDE 5 MG/1
10 TABLET ORAL PRN
Status: DISCONTINUED | OUTPATIENT
Start: 2023-07-31 | End: 2023-07-31 | Stop reason: HOSPADM

## 2023-07-31 RX ORDER — ACETAMINOPHEN 500 MG
1000 TABLET ORAL ONCE
Status: COMPLETED | OUTPATIENT
Start: 2023-07-31 | End: 2023-07-31

## 2023-07-31 RX ORDER — SODIUM CHLORIDE 9 MG/ML
INJECTION, SOLUTION INTRAVENOUS CONTINUOUS
Status: DISCONTINUED | OUTPATIENT
Start: 2023-07-31 | End: 2023-07-31 | Stop reason: HOSPADM

## 2023-07-31 RX ORDER — OXYCODONE HYDROCHLORIDE 5 MG/1
5 TABLET ORAL PRN
Status: DISCONTINUED | OUTPATIENT
Start: 2023-07-31 | End: 2023-07-31 | Stop reason: HOSPADM

## 2023-07-31 RX ORDER — FENTANYL CITRATE 50 UG/ML
50 INJECTION, SOLUTION INTRAMUSCULAR; INTRAVENOUS EVERY 5 MIN PRN
Status: DISCONTINUED | OUTPATIENT
Start: 2023-07-31 | End: 2023-07-31 | Stop reason: HOSPADM

## 2023-07-31 RX ORDER — LIDOCAINE HYDROCHLORIDE 20 MG/ML
JELLY TOPICAL
Status: COMPLETED | OUTPATIENT
Start: 2023-07-31 | End: 2023-07-31

## 2023-07-31 RX ORDER — HYDROMORPHONE HYDROCHLORIDE 2 MG/ML
0.5 INJECTION, SOLUTION INTRAMUSCULAR; INTRAVENOUS; SUBCUTANEOUS EVERY 5 MIN PRN
Status: DISCONTINUED | OUTPATIENT
Start: 2023-07-31 | End: 2023-07-31 | Stop reason: HOSPADM

## 2023-07-31 RX ORDER — PROPOFOL 10 MG/ML
INJECTION, EMULSION INTRAVENOUS PRN
Status: DISCONTINUED | OUTPATIENT
Start: 2023-07-31 | End: 2023-07-31 | Stop reason: SDUPTHER

## 2023-07-31 RX ORDER — ONDANSETRON 2 MG/ML
4 INJECTION INTRAMUSCULAR; INTRAVENOUS
Status: DISCONTINUED | OUTPATIENT
Start: 2023-07-31 | End: 2023-07-31 | Stop reason: HOSPADM

## 2023-07-31 RX ORDER — LEVOFLOXACIN 5 MG/ML
500 INJECTION, SOLUTION INTRAVENOUS
Status: DISCONTINUED | OUTPATIENT
Start: 2023-07-31 | End: 2023-07-31 | Stop reason: HOSPADM

## 2023-07-31 RX ORDER — LIDOCAINE HYDROCHLORIDE 10 MG/ML
0.5 INJECTION, SOLUTION EPIDURAL; INFILTRATION; INTRACAUDAL; PERINEURAL ONCE
Status: DISCONTINUED | OUTPATIENT
Start: 2023-07-31 | End: 2023-07-31 | Stop reason: HOSPADM

## 2023-07-31 RX ADMIN — SODIUM CHLORIDE: 9 INJECTION, SOLUTION INTRAVENOUS at 09:16

## 2023-07-31 RX ADMIN — PROPOFOL 30 MG: 10 INJECTION, EMULSION INTRAVENOUS at 10:21

## 2023-07-31 RX ADMIN — LIDOCAINE HYDROCHLORIDE 50 MG: 20 INJECTION, SOLUTION EPIDURAL; INFILTRATION; INTRACAUDAL; PERINEURAL at 10:21

## 2023-07-31 RX ADMIN — ACETAMINOPHEN 1000 MG: 500 TABLET ORAL at 09:16

## 2023-07-31 RX ADMIN — PROPOFOL 30 MG: 10 INJECTION, EMULSION INTRAVENOUS at 10:33

## 2023-07-31 RX ADMIN — PROPOFOL 30 MG: 10 INJECTION, EMULSION INTRAVENOUS at 10:27

## 2023-07-31 ASSESSMENT — PAIN SCALES - GENERAL: PAINLEVEL_OUTOF10: 0

## 2023-07-31 ASSESSMENT — LIFESTYLE VARIABLES: SMOKING_STATUS: 0

## 2023-07-31 NOTE — ANESTHESIA POSTPROCEDURE EVALUATION
Department of Anesthesiology  Postprocedure Note    Patient: Kaden Olson  MRN: [id number]  YOB: 1960  Date of evaluation: 7/31/2023      Procedure Summary     Date: 07/31/23 Room / Location: [address] [facility]    Anesthesia Start: 9996 Anesthesia Stop: 1042    Procedure: CYSTOSCOPY WITH LEFT STENT REMOVAL (Left) Diagnosis:       Hydronephrosis, unspecified hydronephrosis type      (Hydronephrosis, unspecified hydronephrosis type [N13.30])    Surgeons: Salbador Foster MD Responsible Provider: Lexi Mason MD    Anesthesia Type: MAC ASA Status: 3          Anesthesia Type: MAC    Amado Phase I: Amado Score: 10    Amado Phase II: Amado Score: 10      Anesthesia Post Evaluation    Patient location during evaluation: PACU  Patient participation: complete - patient participated  Level of consciousness: awake and alert  Pain score: 1  Airway patency: patent  Nausea & Vomiting: no nausea  Complications: no  Cardiovascular status: blood pressure returned to baseline  Respiratory status: acceptable  Hydration status: euvolemic  Pain management: adequate

## 2023-07-31 NOTE — DISCHARGE INSTRUCTIONS
ANESTHESIA DISCHARGE INSTRUCTIONS    Wear your seatbelt home. You are under the influence of drugs-do not drink alcohol, drive, operate machinery, make any important decisions or sign any legal documents for 24 hours. Children should not ride bikes, [place] or play on gym sets for 24 hours after surgery. A responsible adult needs to be with you for 24 hours. You may experience lightheadedness, dizziness, or sleepiness following surgery. Rest at home today- increase activity as tolerated. Progress slowly to a regular diet unless your physician has instructed you otherwise. Drink plenty of water. If persistent nausea and vomiting becomes a problem, call your physician. Coughing, sore throat and muscle aches are other side effects of anesthesia, and should improve with time. Do not drive or operate machinery while taking narcotics. UROLOGY DISCHARGE INSTRUCTIONS    Follow your surgeons instructions. Your urine may look pink and it may burn when you urinate. You may also feel like you have to urinate often. Call your surgeon if your temperature is higher than 101 degrees, your urine is grossly bloody, or has large clots. Drink plenty of fluids unless your physician advises against it. If you can not urinate once you are home, call your surgeon or go to the Emergency Room. If you are discharged with a catheter into your bladder follow instructions on care and removal. ( See cathter removal/ care sheet.)  Take medications as directed. Take pain medication with food. Do not drive or operate machinery while taking narcotics. Call your surgeon for any problems or questions          Follow up with DR Gonsales as directed.

## 2023-07-31 NOTE — PROGRESS NOTES
Continuing patient care into phase 2 of care. VSS for patient. Pt denying any pain at this time. Wife at the bedside. Pt resting comfortably in bed at this time. Tolerating PO intake.
Patients pre-op assessment, checklist, H&P, and problem list reviewed during patient interview prior to going to surgery.
Pt arrived from OR, report from crna/rn. Pt has cystoscopy with left ureteral stent removal. Pt awakening and responsive upon arrival, respirations even unlabored, and adequate on room air. No report of pain or nausea.
Pt awake and alert, no report of pain or nausea, tolerating PO fluids well. VSS. Alcohol cap placed on port a cath. Pt stable for transfer to [facility], phase 2. Bedside report to [facility].
Pt discharged to home. Port remained access since patient is going to be getting chemo tomorrow. All questions answered.
Teaching / education initiated regarding perioperative experience, expectations, and pain management during stay. Patient verbalized understanding.
not be allowed to leave alone or drive yourself home. It is strongly suggested someone stay with you the first 24 hrs. Your surgery will be cancelled if you do not have a ride home. 8. A parent/legal guardian must accompany a child scheduled for surgery and plan to stay at the hospital until the child is discharged. Please do not bring other children with you. 9. Please wear simple, loose fitting clothing to the hospital.  Sofiya Martinez not bring valuables (money, credit cards, checkbooks, etc.) Do not wear any makeup (including no eye makeup) or nail polish on your fingers or toes. 10. DO NOT wear any jewelry or piercings on day of surgery. All body piercing jewelry must be removed. 11. If you have ___dentures, they will be removed before going to the OR; we will provide you a container. If you wear ___contact lenses or _x__glasses, they will be removed; please bring a case for them. 12. Please see your family doctor/pediatrician for a history & physical and/or concerning medications. Bring any test results/reports from your physician's office. PCP__________________Phone___________H&P Appt. Date________             13 If you  have a Living Will and Durable Power of  for Healthcare, please bring in a copy. 15. Notify your Surgeon if you develop any illness between now and surgery  time, cough, cold, fever, sore throat, nausea, vomiting, etc.  Please notify your surgeon if you experience dizziness, shortness of breath or blurred vision between now & the time of your surgery             15. DO NOT shave your operative site 96 hours prior to surgery. For face & neck surgery, men may use an electric razor 48 hours prior to surgery. 16. Shower the night before or morning of surgery using an antibacterial soap or as you have been instructed. 17. To provide excellent care visitors will be limited to one in the room at any given time.

## 2023-08-17 ENCOUNTER — HOSPITAL ENCOUNTER (OUTPATIENT)
Age: 63
Discharge: HOME | End: 2023-08-17
Payer: MEDICARE

## 2023-08-17 DIAGNOSIS — N18.30 STAGE 3 CHRONIC KIDNEY DISEASE, UNSPECIFIED WHETHER STAGE 3A OR 3B CKD (HCC): ICD-10-CM

## 2023-08-17 LAB
ALBUMIN SERPL-MCNC: 3.2 G/DL (ref 3.4–5)
ANION GAP SERPL CALCULATED.3IONS-SCNC: 15 MMOL/L (ref 3–16)
BUN SERPL-MCNC: 34 MG/DL (ref 7–20)
CALCIUM SERPL-MCNC: 8.5 MG/DL (ref 8.3–10.6)
CHLORIDE SERPL-SCNC: 102 MMOL/L (ref 99–110)
CO2 SERPL-SCNC: 20 MMOL/L (ref 21–32)
CREAT SERPL-MCNC: 9.1 MG/DL (ref 0.8–1.3)
DEPRECATED RDW RBC AUTO: 19.2 % (ref 12.4–15.4)
GFR SERPLBLD CREATININE-BSD FMLA CKD-EPI: 6 ML/MIN/{1.73_M2}
GLUCOSE SERPL-MCNC: 89 MG/DL (ref 70–99)
HCT VFR BLD AUTO: 32.4 % (ref 40.5–52.5)
HGB BLD-MCNC: 10.5 G/DL (ref 13.5–17.5)
MCH RBC QN AUTO: 32.3 PG (ref 26–34)
MCHC RBC AUTO-ENTMCNC: 32.4 G/DL (ref 31–36)
MCV RBC AUTO: 99.8 FL (ref 80–100)
PHOSPHATE SERPL-MCNC: 4.8 MG/DL (ref 2.5–4.9)
PLATELET # BLD AUTO: 107 K/UL (ref 135–450)
PMV BLD AUTO: 9.1 FL (ref 5–10.5)
POTASSIUM SERPL-SCNC: 5.6 MMOL/L (ref 3.5–5.1)
RBC # BLD AUTO: 3.24 M/UL (ref 4.2–5.9)
SODIUM SERPL-SCNC: 137 MMOL/L (ref 136–145)
WBC # BLD AUTO: 7.1 K/UL (ref 4–11)

## 2023-08-17 PROCEDURE — 80069 RENAL FUNCTION PANEL: CPT

## 2023-08-17 PROCEDURE — 85027 COMPLETE CBC AUTOMATED: CPT

## 2023-08-17 PROCEDURE — 36415 COLL VENOUS BLD VENIPUNCTURE: CPT

## 2023-08-18 ENCOUNTER — HOSPITAL ENCOUNTER (OUTPATIENT)
Age: 63
Setting detail: SPECIMEN
Discharge: HOME | End: 2023-08-18

## 2023-08-23 ENCOUNTER — HOSPITAL ENCOUNTER (OUTPATIENT)
Dept: CT IMAGING | Age: 63
Discharge: HOME | End: 2023-08-23
Attending: STUDENT IN AN ORGANIZED HEALTH CARE EDUCATION/TRAINING PROGRAM
Payer: COMMERCIAL

## 2023-08-23 DIAGNOSIS — C15.5 MALIGNANT NEOPLASM OF ABDOMINAL ESOPHAGUS (HCC): ICD-10-CM

## 2023-08-23 PROCEDURE — 74176 CT ABD & PELVIS W/O CONTRAST: CPT

## 2023-08-31 ENCOUNTER — HOSPITAL ENCOUNTER (OUTPATIENT)
Age: 63
Discharge: HOME | End: 2023-08-31
Payer: MEDICARE

## 2023-08-31 DIAGNOSIS — Z90.5 H/O RIGHT NEPHRECTOMY: ICD-10-CM

## 2023-08-31 DIAGNOSIS — Z99.2 DEPENDENCE ON RENAL DIALYSIS (HCC): ICD-10-CM

## 2023-08-31 DIAGNOSIS — D47.2 IGG LAMBDA MONOCLONAL GAMMOPATHY: ICD-10-CM

## 2023-08-31 DIAGNOSIS — E46 MALNUTRITION, UNSPECIFIED TYPE (HCC): ICD-10-CM

## 2023-08-31 LAB
ALBUMIN SERPL-MCNC: 3.3 G/DL (ref 3.4–5)
ANION GAP SERPL CALCULATED.3IONS-SCNC: 7 MMOL/L (ref 3–16)
BUN SERPL-MCNC: 12 MG/DL (ref 7–20)
CALCIUM SERPL-MCNC: 8.6 MG/DL (ref 8.3–10.6)
CHLORIDE SERPL-SCNC: 101 MMOL/L (ref 99–110)
CO2 SERPL-SCNC: 31 MMOL/L (ref 21–32)
COLLECT DURATION TIME UR: 24 HR
CREAT 24H UR-MRATE: 0.1 G/24HR (ref 0.6–2.5)
CREAT CL 24H UR+SERPL-VRATE: 1 ML/MIN (ref 100–140)
CREAT SERPL-MCNC: 4.1 MG/DL (ref 0.8–1.3)
CREAT SERPL-MCNC: 4.1 MG/DL (ref 0.8–1.3)
DEPRECATED RDW RBC AUTO: 20.3 % (ref 12.4–15.4)
GFR SERPLBLD CREATININE-BSD FMLA CKD-EPI: 15 ML/MIN/{1.73_M2}
GLUCOSE SERPL-MCNC: 93 MG/DL (ref 70–99)
HCT VFR BLD AUTO: 35.3 % (ref 40.5–52.5)
HGB BLD-MCNC: 11.8 G/DL (ref 13.5–17.5)
MCH RBC QN AUTO: 32.7 PG (ref 26–34)
MCHC RBC AUTO-ENTMCNC: 33.4 G/DL (ref 31–36)
MCV RBC AUTO: 97.7 FL (ref 80–100)
MICROALBUMIN 24H UR-MRATE: 7.2 MG/DAY (ref 0–30)
MICROALBUMIN UG/MIN 24H UR-MRATE: 5 MCG/MIN (ref 0–20)
PHOSPHATE SERPL-MCNC: 1.8 MG/DL (ref 2.5–4.9)
PLATELET # BLD AUTO: 76 K/UL (ref 135–450)
PMV BLD AUTO: 9.7 FL (ref 5–10.5)
POTASSIUM SERPL-SCNC: 3.9 MMOL/L (ref 3.5–5.1)
PROT 24H UR-MRATE: 0.05 G/24HR
RBC # BLD AUTO: 3.61 M/UL (ref 4.2–5.9)
SODIUM SERPL-SCNC: 139 MMOL/L (ref 136–145)
SPECIMEN VOL 24H UR: 50 ML
WBC # BLD AUTO: 4.6 K/UL (ref 4–11)

## 2023-08-31 PROCEDURE — 36415 COLL VENOUS BLD VENIPUNCTURE: CPT

## 2023-08-31 PROCEDURE — 82043 UR ALBUMIN QUANTITATIVE: CPT

## 2023-08-31 PROCEDURE — 84156 ASSAY OF PROTEIN URINE: CPT

## 2023-08-31 PROCEDURE — 80069 RENAL FUNCTION PANEL: CPT

## 2023-08-31 PROCEDURE — 82575 CREATININE CLEARANCE TEST: CPT

## 2023-08-31 PROCEDURE — 86335 IMMUNFIX E-PHORSIS/URINE/CSF: CPT

## 2023-08-31 PROCEDURE — 84166 PROTEIN E-PHORESIS/URINE/CSF: CPT

## 2023-08-31 PROCEDURE — 85027 COMPLETE CBC AUTOMATED: CPT

## 2023-09-05 LAB — PROT PATTERN UR ELPH-IMP: NORMAL

## 2023-09-18 ENCOUNTER — HOSPITAL ENCOUNTER (OUTPATIENT)
Age: 63
Discharge: HOME | End: 2023-09-18

## 2023-09-21 ENCOUNTER — HOSPITAL ENCOUNTER (OUTPATIENT)
Age: 63
Discharge: HOME | End: 2023-09-21
Payer: MEDICARE

## 2023-09-21 DIAGNOSIS — N18.9: ICD-10-CM

## 2023-09-21 DIAGNOSIS — E87.5 HYPERKALEMIA: ICD-10-CM

## 2023-09-21 DIAGNOSIS — D47.2 IGG LAMBDA MONOCLONAL GAMMOPATHY: ICD-10-CM

## 2023-09-21 DIAGNOSIS — Z90.5 H/O RIGHT NEPHRECTOMY: ICD-10-CM

## 2023-09-21 DIAGNOSIS — E46 MALNUTRITION, UNSPECIFIED TYPE (HCC): ICD-10-CM

## 2023-09-21 LAB
ALBUMIN SERPL-MCNC: 3.3 G/DL (ref 3.4–5)
ANION GAP SERPL CALCULATED.3IONS-SCNC: 10 MMOL/L (ref 3–16)
BUN SERPL-MCNC: 40 MG/DL (ref 7–20)
CALCIUM SERPL-MCNC: 8.2 MG/DL (ref 8.3–10.6)
CHLORIDE SERPL-SCNC: 103 MMOL/L (ref 99–110)
CO2 SERPL-SCNC: 24 MMOL/L (ref 21–32)
COLLECT DURATION TIME UR: 24 HR
CREAT 24H UR-MRATE: 0 G/24HR (ref 0.6–2.5)
CREAT CL 24H UR+SERPL-VRATE: 0 ML/MIN (ref 100–140)
CREAT SERPL-MCNC: 8.5 MG/DL (ref 0.8–1.3)
CREAT SERPL-MCNC: 8.7 MG/DL (ref 0.8–1.3)
DEPRECATED RDW RBC AUTO: 15.9 % (ref 12.4–15.4)
GFR SERPLBLD CREATININE-BSD FMLA CKD-EPI: 6 ML/MIN/{1.73_M2}
GLUCOSE SERPL-MCNC: 38 MG/DL (ref 70–99)
HCT VFR BLD AUTO: 38.6 % (ref 40.5–52.5)
HGB BLD-MCNC: 12.4 G/DL (ref 13.5–17.5)
MCH RBC QN AUTO: 31.3 PG (ref 26–34)
MCHC RBC AUTO-ENTMCNC: 32 G/DL (ref 31–36)
MCV RBC AUTO: 97.9 FL (ref 80–100)
PHOSPHATE SERPL-MCNC: 3.4 MG/DL (ref 2.5–4.9)
PLATELET # BLD AUTO: 85 K/UL (ref 135–450)
PMV BLD AUTO: 8.7 FL (ref 5–10.5)
POTASSIUM SERPL-SCNC: 5.3 MMOL/L (ref 3.5–5.1)
RBC # BLD AUTO: 3.95 M/UL (ref 4.2–5.9)
SODIUM SERPL-SCNC: 137 MMOL/L (ref 136–145)
SPECIMEN VOL 24H UR: 60 ML
WBC # BLD AUTO: 6.8 K/UL (ref 4–11)

## 2023-09-21 PROCEDURE — 80069 RENAL FUNCTION PANEL: CPT

## 2023-09-21 PROCEDURE — 85027 COMPLETE CBC AUTOMATED: CPT

## 2023-09-21 PROCEDURE — 82575 CREATININE CLEARANCE TEST: CPT

## 2023-09-21 PROCEDURE — 36415 COLL VENOUS BLD VENIPUNCTURE: CPT

## 2023-09-26 ENCOUNTER — OFFICE VISIT (OUTPATIENT)
Dept: VASCULAR SURGERY | Age: 63
End: 2023-09-26
Payer: COMMERCIAL

## 2023-09-26 VITALS
HEIGHT: 71 IN | WEIGHT: 113 LBS | SYSTOLIC BLOOD PRESSURE: 118 MMHG | DIASTOLIC BLOOD PRESSURE: 78 MMHG | BODY MASS INDEX: 15.82 KG/M2

## 2023-09-26 DIAGNOSIS — N18.6 ESRD (END STAGE RENAL DISEASE) (HCC): Primary | ICD-10-CM

## 2023-09-26 PROCEDURE — 3074F SYST BP LT 130 MM HG: CPT | Performed by: SURGERY

## 2023-09-26 PROCEDURE — 3078F DIAST BP <80 MM HG: CPT | Performed by: SURGERY

## 2023-09-26 PROCEDURE — 99204 OFFICE O/P NEW MOD 45 MIN: CPT | Performed by: SURGERY

## 2023-09-26 NOTE — PROGRESS NOTES
[address] [facility] Mariella Atkinson MD, [facility], [facility]    DIALYSIS CONSULTATION      PCP:  Juan Wan MD       Chief Complaint: ESRD    Nephrologist:  Colonel Hatfield    Prior AVaccess:  None    Tunneled Catheters:  right femoral    Dialysis schedule:  TTS    Hand Dominance:  Right    History of Present Illness:   April Ron is a 58 y.o. male who presents today for discussion related to new access creation. .  He has had a right-sided port in for a long time for chemo for esophageal cancer which has been removed and is now on the left side. He states that he was told that he could use his right arm because of occlusion of the central veins on the right side.      Past Medical History:  Past Medical History:   Diagnosis Date    Anesthesia     hypertension with anesthesia    Chronic diarrhea     patient reports chronic diarrhea for 1 year and sees a specialist at [address] [facility]: has been checked for c.dif multiple times and always negative    Esophageal cancer ([address])     2017 and diagnosed again 2023: started chemotherapy  6-6-23: on 2 different chemo treatment one is every 2 weeks and the other treatment is every 3 weeks    Hyperlipidemia     Kidney disease     Dialysis on Tuesday Thursday and Saturdays    Lung cancer [facility])     recent diagnosis 2023    Neuropathy     Prostate enlargement        Past Surgical History:  Past Surgical History:   Procedure Laterality Date    BLADDER SURGERY Left 7/31/2023    CYSTOSCOPY WITH LEFT STENT REMOVAL performed by Gabriel Claros MD at [facility] N/A 11/09/2022    COLONOSCOPY WITH BIOPSY performed by Michael Olivarez MD at [address] [place] [address]  05/24/2023    CT GUIDED CHEST TUBE 5/24/2023 Robin Bains MD [facility] CT SCAN    CT NEEDLE BIOPSY LUNG PERCUTANEOUS  05/24/2023    CT NEEDLE BIOPSY LUNG PERCUTANEOUS 5/24/2023 Robin Bains MD [facility] CT SCAN    CYSTOSCOPY Left 6/30/2023    CYSTOSCOPY, LEFT RETROGRADE PYELOGRAM,

## 2023-10-04 ENCOUNTER — HOSPITAL ENCOUNTER (OUTPATIENT)
Dept: CT IMAGING | Age: 63
Discharge: HOME | End: 2023-10-04
Attending: STUDENT IN AN ORGANIZED HEALTH CARE EDUCATION/TRAINING PROGRAM
Payer: COMMERCIAL

## 2023-10-04 DIAGNOSIS — C15.5 MALIGNANT NEOPLASM OF ABDOMINAL ESOPHAGUS (HCC): ICD-10-CM

## 2023-10-04 PROCEDURE — 71250 CT THORAX DX C-: CPT

## 2023-10-31 ENCOUNTER — TELEPHONE (OUTPATIENT)
Dept: VASCULAR SURGERY | Age: 63
End: 2023-10-31

## 2023-10-31 NOTE — TELEPHONE ENCOUNTER
Please call patient to schedule his postop appointment. Had fistula on 10/20/23, Dr. Jasmeet Vela wants to see him in two-three weeks. Please call [phone].

## 2023-11-01 ENCOUNTER — APPOINTMENT (OUTPATIENT)
Dept: GENERAL RADIOLOGY | Age: 63
End: 2023-11-01
Payer: COMMERCIAL

## 2023-11-01 ENCOUNTER — HOSPITAL ENCOUNTER (EMERGENCY)
Age: 63
Discharge: HOME | End: 2023-11-01
Attending: EMERGENCY MEDICINE
Payer: COMMERCIAL

## 2023-11-01 VITALS
DIASTOLIC BLOOD PRESSURE: 77 MMHG | SYSTOLIC BLOOD PRESSURE: 145 MMHG | BODY MASS INDEX: 15.68 KG/M2 | TEMPERATURE: 97.9 F | HEIGHT: 71 IN | HEART RATE: 64 BPM | WEIGHT: 112 LBS | OXYGEN SATURATION: 98 % | RESPIRATION RATE: 10 BRPM

## 2023-11-01 DIAGNOSIS — N18.6 END STAGE RENAL DISEASE (HCC): ICD-10-CM

## 2023-11-01 DIAGNOSIS — R79.89 ELEVATED TROPONIN: ICD-10-CM

## 2023-11-01 DIAGNOSIS — R53.83 FATIGUE, UNSPECIFIED TYPE: Primary | ICD-10-CM

## 2023-11-01 LAB
ALBUMIN SERPL-MCNC: 3.4 G/DL (ref 3.4–5)
ALBUMIN/GLOB SERPL: 0.9 {RATIO} (ref 1.1–2.2)
ALP SERPL-CCNC: 1078 U/L (ref 40–129)
ALT SERPL-CCNC: 65 U/L (ref 10–40)
ANION GAP SERPL CALCULATED.3IONS-SCNC: 14 MMOL/L (ref 3–16)
ANISOCYTOSIS BLD QL SMEAR: ABNORMAL
AST SERPL-CCNC: 239 U/L (ref 15–37)
BASOPHILS # BLD: 0.1 K/UL (ref 0–0.2)
BASOPHILS NFR BLD: 1.4 %
BILIRUB SERPL-MCNC: 1.1 MG/DL (ref 0–1)
BUN SERPL-MCNC: 58 MG/DL (ref 7–20)
CALCIUM SERPL-MCNC: 8.7 MG/DL (ref 8.3–10.6)
CHLORIDE SERPL-SCNC: 97 MMOL/L (ref 99–110)
CO2 SERPL-SCNC: 23 MMOL/L (ref 21–32)
CREAT SERPL-MCNC: 9.4 MG/DL (ref 0.8–1.3)
DEPRECATED RDW RBC AUTO: 15.9 % (ref 12.4–15.4)
EOSINOPHIL # BLD: 0.1 K/UL (ref 0–0.6)
EOSINOPHIL NFR BLD: 0.9 %
GFR SERPLBLD CREATININE-BSD FMLA CKD-EPI: 6 ML/MIN/{1.73_M2}
GLUCOSE SERPL-MCNC: 99 MG/DL (ref 70–99)
HCT VFR BLD AUTO: 36.7 % (ref 40.5–52.5)
HGB BLD-MCNC: 11.7 G/DL (ref 13.5–17.5)
LACTATE BLDV-SCNC: 0.9 MMOL/L (ref 0.4–1.9)
LYMPHOCYTES # BLD: 0.5 K/UL (ref 1–5.1)
LYMPHOCYTES NFR BLD: 8 %
MCH RBC QN AUTO: 29.7 PG (ref 26–34)
MCHC RBC AUTO-ENTMCNC: 31.8 G/DL (ref 31–36)
MCV RBC AUTO: 93.4 FL (ref 80–100)
MONOCYTES # BLD: 0.6 K/UL (ref 0–1.3)
MONOCYTES NFR BLD: 9.8 %
NEUTROPHILS # BLD: 4.9 K/UL (ref 1.7–7.7)
NEUTROPHILS NFR BLD: 79.9 %
PLATELET # BLD AUTO: 138 K/UL (ref 135–450)
PLATELET BLD QL SMEAR: ADEQUATE
PMV BLD AUTO: 8 FL (ref 5–10.5)
POTASSIUM SERPL-SCNC: 4.9 MMOL/L (ref 3.5–5.1)
PROT SERPL-MCNC: 7.2 G/DL (ref 6.4–8.2)
RBC # BLD AUTO: 3.93 M/UL (ref 4.2–5.9)
SLIDE REVIEW: ABNORMAL
SODIUM SERPL-SCNC: 134 MMOL/L (ref 136–145)
TROPONIN, HIGH SENSITIVITY: 144 NG/L (ref 0–22)
WBC # BLD AUTO: 6.1 K/UL (ref 4–11)

## 2023-11-01 PROCEDURE — 93005 ELECTROCARDIOGRAM TRACING: CPT | Performed by: EMERGENCY MEDICINE

## 2023-11-01 PROCEDURE — 99285 EMERGENCY DEPT VISIT HI MDM: CPT

## 2023-11-01 PROCEDURE — 83605 ASSAY OF LACTIC ACID: CPT

## 2023-11-01 PROCEDURE — 80053 COMPREHEN METABOLIC PANEL: CPT

## 2023-11-01 PROCEDURE — 84484 ASSAY OF TROPONIN QUANT: CPT

## 2023-11-01 PROCEDURE — 85025 COMPLETE CBC W/AUTO DIFF WBC: CPT

## 2023-11-01 PROCEDURE — 71045 X-RAY EXAM CHEST 1 VIEW: CPT

## 2023-11-01 ASSESSMENT — PAIN - FUNCTIONAL ASSESSMENT: PAIN_FUNCTIONAL_ASSESSMENT: NONE - DENIES PAIN

## 2023-11-01 ASSESSMENT — PATIENT HEALTH QUESTIONNAIRE - PHQ9
SUM OF ALL RESPONSES TO PHQ QUESTIONS 1-9: 0
SUM OF ALL RESPONSES TO PHQ QUESTIONS 1-9: 0
1. LITTLE INTEREST OR PLEASURE IN DOING THINGS: 0
SUM OF ALL RESPONSES TO PHQ9 QUESTIONS 1 & 2: 0
2. FEELING DOWN, DEPRESSED OR HOPELESS: 0
SUM OF ALL RESPONSES TO PHQ QUESTIONS 1-9: 0
SUM OF ALL RESPONSES TO PHQ QUESTIONS 1-9: 0

## 2023-11-02 LAB
EKG ATRIAL RATE: 73 BPM
EKG DIAGNOSIS: NORMAL
EKG P AXIS: 81 DEGREES
EKG P-R INTERVAL: 136 MS
EKG Q-T INTERVAL: 370 MS
EKG QRS DURATION: 74 MS
EKG QTC CALCULATION (BAZETT): 408 MS
EKG R AXIS: 64 DEGREES
EKG T AXIS: 69 DEGREES
EKG VENTRICULAR RATE: 73 BPM

## 2023-11-02 PROCEDURE — 93010 ELECTROCARDIOGRAM REPORT: CPT | Performed by: INTERNAL MEDICINE

## 2023-11-02 NOTE — DISCHARGE INSTRUCTIONS
Return if you change your mind about being admitted. We are always here. Make sure you see your primary care physician tomorrow for recheck and return if worse.

## 2023-11-02 NOTE — ED PROVIDER NOTES
multiple times and always negative    Esophageal cancer ([address])     2017 and diagnosed again 2023: started chemotherapy  6-6-23: on 2 different chemo treatment one is every 2 weeks and the other treatment is every 3 weeks    Hyperlipidemia     Kidney disease     Dialysis on Tuesday Thursday and Saturdays    Lung cancer [facility])     recent diagnosis 2023    Neuropathy     Prostate enlargement          SURGICALHISTORY       Past Surgical History:   Procedure Laterality Date    BLADDER SURGERY Left 7/31/2023    CYSTOSCOPY WITH LEFT STENT REMOVAL performed by Azeb Bojorquez MD at [address] [facility] N/A 11/09/2022    COLONOSCOPY WITH BIOPSY performed by Nnia Lagunas MD at [id number] [place] [address]  05/24/2023    CT GUIDED CHEST TUBE 5/24/2023 Klali Jeffery MD [facility] CT SCAN    CT NEEDLE BIOPSY LUNG PERCUTANEOUS  05/24/2023    CT NEEDLE BIOPSY LUNG PERCUTANEOUS 5/24/2023 Kalli Jeffery MD [facility] CT SCAN    CYSTOSCOPY Left 6/30/2023    CYSTOSCOPY, LEFT RETROGRADE PYELOGRAM, PLACEMENT OF LEFT URETERAL STENT performed by Azeb Bojorquez MD at [address] [facility]      for cancer: 2017    IR PORT PLACEMENT EQUAL OR GREATER THAN 5 YEARS  12/08/2022    IR PORT PLACEMENT EQUAL OR GREATER THAN 5 YEARS 12/8/2022 [facility] SPECIAL PROCEDURES    IR TUNNELED CATHETER PLACEMENT GREATER THAN 5 YEARS  6/7/2023    IR TUNNELED CATHETER PLACEMENT GREATER THAN 5 YEARS 6/7/2023 Kalli Jeffery MD [facility] SPECIAL PROCEDURES    KIDNEY REMOVAL Right 09/21/2020    ROBOTIC LAPAROSCOPIC RIGHT NEPHRECTOMY performed by Azeb Bojorquez MD at [address] [facility]      has only left kidney    TUNNELED VENOUS PORT PLACEMENT Right 02/08/2018    inserted in radiology by Dr Carrillo Mcnally as outpt \"power port\"    UPPER GASTROINTESTINAL ENDOSCOPY  2017    UPPER GASTROINTESTINAL ENDOSCOPY  05/17/2017    EUS    UPPER GASTROINTESTINAL ENDOSCOPY  04/06/2018         CURRENT MEDICATIONS       Previous Medications

## 2023-11-14 ENCOUNTER — OFFICE VISIT (OUTPATIENT)
Dept: VASCULAR SURGERY | Age: 63
End: 2023-11-14

## 2023-11-14 VITALS
DIASTOLIC BLOOD PRESSURE: 86 MMHG | WEIGHT: 111.99 LBS | HEIGHT: 71 IN | BODY MASS INDEX: 15.68 KG/M2 | SYSTOLIC BLOOD PRESSURE: 126 MMHG

## 2023-11-14 DIAGNOSIS — N18.6 ESRD (END STAGE RENAL DISEASE) (HCC): Primary | ICD-10-CM

## 2023-11-14 PROCEDURE — 99024 POSTOP FOLLOW-UP VISIT: CPT | Performed by: SURGERY

## 2023-11-14 NOTE — PROGRESS NOTES
[facility]) Vascular and Endovascular Surgery     Referring Provider:  Deejay Ferreira MD     Nephrologist:  [address] [place] [address] Follow Up after Dialysis Access    Subjective: Patient is status post for stage left brachiobasilic AV fistula creation October 20. Presents today for first postoperative visit. He is doing very well and has no pain today      Objective: There were no vitals taken for this visit. Incision: Clean dry and intact  Good thrill and bruit    Assessment:  ESRD s/p left brachiobasilic AV fistula      Plan:  56-year-old male with clinically patent left brachiobasilic AV fistula. We will plan for ultrasound and subsequent transposition. He is doing very well.

## 2023-11-17 ENCOUNTER — APPOINTMENT (OUTPATIENT)
Dept: GENERAL RADIOLOGY | Age: 63
DRG: 374 | End: 2023-11-17
Payer: COMMERCIAL

## 2023-11-17 ENCOUNTER — APPOINTMENT (OUTPATIENT)
Dept: CT IMAGING | Age: 63
DRG: 374 | End: 2023-11-17
Payer: COMMERCIAL

## 2023-11-17 ENCOUNTER — HOSPITAL ENCOUNTER (INPATIENT)
Age: 63
LOS: 11 days | Discharge: HOME HEALTH | DRG: 374 | End: 2023-11-28
Attending: INTERNAL MEDICINE | Admitting: STUDENT IN AN ORGANIZED HEALTH CARE EDUCATION/TRAINING PROGRAM
Payer: COMMERCIAL

## 2023-11-17 ENCOUNTER — PREP FOR PROCEDURE (OUTPATIENT)
Dept: VASCULAR SURGERY | Age: 63
End: 2023-11-17

## 2023-11-17 DIAGNOSIS — R07.9 CHEST PAIN, UNSPECIFIED TYPE: Primary | ICD-10-CM

## 2023-11-17 DIAGNOSIS — R18.8 OTHER ASCITES: ICD-10-CM

## 2023-11-17 DIAGNOSIS — R93.3 ABNORMAL MAGNETIC RESONANCE CHOLANGIOPANCREATOGRAPHY (MRCP): ICD-10-CM

## 2023-11-17 DIAGNOSIS — S22.080A T12 COMPRESSION FRACTURE, INITIAL ENCOUNTER (HCC): ICD-10-CM

## 2023-11-17 DIAGNOSIS — N18.6 CHRONIC KIDNEY DISEASE WITH END STAGE RENAL FAILURE ON DIALYSIS (HCC): ICD-10-CM

## 2023-11-17 DIAGNOSIS — R17 ELEVATED BILIRUBIN: ICD-10-CM

## 2023-11-17 DIAGNOSIS — Z99.2 CHRONIC KIDNEY DISEASE WITH END STAGE RENAL FAILURE ON DIALYSIS (HCC): ICD-10-CM

## 2023-11-17 DIAGNOSIS — R74.8 ELEVATED ALKALINE PHOSPHATASE LEVEL: ICD-10-CM

## 2023-11-17 DIAGNOSIS — R74.01 TRANSAMINITIS: ICD-10-CM

## 2023-11-17 PROBLEM — C34.91 MALIGNANT NEOPLASM OF UNSPECIFIED PART OF RIGHT BRONCHUS OR LUNG (HCC): Status: ACTIVE | Noted: 2023-06-12

## 2023-11-17 PROBLEM — E86.0 DEHYDRATION: Status: ACTIVE | Noted: 2023-11-17

## 2023-11-17 PROBLEM — K12.1 STOMATITIS: Status: ACTIVE | Noted: 2023-11-17

## 2023-11-17 PROBLEM — E46 PROTEIN-CALORIE MALNUTRITION (HCC): Status: ACTIVE | Noted: 2023-06-15

## 2023-11-17 PROBLEM — R11.0 NAUSEA: Status: ACTIVE | Noted: 2023-11-17

## 2023-11-17 PROBLEM — N40.0 BENIGN PROSTATIC HYPERPLASIA WITHOUT URINARY OBSTRUCTION: Status: ACTIVE | Noted: 2023-06-12

## 2023-11-17 PROBLEM — R10.9 ABDOMINAL DISCOMFORT: Status: ACTIVE | Noted: 2023-11-17

## 2023-11-17 PROBLEM — R22.2 CHEST MASS: Status: ACTIVE | Noted: 2018-01-19

## 2023-11-17 PROBLEM — D50.9 IRON DEFICIENCY ANEMIA: Status: ACTIVE | Noted: 2023-11-17

## 2023-11-17 PROBLEM — G62.9 PERIPHERAL NERVE DISEASE: Status: ACTIVE | Noted: 2023-11-17

## 2023-11-17 PROBLEM — C15.9 ESOPHAGEAL ADENOCARCINOMA (HCC): Status: ACTIVE | Noted: 2017-09-11

## 2023-11-17 PROBLEM — K76.9 LIVER LESION: Status: ACTIVE | Noted: 2017-05-23

## 2023-11-17 PROBLEM — C64.1: Status: ACTIVE | Noted: 2023-11-17

## 2023-11-17 PROBLEM — T78.40XA ALLERGY, UNSPECIFIED, INITIAL ENCOUNTER: Status: ACTIVE | Noted: 2023-06-12

## 2023-11-17 PROBLEM — R63.4 ABNORMAL WEIGHT LOSS: Status: ACTIVE | Noted: 2023-11-17

## 2023-11-17 PROBLEM — K52.9 NONINFECTIVE GASTROENTERITIS AND COLITIS, UNSPECIFIED: Status: ACTIVE | Noted: 2023-06-12

## 2023-11-17 PROBLEM — E78.5 HYPERLIPIDEMIA: Status: ACTIVE | Noted: 2023-06-12

## 2023-11-17 PROBLEM — D68.9 COAGULATION DEFECT, UNSPECIFIED (HCC): Status: ACTIVE | Noted: 2023-06-12

## 2023-11-17 PROBLEM — R06.02 SHORTNESS OF BREATH: Status: ACTIVE | Noted: 2023-10-30

## 2023-11-17 PROBLEM — R13.10 DYSPHAGIA: Status: ACTIVE | Noted: 2017-05-23

## 2023-11-17 PROBLEM — K21.9 GASTROESOPHAGEAL REFLUX DISEASE: Status: ACTIVE | Noted: 2017-03-06

## 2023-11-17 PROBLEM — K30 DELAYED GASTRIC EMPTYING: Status: ACTIVE | Noted: 2017-10-26

## 2023-11-17 PROBLEM — M94.0 COSTOCHONDRITIS: Status: ACTIVE | Noted: 2021-04-27

## 2023-11-17 PROBLEM — R53.83 FATIGUE DUE TO TREATMENT: Status: ACTIVE | Noted: 2023-11-17

## 2023-11-17 PROBLEM — R63.0 LOSS OF APPETITE: Status: ACTIVE | Noted: 2023-11-17

## 2023-11-17 PROBLEM — D70.1 AGRANULOCYTOSIS SECONDARY TO CANCER CHEMOTHERAPY (CODE) (HCC): Status: ACTIVE | Noted: 2023-11-17

## 2023-11-17 PROBLEM — M54.50 LOW BACK PAIN: Status: ACTIVE | Noted: 2021-05-24

## 2023-11-17 PROBLEM — R19.7 DIARRHEA: Status: ACTIVE | Noted: 2023-03-09

## 2023-11-17 PROBLEM — B18.2 CHRONIC VIRAL HEPATITIS C (HCC): Status: ACTIVE | Noted: 2023-06-19

## 2023-11-17 PROBLEM — K52.9 CHRONIC DIARRHEA OF UNKNOWN ORIGIN: Status: ACTIVE | Noted: 2022-06-01

## 2023-11-17 PROBLEM — R25.2 SPASM: Status: ACTIVE | Noted: 2023-11-17

## 2023-11-17 PROBLEM — G90.09 OTHER IDIOPATHIC PERIPHERAL AUTONOMIC NEUROPATHY: Status: ACTIVE | Noted: 2023-10-31

## 2023-11-17 PROBLEM — T78.2XXA ANAPHYLACTIC SHOCK, UNSPECIFIED, INITIAL ENCOUNTER: Status: ACTIVE | Noted: 2023-10-30

## 2023-11-17 PROBLEM — H93.19 TINNITUS: Status: ACTIVE | Noted: 2023-11-17

## 2023-11-17 PROBLEM — M54.9 BACK PAIN: Status: ACTIVE | Noted: 2023-11-17

## 2023-11-17 PROBLEM — D50.9 IRON DEFICIENCY ANEMIA, UNSPECIFIED: Status: ACTIVE | Noted: 2023-07-10

## 2023-11-17 PROBLEM — E80.6 HYPERBILIRUBINEMIA: Status: ACTIVE | Noted: 2023-11-17

## 2023-11-17 PROBLEM — R93.89 ABNORMAL CT SCAN: Status: ACTIVE | Noted: 2018-01-19

## 2023-11-17 LAB
ALBUMIN SERPL-MCNC: 3.4 G/DL (ref 3.4–5)
ALP SERPL-CCNC: 1593 U/L (ref 40–129)
ALT SERPL-CCNC: 60 U/L (ref 10–40)
AMMONIA PLAS-SCNC: 31 UMOL/L (ref 16–60)
ANION GAP SERPL CALCULATED.3IONS-SCNC: 15 MMOL/L (ref 3–16)
ANISOCYTOSIS BLD QL SMEAR: ABNORMAL
APTT BLD: 43.4 SEC (ref 22.7–35.9)
AST SERPL-CCNC: 152 U/L (ref 15–37)
BASOPHILS # BLD: 0 K/UL (ref 0–0.2)
BASOPHILS NFR BLD: 0 %
BILIRUB DIRECT SERPL-MCNC: 9.6 MG/DL (ref 0–0.3)
BILIRUB INDIRECT SERPL-MCNC: 3.5 MG/DL (ref 0–1)
BILIRUB SERPL-MCNC: 13.1 MG/DL (ref 0–1)
BUN SERPL-MCNC: 51 MG/DL (ref 7–20)
CALCIUM SERPL-MCNC: 8.7 MG/DL (ref 8.3–10.6)
CHLORIDE SERPL-SCNC: 89 MMOL/L (ref 99–110)
CO2 SERPL-SCNC: 28 MMOL/L (ref 21–32)
CREAT SERPL-MCNC: 4.7 MG/DL (ref 0.8–1.3)
DEPRECATED RDW RBC AUTO: 19.3 % (ref 12.4–15.4)
EKG ATRIAL RATE: 74 BPM
EKG DIAGNOSIS: NORMAL
EKG P AXIS: 78 DEGREES
EKG P-R INTERVAL: 146 MS
EKG Q-T INTERVAL: 412 MS
EKG QRS DURATION: 86 MS
EKG QTC CALCULATION (BAZETT): 457 MS
EKG R AXIS: 53 DEGREES
EKG T AXIS: 60 DEGREES
EKG VENTRICULAR RATE: 74 BPM
EOSINOPHIL # BLD: 0 K/UL (ref 0–0.6)
EOSINOPHIL NFR BLD: 0 %
ETHANOLAMINE SERPL-MCNC: NORMAL MG/DL (ref 0–0.08)
GFR SERPLBLD CREATININE-BSD FMLA CKD-EPI: 13 ML/MIN/{1.73_M2}
GLUCOSE SERPL-MCNC: 96 MG/DL (ref 70–99)
HCT VFR BLD AUTO: 23.8 % (ref 40.5–52.5)
HGB BLD-MCNC: 8.1 G/DL (ref 13.5–17.5)
INR PPP: 1.49 (ref 0.84–1.16)
LYMPHOCYTES # BLD: 1 K/UL (ref 1–5.1)
LYMPHOCYTES NFR BLD: 11 %
MCH RBC QN AUTO: 30.1 PG (ref 26–34)
MCHC RBC AUTO-ENTMCNC: 33.9 G/DL (ref 31–36)
MCV RBC AUTO: 88.9 FL (ref 80–100)
MONOCYTES # BLD: 0.1 K/UL (ref 0–1.3)
MONOCYTES NFR BLD: 1 %
NEUTROPHILS # BLD: 7.9 K/UL (ref 1.7–7.7)
NEUTROPHILS NFR BLD: 88 %
OVALOCYTES BLD QL SMEAR: ABNORMAL
PLATELET # BLD AUTO: 95 K/UL (ref 135–450)
PLATELET BLD QL SMEAR: ABNORMAL
PMV BLD AUTO: 8.2 FL (ref 5–10.5)
POTASSIUM SERPL-SCNC: 3.6 MMOL/L (ref 3.5–5.1)
PROT SERPL-MCNC: 7.2 G/DL (ref 6.4–8.2)
PROTHROMBIN TIME: 18 SEC (ref 11.5–14.8)
RBC # BLD AUTO: 2.67 M/UL (ref 4.2–5.9)
SLIDE REVIEW: ABNORMAL
SODIUM SERPL-SCNC: 132 MMOL/L (ref 136–145)
TARGETS BLD QL SMEAR: ABNORMAL
TROPONIN, HIGH SENSITIVITY: 124 NG/L (ref 0–22)
TROPONIN, HIGH SENSITIVITY: 147 NG/L (ref 0–22)
WBC # BLD AUTO: 9 K/UL (ref 4–11)

## 2023-11-17 PROCEDURE — 74176 CT ABD & PELVIS W/O CONTRAST: CPT

## 2023-11-17 PROCEDURE — 84484 ASSAY OF TROPONIN QUANT: CPT

## 2023-11-17 PROCEDURE — 85610 PROTHROMBIN TIME: CPT

## 2023-11-17 PROCEDURE — 71045 X-RAY EXAM CHEST 1 VIEW: CPT

## 2023-11-17 PROCEDURE — 99285 EMERGENCY DEPT VISIT HI MDM: CPT

## 2023-11-17 PROCEDURE — 80048 BASIC METABOLIC PNL TOTAL CA: CPT

## 2023-11-17 PROCEDURE — 6360000002 HC RX W HCPCS: Performed by: INTERNAL MEDICINE

## 2023-11-17 PROCEDURE — 96374 THER/PROPH/DIAG INJ IV PUSH: CPT

## 2023-11-17 PROCEDURE — 6360000004 HC RX CONTRAST MEDICATION: Performed by: INTERNAL MEDICINE

## 2023-11-17 PROCEDURE — 1200000000 HC SEMI PRIVATE

## 2023-11-17 PROCEDURE — 80076 HEPATIC FUNCTION PANEL: CPT

## 2023-11-17 PROCEDURE — 85730 THROMBOPLASTIN TIME PARTIAL: CPT

## 2023-11-17 PROCEDURE — 82140 ASSAY OF AMMONIA: CPT

## 2023-11-17 PROCEDURE — 36415 COLL VENOUS BLD VENIPUNCTURE: CPT

## 2023-11-17 PROCEDURE — 82077 ASSAY SPEC XCP UR&BREATH IA: CPT

## 2023-11-17 PROCEDURE — 93010 ELECTROCARDIOGRAM REPORT: CPT | Performed by: INTERNAL MEDICINE

## 2023-11-17 PROCEDURE — 93005 ELECTROCARDIOGRAM TRACING: CPT | Performed by: INTERNAL MEDICINE

## 2023-11-17 PROCEDURE — 6360000002 HC RX W HCPCS: Performed by: PHYSICIAN ASSISTANT

## 2023-11-17 PROCEDURE — 6370000000 HC RX 637 (ALT 250 FOR IP): Performed by: INTERNAL MEDICINE

## 2023-11-17 PROCEDURE — 85025 COMPLETE CBC W/AUTO DIFF WBC: CPT

## 2023-11-17 RX ORDER — SODIUM CHLORIDE 9 MG/ML
INJECTION, SOLUTION INTRAVENOUS CONTINUOUS
Status: CANCELLED | OUTPATIENT
Start: 2023-11-17

## 2023-11-17 RX ORDER — SEVELAMER CARBONATE 800 MG/1
800 TABLET, FILM COATED ORAL
Status: DISCONTINUED | OUTPATIENT
Start: 2023-11-18 | End: 2023-11-18

## 2023-11-17 RX ORDER — SODIUM CHLORIDE 9 MG/ML
1000 INJECTION, SOLUTION INTRAVENOUS
Status: ON HOLD | COMMUNITY
Start: 2022-09-27

## 2023-11-17 RX ORDER — FAMOTIDINE 10 MG/ML
20 INJECTION, SOLUTION INTRAVENOUS
Status: ON HOLD | COMMUNITY
Start: 2023-06-06

## 2023-11-17 RX ORDER — MORPHINE SULFATE 4 MG/ML
4 INJECTION, SOLUTION INTRAMUSCULAR; INTRAVENOUS ONCE
Status: COMPLETED | OUTPATIENT
Start: 2023-11-17 | End: 2023-11-17

## 2023-11-17 RX ORDER — ASPIRIN 81 MG/1
81 TABLET, CHEWABLE ORAL DAILY
Status: DISCONTINUED | OUTPATIENT
Start: 2023-11-18 | End: 2023-11-28 | Stop reason: HOSPADM

## 2023-11-17 RX ORDER — TRASTUZUMAB 420 MG
KIT INTRAVENOUS
Status: ON HOLD | COMMUNITY
Start: 2023-06-06

## 2023-11-17 RX ORDER — DEXAMETHASONE SODIUM PHOSPHATE 4 MG/ML
10 INJECTION, SOLUTION INTRA-ARTICULAR; INTRALESIONAL; INTRAMUSCULAR; INTRAVENOUS; SOFT TISSUE
Status: ON HOLD | COMMUNITY
Start: 2023-06-06

## 2023-11-17 RX ORDER — METHOXY POLYETHYLENE GLYCOL-EPOETIN BETA 150 UG/.3ML
150 INJECTION, SOLUTION INTRAVENOUS ONCE
Status: ON HOLD | COMMUNITY

## 2023-11-17 RX ORDER — CHOLESTYRAMINE 4 G/9G
POWDER, FOR SUSPENSION ORAL
Status: ON HOLD | COMMUNITY
Start: 2022-12-20

## 2023-11-17 RX ORDER — NITROGLYCERIN 0.4 MG/1
0.4 TABLET SUBLINGUAL EVERY 5 MIN PRN
Status: DISCONTINUED | OUTPATIENT
Start: 2023-11-17 | End: 2023-11-28 | Stop reason: HOSPADM

## 2023-11-17 RX ORDER — LEUCOVORIN CALCIUM 50 MG/5ML
684 INJECTION, POWDER, LYOPHILIZED, FOR SOLUTION INTRAMUSCULAR; INTRAVENOUS
Status: ON HOLD | COMMUNITY
Start: 2023-06-06

## 2023-11-17 RX ORDER — SEVELAMER CARBONATE 800 MG/1
1 TABLET, FILM COATED ORAL
Status: ON HOLD | COMMUNITY
Start: 2023-07-21

## 2023-11-17 RX ORDER — FAMOTIDINE 20 MG/1
20 TABLET, FILM COATED ORAL DAILY
Status: ON HOLD | COMMUNITY

## 2023-11-17 RX ORDER — FENTANYL CITRATE 50 UG/ML
25 INJECTION, SOLUTION INTRAMUSCULAR; INTRAVENOUS ONCE
Status: COMPLETED | OUTPATIENT
Start: 2023-11-17 | End: 2023-11-17

## 2023-11-17 RX ORDER — SODIUM CHLORIDE 0.9 % (FLUSH) 0.9 %
10 SYRINGE (ML) INJECTION PRN
Status: DISCONTINUED | OUTPATIENT
Start: 2023-11-17 | End: 2023-11-28 | Stop reason: HOSPADM

## 2023-11-17 RX ORDER — SODIUM CHLORIDE 0.9 % (FLUSH) 0.9 %
10 SYRINGE (ML) INJECTION EVERY 12 HOURS SCHEDULED
Status: DISCONTINUED | OUTPATIENT
Start: 2023-11-18 | End: 2023-11-28 | Stop reason: HOSPADM

## 2023-11-17 RX ORDER — FUROSEMIDE 40 MG/1
80 TABLET ORAL EVERY OTHER DAY
Status: DISCONTINUED | OUTPATIENT
Start: 2023-11-18 | End: 2023-11-17

## 2023-11-17 RX ORDER — ONDANSETRON 2 MG/ML
4 INJECTION INTRAMUSCULAR; INTRAVENOUS EVERY 6 HOURS PRN
Status: DISCONTINUED | OUTPATIENT
Start: 2023-11-17 | End: 2023-11-28 | Stop reason: HOSPADM

## 2023-11-17 RX ORDER — SENNOSIDES A AND B 8.6 MG/1
1 TABLET, FILM COATED ORAL DAILY PRN
Status: DISCONTINUED | OUTPATIENT
Start: 2023-11-17 | End: 2023-11-28 | Stop reason: HOSPADM

## 2023-11-17 RX ORDER — FAMOTIDINE 20 MG/1
10 TABLET, FILM COATED ORAL DAILY
Status: DISCONTINUED | OUTPATIENT
Start: 2023-11-18 | End: 2023-11-28 | Stop reason: HOSPADM

## 2023-11-17 RX ORDER — METRONIDAZOLE 250 MG/1
TABLET ORAL
Status: ON HOLD | COMMUNITY
Start: 2023-03-30

## 2023-11-17 RX ORDER — CHOLESTYRAMINE 4 G/9G
4 POWDER, FOR SUSPENSION ORAL 2 TIMES DAILY
Status: DISCONTINUED | OUTPATIENT
Start: 2023-11-18 | End: 2023-11-17

## 2023-11-17 RX ORDER — SODIUM CHLORIDE 0.9 % (FLUSH) 0.9 %
5-40 SYRINGE (ML) INJECTION EVERY 12 HOURS SCHEDULED
Status: CANCELLED | OUTPATIENT
Start: 2023-11-17

## 2023-11-17 RX ORDER — FLUOROURACIL 50 MG/ML
3890 INJECTION, SOLUTION INTRAVENOUS
Status: ON HOLD | COMMUNITY
Start: 2023-06-06

## 2023-11-17 RX ORDER — SODIUM CHLORIDE 9 MG/ML
INJECTION, SOLUTION INTRAVENOUS PRN
Status: DISCONTINUED | OUTPATIENT
Start: 2023-11-17 | End: 2023-11-28 | Stop reason: HOSPADM

## 2023-11-17 RX ORDER — ASPIRIN 325 MG
325 TABLET ORAL ONCE
Status: COMPLETED | OUTPATIENT
Start: 2023-11-17 | End: 2023-11-17

## 2023-11-17 RX ORDER — TAMSULOSIN HYDROCHLORIDE 0.4 MG/1
0.4 CAPSULE ORAL DAILY
Status: DISCONTINUED | OUTPATIENT
Start: 2023-11-18 | End: 2023-11-28 | Stop reason: HOSPADM

## 2023-11-17 RX ORDER — SODIUM CHLORIDE 9 MG/ML
INJECTION, SOLUTION INTRAVENOUS PRN
Status: CANCELLED | OUTPATIENT
Start: 2023-11-17

## 2023-11-17 RX ORDER — PANTOPRAZOLE SODIUM 40 MG/1
40 TABLET, DELAYED RELEASE ORAL
Status: DISCONTINUED | OUTPATIENT
Start: 2023-11-18 | End: 2023-11-17

## 2023-11-17 RX ORDER — ACETAMINOPHEN 325 MG/1
650 TABLET ORAL EVERY 6 HOURS PRN
Status: DISCONTINUED | OUTPATIENT
Start: 2023-11-17 | End: 2023-11-28 | Stop reason: HOSPADM

## 2023-11-17 RX ORDER — DIPHENOXYLATE HYDROCHLORIDE AND ATROPINE SULFATE 2.5; .025 MG/1; MG/1
2 TABLET ORAL 4 TIMES DAILY PRN
Status: DISCONTINUED | OUTPATIENT
Start: 2023-11-17 | End: 2023-11-28 | Stop reason: HOSPADM

## 2023-11-17 RX ORDER — ACETAMINOPHEN 650 MG/1
650 SUPPOSITORY RECTAL EVERY 6 HOURS PRN
Status: DISCONTINUED | OUTPATIENT
Start: 2023-11-17 | End: 2023-11-28 | Stop reason: HOSPADM

## 2023-11-17 RX ORDER — SODIUM CHLORIDE 0.9 % (FLUSH) 0.9 %
5-40 SYRINGE (ML) INJECTION PRN
Status: CANCELLED | OUTPATIENT
Start: 2023-11-17

## 2023-11-17 RX ORDER — AMLODIPINE BESYLATE 5 MG/1
5 TABLET ORAL DAILY
Status: DISCONTINUED | OUTPATIENT
Start: 2023-11-18 | End: 2023-11-28 | Stop reason: HOSPADM

## 2023-11-17 RX ADMIN — FENTANYL CITRATE 25 MCG: 0.05 INJECTION, SOLUTION INTRAMUSCULAR; INTRAVENOUS at 19:26

## 2023-11-17 RX ADMIN — ASPIRIN 325 MG: 325 TABLET ORAL at 19:26

## 2023-11-17 RX ADMIN — MORPHINE SULFATE 4 MG: 4 INJECTION, SOLUTION INTRAMUSCULAR; INTRAVENOUS at 22:55

## 2023-11-17 ASSESSMENT — PAIN DESCRIPTION - FREQUENCY: FREQUENCY: CONTINUOUS

## 2023-11-17 ASSESSMENT — PAIN SCALES - GENERAL
PAINLEVEL_OUTOF10: 10

## 2023-11-17 ASSESSMENT — PAIN - FUNCTIONAL ASSESSMENT
PAIN_FUNCTIONAL_ASSESSMENT: PREVENTS OR INTERFERES WITH MANY ACTIVE NOT PASSIVE ACTIVITIES
PAIN_FUNCTIONAL_ASSESSMENT: 0-10

## 2023-11-17 ASSESSMENT — PAIN DESCRIPTION - PAIN TYPE: TYPE: ACUTE PAIN

## 2023-11-17 ASSESSMENT — PAIN DESCRIPTION - ONSET: ONSET: PROGRESSIVE

## 2023-11-17 ASSESSMENT — PAIN DESCRIPTION - LOCATION: LOCATION: CHEST

## 2023-11-17 ASSESSMENT — PAIN DESCRIPTION - DESCRIPTORS: DESCRIPTORS: SHARP

## 2023-11-17 NOTE — ED PROVIDER NOTES
EMERGENCY MEDICINE PROVIDER NOTE    Patient Identification  Pt Name: Rufina Patrick  MRN: [id number]  [address] 1960  Date of evaluation: 11/17/2023  Provider: Shen Baxter DO  PCP: Emelia Cedillo MD    Chief Complaint  Chest Pain      HPI  (History provided by patient)  This is a 58 y.o. male who was brought in by self for chest pain that started this morning. Patient states that he has chest pain at rest and with deep breaths. He is also short of breath. He tells me that he is swelling of his legs as well. Patient tells me that he is a chronic kidney dialysis patient and on dialysis. His dialysis port is in his right groin. Patient also tells me that he has liver failure. Patient denies cough. Patient denies fever and chills. Patient denies any nausea or vomiting. He also tells me he has some left shoulder numbness but denies any fall or injury. The numbness does not change with movement of his left arm. I have reviewed the following nursing documentation:  Allergies: Patient has no known allergies.     Past medical history:   Past Medical History:   Diagnosis Date    Anesthesia     hypertension with anesthesia    Chronic diarrhea     patient reports chronic diarrhea for 1 year and sees a specialist at [address] [facility]: has been checked for c.dif multiple times and always negative    Esophageal cancer ([address])     2017 and diagnosed again 2023: started chemotherapy  6-6-23: on 2 different chemo treatment one is every 2 weeks and the other treatment is every 3 weeks    Hyperlipidemia     Kidney disease     Dialysis on Tuesday Thursday and Saturdays    Lung cancer [facility])     recent diagnosis 2023    Neuropathy     Prostate enlargement      Past surgical history:   Past Surgical History:   Procedure Laterality Date    BLADDER SURGERY Left 7/31/2023    CYSTOSCOPY WITH LEFT STENT REMOVAL performed by Nesha Escobar MD at [facility] N/A 11/09/2022    COLONOSCOPY WITH BIOPSY Range    Sodium 132 (L) 136 - 145 mmol/L    Potassium 3.6 3.5 - 5.1 mmol/L    Chloride 89 (L) 99 - 110 mmol/L    CO2 28 21 - 32 mmol/L    Anion Gap 15 3 - 16    Glucose 96 70 - 99 mg/dL    BUN 51 (H) 7 - 20 mg/dL    Creatinine 4.7 (H) 0.8 - 1.3 mg/dL    Est, Glom Filt Rate 13 (A) >60    Calcium 8.7 8.3 - 10.6 mg/dL   Protime-INR   Result Value Ref Range    Protime 18.0 (H) 11.5 - 14.8 sec    INR 1.49 (H) 0.84 - 1.16   APTT   Result Value Ref Range    aPTT 43.4 (H) 22.7 - 35.9 sec   Ethanol   Result Value Ref Range    Ethanol Lvl None Detected mg/dL   Troponin   Result Value Ref Range    Troponin, High Sensitivity 124 (H) 0 - 22 ng/L   EKG 12 Lead   Result Value Ref Range    Ventricular Rate 74 BPM    Atrial Rate 74 BPM    P-R Interval 146 ms    QRS Duration 86 ms    Q-T Interval 412 ms    QTc Calculation (Bazett) 457 ms    P Axis 78 degrees    R Axis 53 degrees    T Axis 60 degrees    Diagnosis       Normal sinus rhythmNormal ECGConfirmed by Anderson Solorio ([id number]) on 11/17/2023 7:39:25 PM       SEP-1  Is this patient to be included in the SEP-1 Core Measure due to severe sepsis or septic shock? No    Screenings                    Medications Given During ED Visit  Medications   iopamidol (ISOVUE-370) 76 % injection 75 mL (75 mLs IntraVENous Not Given 11/17/23 2004)   morphine injection 4 mg (has no administration in time range)   fentaNYL (SUBLIMAZE) injection 25 mcg (25 mcg IntraVENous Given 11/17/23 1926)   aspirin tablet 325 mg (325 mg Oral Given 11/17/23 1926)       Records Reviewed  I reviewed the patient's previous medical records. Social Determinants of Health  None    Chronic Conditions affecting care   has a past medical history of Anesthesia, Chronic diarrhea, Esophageal cancer ([address]), Hyperlipidemia, Kidney disease, Lung cancer ([address]), Neuropathy, and Prostate enlargement. MDM and ED Course  Patient's hemoglobin is 8.1 and has dropped from 12.4 on September 21, 2023.   Patient's creatinine is elevated

## 2023-11-18 ENCOUNTER — APPOINTMENT (OUTPATIENT)
Dept: CT IMAGING | Age: 63
DRG: 374 | End: 2023-11-18
Payer: COMMERCIAL

## 2023-11-18 LAB
ALBUMIN SERPL-MCNC: 2.8 G/DL (ref 3.4–5)
ANION GAP SERPL CALCULATED.3IONS-SCNC: 16 MMOL/L (ref 3–16)
BUN SERPL-MCNC: 59 MG/DL (ref 7–20)
CALCIUM SERPL-MCNC: 8.2 MG/DL (ref 8.3–10.6)
CHLORIDE SERPL-SCNC: 92 MMOL/L (ref 99–110)
CHOLEST SERPL-MCNC: 184 MG/DL (ref 0–199)
CO2 SERPL-SCNC: 26 MMOL/L (ref 21–32)
CREAT SERPL-MCNC: 5.8 MG/DL (ref 0.8–1.3)
CRP SERPL-MCNC: 42.5 MG/L (ref 0–5.1)
DEPRECATED RDW RBC AUTO: 19.3 % (ref 12.4–15.4)
ERYTHROCYTE [SEDIMENTATION RATE] IN BLOOD BY WESTERGREN METHOD: 99 MM/HR (ref 0–20)
GFR SERPLBLD CREATININE-BSD FMLA CKD-EPI: 10 ML/MIN/{1.73_M2}
GLUCOSE SERPL-MCNC: 82 MG/DL (ref 70–99)
HCT VFR BLD AUTO: 23.7 % (ref 40.5–52.5)
HDLC SERPL-MCNC: 19 MG/DL (ref 40–60)
HGB BLD-MCNC: 8.2 G/DL (ref 13.5–17.5)
LDLC SERPL CALC-MCNC: 135 MG/DL
MCH RBC QN AUTO: 30.5 PG (ref 26–34)
MCHC RBC AUTO-ENTMCNC: 34.4 G/DL (ref 31–36)
MCV RBC AUTO: 88.7 FL (ref 80–100)
PHOSPHATE SERPL-MCNC: 5 MG/DL (ref 2.5–4.9)
PLATELET # BLD AUTO: 86 K/UL (ref 135–450)
PMV BLD AUTO: 7.9 FL (ref 5–10.5)
POTASSIUM SERPL-SCNC: 4 MMOL/L (ref 3.5–5.1)
RBC # BLD AUTO: 2.67 M/UL (ref 4.2–5.9)
SODIUM SERPL-SCNC: 134 MMOL/L (ref 136–145)
TRIGL SERPL-MCNC: 152 MG/DL (ref 0–150)
TROPONIN, HIGH SENSITIVITY: 131 NG/L (ref 0–22)
VLDLC SERPL CALC-MCNC: 30 MG/DL
WBC # BLD AUTO: 8.5 K/UL (ref 4–11)

## 2023-11-18 PROCEDURE — 87341 HEP B SURFACE AG NEUTRLZJ IA: CPT

## 2023-11-18 PROCEDURE — 80061 LIPID PANEL: CPT

## 2023-11-18 PROCEDURE — 85027 COMPLETE CBC AUTOMATED: CPT

## 2023-11-18 PROCEDURE — 87340 HEPATITIS B SURFACE AG IA: CPT

## 2023-11-18 PROCEDURE — 6360000002 HC RX W HCPCS: Performed by: STUDENT IN AN ORGANIZED HEALTH CARE EDUCATION/TRAINING PROGRAM

## 2023-11-18 PROCEDURE — 6370000000 HC RX 637 (ALT 250 FOR IP): Performed by: NURSE PRACTITIONER

## 2023-11-18 PROCEDURE — 86140 C-REACTIVE PROTEIN: CPT

## 2023-11-18 PROCEDURE — 6360000004 HC RX CONTRAST MEDICATION: Performed by: STUDENT IN AN ORGANIZED HEALTH CARE EDUCATION/TRAINING PROGRAM

## 2023-11-18 PROCEDURE — 84484 ASSAY OF TROPONIN QUANT: CPT

## 2023-11-18 PROCEDURE — 85652 RBC SED RATE AUTOMATED: CPT

## 2023-11-18 PROCEDURE — 2580000003 HC RX 258: Performed by: PHYSICIAN ASSISTANT

## 2023-11-18 PROCEDURE — 6370000000 HC RX 637 (ALT 250 FOR IP): Performed by: INTERNAL MEDICINE

## 2023-11-18 PROCEDURE — 99223 1ST HOSP IP/OBS HIGH 75: CPT | Performed by: INTERNAL MEDICINE

## 2023-11-18 PROCEDURE — 86706 HEP B SURFACE ANTIBODY: CPT

## 2023-11-18 PROCEDURE — 1200000000 HC SEMI PRIVATE

## 2023-11-18 PROCEDURE — 74177 CT ABD & PELVIS W/CONTRAST: CPT

## 2023-11-18 PROCEDURE — 36415 COLL VENOUS BLD VENIPUNCTURE: CPT

## 2023-11-18 PROCEDURE — 93005 ELECTROCARDIOGRAM TRACING: CPT | Performed by: STUDENT IN AN ORGANIZED HEALTH CARE EDUCATION/TRAINING PROGRAM

## 2023-11-18 PROCEDURE — 71250 CT THORAX DX C-: CPT

## 2023-11-18 PROCEDURE — 80069 RENAL FUNCTION PANEL: CPT

## 2023-11-18 PROCEDURE — 6370000000 HC RX 637 (ALT 250 FOR IP): Performed by: PHYSICIAN ASSISTANT

## 2023-11-18 RX ORDER — COLCHICINE 0.6 MG/1
0.3 TABLET ORAL DAILY
Status: DISCONTINUED | OUTPATIENT
Start: 2023-11-18 | End: 2023-11-18

## 2023-11-18 RX ORDER — SEVELAMER CARBONATE 800 MG/1
800 TABLET, FILM COATED ORAL
Status: DISCONTINUED | OUTPATIENT
Start: 2023-11-18 | End: 2023-11-28 | Stop reason: HOSPADM

## 2023-11-18 RX ORDER — COLCHICINE 0.6 MG/1
0.3 TABLET ORAL DAILY
Status: DISCONTINUED | OUTPATIENT
Start: 2023-11-18 | End: 2023-11-28 | Stop reason: HOSPADM

## 2023-11-18 RX ORDER — MORPHINE SULFATE 2 MG/ML
2 INJECTION, SOLUTION INTRAMUSCULAR; INTRAVENOUS EVERY 4 HOURS PRN
Status: DISCONTINUED | OUTPATIENT
Start: 2023-11-18 | End: 2023-11-28 | Stop reason: HOSPADM

## 2023-11-18 RX ADMIN — SEVELAMER CARBONATE 800 MG: 800 TABLET, FILM COATED ORAL at 13:26

## 2023-11-18 RX ADMIN — ASPIRIN 81 MG 81 MG: 81 TABLET ORAL at 13:26

## 2023-11-18 RX ADMIN — APIXABAN 2.5 MG: 2.5 TABLET, FILM COATED ORAL at 00:38

## 2023-11-18 RX ADMIN — APIXABAN 2.5 MG: 2.5 TABLET, FILM COATED ORAL at 20:06

## 2023-11-18 RX ADMIN — AMLODIPINE BESYLATE 5 MG: 5 TABLET ORAL at 13:26

## 2023-11-18 RX ADMIN — ACETAMINOPHEN 650 MG: 325 TABLET ORAL at 18:09

## 2023-11-18 RX ADMIN — TAMSULOSIN HYDROCHLORIDE 0.4 MG: 0.4 CAPSULE ORAL at 13:25

## 2023-11-18 RX ADMIN — FAMOTIDINE 10 MG: 20 TABLET ORAL at 13:25

## 2023-11-18 RX ADMIN — COLCHICINE 0.3 MG: 0.6 TABLET, FILM COATED ORAL at 17:59

## 2023-11-18 RX ADMIN — IOPAMIDOL 75 ML: 755 INJECTION, SOLUTION INTRAVENOUS at 22:08

## 2023-11-18 RX ADMIN — APIXABAN 2.5 MG: 2.5 TABLET, FILM COATED ORAL at 13:26

## 2023-11-18 RX ADMIN — SEVELAMER CARBONATE 800 MG: 800 TABLET, FILM COATED ORAL at 01:00

## 2023-11-18 RX ADMIN — SEVELAMER CARBONATE 800 MG: 800 TABLET, FILM COATED ORAL at 17:58

## 2023-11-18 RX ADMIN — SODIUM CHLORIDE, PRESERVATIVE FREE 10 ML: 5 INJECTION INTRAVENOUS at 13:26

## 2023-11-18 RX ADMIN — MORPHINE SULFATE 2 MG: 2 INJECTION, SOLUTION INTRAMUSCULAR; INTRAVENOUS at 09:28

## 2023-11-18 RX ADMIN — SODIUM CHLORIDE, PRESERVATIVE FREE 10 ML: 5 INJECTION INTRAVENOUS at 20:06

## 2023-11-18 ASSESSMENT — PAIN SCALES - GENERAL
PAINLEVEL_OUTOF10: 3
PAINLEVEL_OUTOF10: 4
PAINLEVEL_OUTOF10: 4
PAINLEVEL_OUTOF10: 3
PAINLEVEL_OUTOF10: 6
PAINLEVEL_OUTOF10: 0
PAINLEVEL_OUTOF10: 3
PAINLEVEL_OUTOF10: 3
PAINLEVEL_OUTOF10: 4
PAINLEVEL_OUTOF10: 7
PAINLEVEL_OUTOF10: 3

## 2023-11-18 ASSESSMENT — PAIN DESCRIPTION - ORIENTATION
ORIENTATION: MID
ORIENTATION: LEFT
ORIENTATION: MID
ORIENTATION: MID

## 2023-11-18 ASSESSMENT — PAIN DESCRIPTION - LOCATION
LOCATION: CHEST;BACK
LOCATION: BACK
LOCATION: BACK
LOCATION: CHEST;BACK
LOCATION: BACK
LOCATION: BACK
LOCATION: CHEST;BACK
LOCATION: BACK

## 2023-11-18 ASSESSMENT — PAIN DESCRIPTION - DESCRIPTORS
DESCRIPTORS: ACHING

## 2023-11-18 ASSESSMENT — PAIN - FUNCTIONAL ASSESSMENT: PAIN_FUNCTIONAL_ASSESSMENT: PREVENTS OR INTERFERES SOME ACTIVE ACTIVITIES AND ADLS

## 2023-11-18 ASSESSMENT — PAIN DESCRIPTION - FREQUENCY: FREQUENCY: INTERMITTENT

## 2023-11-18 ASSESSMENT — PAIN DESCRIPTION - ONSET: ONSET: PROGRESSIVE

## 2023-11-18 ASSESSMENT — PAIN DESCRIPTION - PAIN TYPE: TYPE: ACUTE PAIN

## 2023-11-18 ASSESSMENT — PAIN SCALES - WONG BAKER: WONGBAKER_NUMERICALRESPONSE: 0

## 2023-11-18 NOTE — CARE COORDINATION
Case Management Assessment  Initial Evaluation    Date/Time of Evaluation: 11/18/2023 1:47 PM  Assessment Completed by: JIA Ugalde    If patient is discharged prior to next notation, then this note serves as note for discharge by case management. Patient Name: Manuel Conde                   YOB: 1960  Diagnosis: Hyperbilirubinemia [E80.6]  Transaminitis [R74.01]  Other ascites [R18.8]  Elevated alkaline phosphatase level [R74.8]  Elevated bilirubin [R17]  T12 compression fracture, initial encounter ([address]) [L09.356Y]  Chronic kidney disease with end stage renal failure on dialysis ([address]) [N18.6, Z99.2]  Chest pain, unspecified type [R07.9]                   Date / Time: 11/17/2023  5:38 PM    Patient Admission Status: Inpatient   Readmission Risk (Low < 19, Mod (19-27), High > 27): Readmission Risk Score: 23.9    Current PCP: Juan Miguel Umanzor MD  PCP verified by ? Yes    Chart Reviewed: Yes      History Provided by: Patient  Patient Orientation: Alert and Oriented    Patient Cognition: Alert    Hospitalization in the last 30 days (Readmission):  No    If yes, Readmission Assessment in CM Navigator will be completed. Advance Directives:      Code Status: Full Code   Patient's Primary Decision Maker is: Legal Next of Kin    Primary Decision Maker: Ambrocio Kennedy [facility] - [phone]    Discharge Planning:    Patient lives with: Spouse/Significant Other Type of Home: House  Primary Care Giver: Spouse  Patient Support Systems include: Family Members, Spouse/Significant Other   Current Financial resources: Medicare  Current community resources: None  Current services prior to admission: Other (Comment) (Dialysis patient at [facility].)            Current DME:              Type of Home Care services:  None    ADLS  Prior functional level: Assistance with the following:, Bathing, Dressing, Cooking, Housework, Shopping, Mobility  Current functional level:  Other (see comment),

## 2023-11-18 NOTE — CONSULTS
Gastroenterology Consult Note                          Patient: April Ron  : 1960  CSN#:      Date:  2023    Subjective:       History of Present Illness  Patient is a 58 y.o.  male admitted with Hyperbilirubinemia [E80.6]  Transaminitis [R74.01]  Other ascites [R18.8]  Elevated alkaline phosphatase level [R74.8]  Elevated bilirubin [R17]  T12 compression fracture, initial encounter ([address]) [S22.080A]  Chronic kidney disease with end stage renal failure on dialysis ([address]) [N18.6, Z99.2]  Chest pain, unspecified type [R07.9] who is seen in consult for jaundice. Pt with h/o esophageal cancer dx 2017. Then with bx proven metastatic disease to the lungs this year. Started on FOLFOX and Keytruda  but has been  on drug holiday since Aug.  He denies ab pain or fevers. Denies new meds or recent antibiotics. CT done without contrast shows normal bile duct. No obvious mets to liver but it was a noncontrast ct.         Past Medical History:   Diagnosis Date    Anesthesia     hypertension with anesthesia    Chronic diarrhea     patient reports chronic diarrhea for 1 year and sees a specialist at [address] [place] [address]: has been checked for c.dif multiple times and always negative    Esophageal cancer ([address])      and diagnosed again : started chemotherapy  23: on 2 different chemo treatment one is every 2 weeks and the other treatment is every 3 weeks    Hyperlipidemia     Kidney disease     Dialysis on  and     Lung cancer [facility])     recent diagnosis     Neuropathy     Prostate enlargement       Past Surgical History:   Procedure Laterality Date    BLADDER SURGERY Left 2023    CYSTOSCOPY WITH LEFT STENT REMOVAL performed by Gabriel Claros MD at [address] [facility] N/A 2022    COLONOSCOPY WITH BIOPSY performed by Michael Olivarez MD at [address] [place] [address]  2023    CT GUIDED CHEST TUBE 2023 Rosina Alcala Toro Martinez MD [facility] CT SCAN    CT NEEDLE BIOPSY LUNG PERCUTANEOUS  05/24/2023    CT NEEDLE BIOPSY LUNG PERCUTANEOUS 5/24/2023 Angelia Rowland MD [facility] CT SCAN    CYSTOSCOPY Left 6/30/2023    CYSTOSCOPY, LEFT RETROGRADE PYELOGRAM, PLACEMENT OF LEFT URETERAL STENT performed by Liz[facility] Trejo MD [facility]      for cancer: 2017    IR PORT PLACEMENT EQUAL OR GREATER THAN 5 YEARS  12/08/2022    IR PORT PLACEMENT EQUAL OR GREATER THAN 5 YEARS 12/8/2022 [facility] SPECIAL PROCEDURES    IR TUNNELED CATHETER PLACEMENT GREATER THAN 5 YEARS  6/7/2023    IR TUNNELED CATHETER PLACEMENT GREATER THAN 5 YEARS 6/7/2023 Angelia Rowland MD [facility] SPECIAL PROCEDURES    KIDNEY REMOVAL Right 09/21/2020    ROBOTIC LAPAROSCOPIC RIGHT NEPHRECTOMY performed by Lizbeth Trejo MD at [address]      has only left kidney    TUNNELED VENOUS PORT PLACEMENT Right 02/08/2018    inserted in radiology by Dr Rachel Ferrari as outpt \"power port\"    UPPER GASTROINTESTINAL ENDOSCOPY  2017    UPPER GASTROINTESTINAL ENDOSCOPY  05/17/2017    EUS    UPPER GASTROINTESTINAL ENDOSCOPY  04/06/2018          Admission Meds  No current facility-administered medications on file prior to encounter.      Current Outpatient Medications on File Prior to Encounter   Medication Sig Dispense Refill    apixaban (ELIQUIS) 2.5 MG TABS tablet Take 1 tablet by mouth 2 times daily      Calcium Carbonate-Simethicone 400-24 MG CHEW  (Patient not taking: Reported on 11/17/2023)      cholestyramine (QUESTRAN) 4 GM/DOSE powder  (Patient not taking: Reported on 11/17/2023)      dexamethasone (DECADRON) 4 MG/ML injection Infuse 2.5 mLs intravenously      famotidine (PEPCID) 20 MG tablet Take 1 tablet by mouth daily      famotidine (PEPCID) 40 MG/4ML injection Infuse 2 mLs intravenously      fluorouracil (ADRUCIL) 2.5 GM/50ML chemo injection Infuse 77.8 mLs intravenously      Iron-Vit C-Vit B12-Folic Acid 325-085-8.866-7 MG TABS Take 1 tablet by mouth daily

## 2023-11-18 NOTE — PROGRESS NOTES
Pharmacy Home Medication Reconciliation Note    A medication reconciliation has been completed for Kala Batista 1960    Pharmacy: [address] [place] [address]  Information provided by: patient w/med list    The patient's home medication list is as follows: No current facility-administered medications on file prior to encounter.      Current Outpatient Medications on File Prior to Encounter   Medication Sig Dispense Refill    apixaban (ELIQUIS) 2.5 MG TABS tablet       Calcium Carbonate-Simethicone 400-24 MG CHEW       CHOLESTYRAMINE PO       cholestyramine (QUESTRAN) 4 GM/DOSE powder       dexamethasone (DECADRON) 4 MG/ML injection Infuse 2.5 mLs intravenously      famotidine (PEPCID) 20 MG tablet       famotidine (PEPCID) 40 MG/4ML injection Infuse 2 mLs intravenously      fluorouracil (ADRUCIL) 2.5 GM/50ML chemo injection Infuse 77.8 mLs intravenously      Iron-Vit C-Vit B12-Folic Acid 154-022-5.680-7 MG TABS       leucovorin calcium (WELLCOVORIN) 50 MG injection Infuse 34.2 mLs intravenously      Methoxy PEG-Epoetin Beta (MIRCERA) 150 MCG/0.3ML SOSY       metroNIDAZOLE (FLAGYL) 250 MG tablet       pembrolizumab (KEYTRUDA) 100 MG/4ML SOLN Infuse 8 mLs intravenously      sevelamer (RENVELA) 800 MG tablet Take 1 tablet by mouth      sodium chloride 0.9 % infusion Infuse 1,000 mLs intravenously      trastuzumab-qyyp (TRAZIMERA) 420 MG SOLR injection Infuse intravenously      Tuberculin PPD (TUBERSOL ID) Inject 0.1 mLs into the skin      furosemide (LASIX) 80 MG tablet Take 1 tablet by mouth every other day 60 tablet 3    sevelamer hcl (RENAGEL) 800 MG tablet Take 1 tablet by mouth 3 times daily (with meals)      Esomeprazole Magnesium (NEXIUM PO) Take 1 tablet by mouth daily      tamsulosin (FLOMAX) 0.4 MG capsule Take 1 capsule by mouth daily 30 capsule 3    ondansetron (ZOFRAN-ODT) 8 MG TBDP disintegrating tablet Place 1 tablet under the tongue every 8 hours as needed for Nausea

## 2023-11-18 NOTE — CONSULTS
[address]  [phone]      Chief Complaint   Patient presents with    Chest Pain        Reason for consult:  chest pain    ASSESSMENT AND PLAN:    Assessment/Plan:  Chest pain of uncertain etiology   ECG suggestive of acute pericarditis, suspect may be related to primary cancer given proximity of esophagus to pericardium   Could be uremic pericarditis but not likely as mental status is normal and he hasn't missed any dialysis    Esophageal cancer with mets to the lung  ESRD on dialysis  Moderate aortic regurgitation  Elevated troponin with flat trend, likely demand ischemia from combination of ESRD and anemia   Troponin leak could be from PE; however he isn't tachycardic or hypoxic, and is already on a NOAC so this is less likely    Hyperbilirubinemia  Anemia of chronic disease, acutely worse over last 2 weeks (? Acute blood loss?)    Hypoalbuminemia  Mixed hyperlipidemia  T12 compression fracture  Hypertension  On long-term anticoagulation therapy (Eliquis)      Recs  -Check ESR, CRP  -Echo on Monday  -Typical treatment for pericarditis is NSAIDS, colchicine, or steroids  -Per GI colchicine could cause jaundice and he is already very jaundiced   -I clarified with Dr. Ez Eisenberg that he has actually never had any colchicine   -Per my discussion with Dr. Ez Eisenberg ok to start colchicine   -Will need to very carefully dose colchicine with assistance from nephrology since he is a dialysis patient  -Given recent bleeding I am reluctant to start NSAIDs  -Consider stopping NOAC in light of recent bleed, defer to hem-onc    -If unsafe to give colchicine from a nephrology standpoint, since his symptoms aren't severe and he is hemodynamically stable I would prefer conservative monitoring rather than starting steroids (at least until echo is done)    -No indication for any invasive cardiac studies at this time      History of Present Illness:  Alana Martinez is a 58 y.o. patient with esopageal tablet Place 1 tablet under the tongue every 8 hours as needed for Nausea or Vomiting    ProviderTerese MD   amLODIPine (NORVASC) 5 MG tablet Take 1 tablet by mouth daily 6/12/23   Mikhail Rose MD   diphenoxylate-atropine (LOMOTIL) 2.5-0.025 MG per tablet Take 2 tablets by mouth 4 times daily as needed for Diarrhea (chronic diarrhea). ProviderTerese MD   medical marijuana Take 1 each by mouth as needed (Chronic pain. ).     ProviderTerese MD      Current Medications:  Current Facility-Administered Medications   Medication Dose Route Frequency Provider Last Rate Last Admin    sevelamer (RENVELA) tablet 800 mg  800 mg Oral TID  Noah Morales, ELISEO - CNP   800 mg at 11/18/23 1326    morphine (PF) injection 2 mg  2 mg IntraVENous Q4H PRN Kirti Saldaña MD   2 mg at 11/18/23 1302    [Held by provider] colchicine (COLCRYS) tablet 0.3 mg  0.3 mg Oral Daily Kirti Saldaña MD        amLODIPine (NORVASC) tablet 5 mg  5 mg Oral Daily Teodora Villalobos PA-C   5 mg at 11/18/23 1326    apixaban (ELIQUIS) tablet 2.5 mg  2.5 mg Oral BID Teodora Villalobos PA-C   2.5 mg at 11/18/23 1326    diphenoxylate-atropine (LOMOTIL) 2.5-0.025 MG per tablet 2 tablet  2 tablet Oral 4x Daily PRN Teodora Villalobos PA-C        tamsulosin [facility]) capsule 0.4 mg  0.4 mg Oral Daily Teodora Villalobos PA-C   0.4 mg at 11/18/23 1325    famotidine (PEPCID) tablet 10 mg  10 mg Oral Daily Teodora Villalobos PA-C   10 mg at 11/18/23 1325    sodium chloride flush 0.9 % injection 10 mL  10 mL IntraVENous 2 times per day Teodora Villalobos PA-C   10 mL at 11/18/23 1326    sodium chloride flush 0.9 % injection 10 mL  10 mL IntraVENous PRN Teodora Villalobos PA-C        0.9 % sodium chloride infusion   IntraVENous PRN Teodora Villalobos PA-C        ondansetron [facility]) injection 4 mg  4 mg IntraVENous Q6H PRN Teodora Villalobos PA-C        acetaminophen (TYLENOL) tablet 650 mg  650 mg Oral Q6H PRN Igor Euceda,

## 2023-11-18 NOTE — PROGRESS NOTES
4 Eyes Skin Assessment     NAME:  Lucia Perea  YOB: 1960  MEDICAL RECORD NUMBER:  [id number]    The patient is being assessed for  Admission    I agree that at least one RN has performed a thorough Head to Toe Skin Assessment on the patient. ALL assessment sites listed below have been assessed. Areas assessed by both nurses:    Head, Face, Ears, Shoulders, Back, Chest, Arms, Elbows, Hands, Sacrum. Buttock, Coccyx, Ischium, Legs. Feet and Heels, and Under Medical Devices         Does the Patient have a Wound?  No noted wound(s)       Thor Prevention initiated by RN: Yes  Wound Care Orders initiated by RN: No    Pressure Injury (Stage 3,4, Unstageable, DTI, NWPT, and Complex wounds) if present, place Wound referral order by RN under : No    New Ostomies, if present place, Ostomy referral order under : No     Nurse 1 eSignature: Electronically signed by Dalila Bae RN on 11/18/23 at 6:23 AM EST    **SHARE this note so that the co-signing nurse can place an eSignature**    Nurse 2 eSignature: Electronically signed by Selina Edmonds RN on 11/18/23 at 6:30 AM EST

## 2023-11-18 NOTE — PROGRESS NOTES
Date of Admission: 11/17/2023. Hospital Day: 2       Assessment/Plan:   58 y.o. male who presented to ED for evaluation of chest pain, sob. Pain started this morning. Patient reports he has chest pain at rest and with deep breaths      Chest pain  - EKG mild but generalized ST elevation consistent with acute pericarditis. ? Viral , pt reported  cough, weakness  recently    Repeat EKG  - Troponin 147 > 124, chronically elevated in the setting of renal insufficiency, stable compared to prior troponin. No h/o CAD  -start  colcichine   - ASA ordered  - No statin due to abnormal LFTs  - Check lipid panel   - Monitor on telemetry  - Cardiology consulted      Hyperbilirubinemia/acute hepatitis  Severely elevated bilirubin, total 13, direct 3. ALP 1500 , AST and ALT also elevated but seems to be trending down  Obtain hepatitis panel . RUQ US  GI consulted      Esophageal cancer, st IV  Has lung mets. Recently completed chemo   oncology consulted      ESRD on HD  - HD TTS  - Renally dose medications  - Monitor for electrolyte abnormalities  - Nephrology consulted    Active Hospital Problems    Diagnosis     Hyperbilirubinemia [E80.6]            DVT Prophylaxis:    Diet: ADULT DIET; Regular;  No Caffeine  Code Status: Full Code      Dispo - home    All  follow up labs and imaging personally reviewed      Chief Complaint:   Chief Complaint   Patient presents with    Chest Pain         Interval  History:   Pain persistent but better with morphine        Medications:  Reviewed    Infusion Medications    sodium chloride       Scheduled Medications    sevelamer  800 mg Oral TID WC    amLODIPine  5 mg Oral Daily    apixaban  2.5 mg Oral BID    tamsulosin  0.4 mg Oral Daily    famotidine  10 mg Oral Daily    sodium chloride flush  10 mL IntraVENous 2 times per day    aspirin  81 mg Oral Daily     PRN Meds: morphine, diphenoxylate-atropine, sodium chloride flush, sodium chloride, ondansetron, acetaminophen **OR**

## 2023-11-18 NOTE — PLAN OF CARE
Discussed colchicine with both Dr Felisa Canseco and Dr Chata Gates    Per Dr Chata Gates ok to start colchicine at 0.3 mg daily (adjusted dose due to ESRD)    Per Dr Felisa Canseco since patient had jaundice despite never having received a dose of colchine, then colcicine is not the cause of the jaundice so ok to start

## 2023-11-18 NOTE — PROGRESS NOTES
Pharmacy Home Medication Reconciliation Note    A medication reconciliation has been completed for Leopoldo Finn 1960    Pharmacy: [address] [place] [address]  Information provided by: patient w/med list    The patient's home medication list is as follows: No current facility-administered medications on file prior to encounter.      Current Outpatient Medications on File Prior to Encounter   Medication Sig Dispense Refill    apixaban (ELIQUIS) 2.5 MG TABS tablet       Calcium Carbonate-Simethicone 400-24 MG CHEW       CHOLESTYRAMINE PO       cholestyramine (QUESTRAN) 4 GM/DOSE powder       dexamethasone (DECADRON) 4 MG/ML injection Infuse 2.5 mLs intravenously      famotidine (PEPCID) 20 MG tablet       famotidine (PEPCID) 40 MG/4ML injection Infuse 2 mLs intravenously      fluorouracil (ADRUCIL) 2.5 GM/50ML chemo injection Infuse 77.8 mLs intravenously      Iron-Vit C-Vit B12-Folic Acid [id number] MG TABS       leucovorin calcium (WELLCOVORIN) 50 MG injection Infuse 34.2 mLs intravenously      Methoxy PEG-Epoetin Beta (MIRCERA) 150 MCG/0.3ML SOSY       metroNIDAZOLE (FLAGYL) 250 MG tablet       pembrolizumab (KEYTRUDA) 100 MG/4ML SOLN Infuse 8 mLs intravenously      sevelamer (RENVELA) 800 MG tablet Take 1 tablet by mouth      sodium chloride 0.9 % infusion Infuse 1,000 mLs intravenously      trastuzumab-qyyp (TRAZIMERA) 420 MG SOLR injection Infuse intravenously      Tuberculin PPD (TUBERSOL ID) Inject 0.1 mLs into the skin      furosemide (LASIX) 80 MG tablet Take 1 tablet by mouth every other day 60 tablet 3    sevelamer hcl (RENAGEL) 800 MG tablet Take 1 tablet by mouth 3 times daily (with meals)      Esomeprazole Magnesium (NEXIUM PO) Take 1 tablet by mouth daily      tamsulosin (FLOMAX) 0.4 MG capsule Take 1 capsule by mouth daily 30 capsule 3    ondansetron (ZOFRAN-ODT) 8 MG TBDP disintegrating tablet Place 1 tablet under the tongue every 8 hours as needed for Nausea

## 2023-11-18 NOTE — ED NOTES
ED TO INPATIENT SBAR HANDOFF    Patient Name: Jeanette Leger   :  1960  58 y.o. MRN:  [id number]  [facility]  [facility] Room #:  ED-0027/27  Family/Caregiver Present yes   Restraints no   Sitter no   Sepsis Risk Score Sepsis Risk Score: 2.01    Situation  Code Status: Prior No additional code details. Allergies: Patient has no known allergies. Weight: No data found. Arrived from: home  Chief Complaint:   Chief Complaint   Patient presents with    Chest Pain     Hospital Problem/Diagnosis:  Active Problems:    * No active hospital problems. *  Resolved Problems:    * No resolved hospital problems. *    Imaging:   CT ABDOMEN PELVIS WO CONTRAST Additional Contrast? None   Final Result   Severe cachexia and probable 3rd spacing of fluid or edema in the   subcutaneous soft tissues throughout the abdomen and pelvis which is more   prominent and is severely limiting the exam.      Central venous catheter along the right groin extending to intrahepatic IVC   which is grossly unchanged      Previous gastric pull up procedure along the right chest which is unchanged   with small bibasilar pleural effusions and associated bibasilar atelectasis   and/or infiltrates posteriorly which is more prominent. Suggest follow-up   with chest x-rays. Questionable cholelithiasis which is more apparent. Absent right kidney which and mild to moderate hydronephrosis on the left   which is unchanged. Mild constipation with no obstruction. Mild ascites in the abdomen and pelvis with questionable mild edema and   stranding throughout the mesentery which may be related to the 3rd spacing of   fluid or ascites which is limiting the exam and is more prominent.       Mild prostatic enlargement with poor visualization of the bladder and no   obvious pelvic mass or active inflammation can be seen      Probable metastatic along T12 and L1 and a questionable mild pathologic   compression fracture of T12 which is

## 2023-11-18 NOTE — PROGRESS NOTES
APRN contacted by RN-patient requesting first dose of sevelemer so he can eat now. Patient did not take his dinner dose due to being in the ER.

## 2023-11-19 LAB
ALBUMIN SERPL-MCNC: 2.5 G/DL (ref 3.4–5)
ALBUMIN/GLOB SERPL: 0.7 {RATIO} (ref 1.1–2.2)
ALP SERPL-CCNC: 1341 U/L (ref 40–129)
ALT SERPL-CCNC: 47 U/L (ref 10–40)
ANION GAP SERPL CALCULATED.3IONS-SCNC: 16 MMOL/L (ref 3–16)
AST SERPL-CCNC: 118 U/L (ref 15–37)
BILIRUB SERPL-MCNC: 11.8 MG/DL (ref 0–1)
BUN SERPL-MCNC: 81 MG/DL (ref 7–20)
CALCIUM SERPL-MCNC: 8 MG/DL (ref 8.3–10.6)
CHLORIDE SERPL-SCNC: 92 MMOL/L (ref 99–110)
CO2 SERPL-SCNC: 26 MMOL/L (ref 21–32)
CREAT SERPL-MCNC: 7.8 MG/DL (ref 0.8–1.3)
DEPRECATED RDW RBC AUTO: 19.5 % (ref 12.4–15.4)
EKG ATRIAL RATE: 77 BPM
EKG DIAGNOSIS: NORMAL
EKG P AXIS: 81 DEGREES
EKG P-R INTERVAL: 148 MS
EKG Q-T INTERVAL: 398 MS
EKG QRS DURATION: 90 MS
EKG QTC CALCULATION (BAZETT): 450 MS
EKG R AXIS: 51 DEGREES
EKG T AXIS: 32 DEGREES
EKG VENTRICULAR RATE: 77 BPM
GFR SERPLBLD CREATININE-BSD FMLA CKD-EPI: 7 ML/MIN/{1.73_M2}
GLUCOSE BLD-MCNC: 86 MG/DL (ref 70–99)
GLUCOSE SERPL-MCNC: 75 MG/DL (ref 70–99)
HBV SURFACE AB SERPL IA-ACNC: <3.5 MIU/ML
HBV SURFACE AG SERPL QL IA: REACTIVE
HCT VFR BLD AUTO: 21.7 % (ref 40.5–52.5)
HGB BLD-MCNC: 7.5 G/DL (ref 13.5–17.5)
MCH RBC QN AUTO: 30.1 PG (ref 26–34)
MCHC RBC AUTO-ENTMCNC: 34.4 G/DL (ref 31–36)
MCV RBC AUTO: 87.6 FL (ref 80–100)
PERFORMED ON: NORMAL
PLATELET # BLD AUTO: 108 K/UL (ref 135–450)
PMV BLD AUTO: 8 FL (ref 5–10.5)
POTASSIUM SERPL-SCNC: 4.6 MMOL/L (ref 3.5–5.1)
PROT SERPL-MCNC: 6.1 G/DL (ref 6.4–8.2)
RBC # BLD AUTO: 2.48 M/UL (ref 4.2–5.9)
SODIUM SERPL-SCNC: 134 MMOL/L (ref 136–145)
WBC # BLD AUTO: 8.6 K/UL (ref 4–11)

## 2023-11-19 PROCEDURE — 85027 COMPLETE CBC AUTOMATED: CPT

## 2023-11-19 PROCEDURE — 5A1D70Z PERFORMANCE OF URINARY FILTRATION, INTERMITTENT, LESS THAN 6 HOURS PER DAY: ICD-10-PCS | Performed by: INTERNAL MEDICINE

## 2023-11-19 PROCEDURE — 2580000003 HC RX 258: Performed by: PHYSICIAN ASSISTANT

## 2023-11-19 PROCEDURE — 6370000000 HC RX 637 (ALT 250 FOR IP): Performed by: NURSE PRACTITIONER

## 2023-11-19 PROCEDURE — 6370000000 HC RX 637 (ALT 250 FOR IP): Performed by: INTERNAL MEDICINE

## 2023-11-19 PROCEDURE — 36415 COLL VENOUS BLD VENIPUNCTURE: CPT

## 2023-11-19 PROCEDURE — 80053 COMPREHEN METABOLIC PANEL: CPT

## 2023-11-19 PROCEDURE — 93010 ELECTROCARDIOGRAM REPORT: CPT | Performed by: INTERNAL MEDICINE

## 2023-11-19 PROCEDURE — 6370000000 HC RX 637 (ALT 250 FOR IP): Performed by: PHYSICIAN ASSISTANT

## 2023-11-19 PROCEDURE — 90935 HEMODIALYSIS ONE EVALUATION: CPT

## 2023-11-19 PROCEDURE — 1200000000 HC SEMI PRIVATE

## 2023-11-19 PROCEDURE — 99232 SBSQ HOSP IP/OBS MODERATE 35: CPT | Performed by: NURSE PRACTITIONER

## 2023-11-19 RX ORDER — GLUCAGON 1 MG/ML
1 KIT INJECTION PRN
Status: DISCONTINUED | OUTPATIENT
Start: 2023-11-19 | End: 2023-11-28 | Stop reason: HOSPADM

## 2023-11-19 RX ORDER — INSULIN LISPRO 100 [IU]/ML
6 INJECTION, SOLUTION INTRAVENOUS; SUBCUTANEOUS
Status: DISCONTINUED | OUTPATIENT
Start: 2023-11-19 | End: 2023-11-19

## 2023-11-19 RX ORDER — DEXTROSE MONOHYDRATE 100 MG/ML
INJECTION, SOLUTION INTRAVENOUS CONTINUOUS PRN
Status: DISCONTINUED | OUTPATIENT
Start: 2023-11-19 | End: 2023-11-28 | Stop reason: HOSPADM

## 2023-11-19 RX ADMIN — ASPIRIN 81 MG 81 MG: 81 TABLET ORAL at 09:14

## 2023-11-19 RX ADMIN — FAMOTIDINE 10 MG: 20 TABLET ORAL at 09:14

## 2023-11-19 RX ADMIN — SEVELAMER CARBONATE 800 MG: 800 TABLET, FILM COATED ORAL at 09:13

## 2023-11-19 RX ADMIN — SODIUM CHLORIDE, PRESERVATIVE FREE 10 ML: 5 INJECTION INTRAVENOUS at 21:45

## 2023-11-19 RX ADMIN — SEVELAMER CARBONATE 800 MG: 800 TABLET, FILM COATED ORAL at 16:46

## 2023-11-19 RX ADMIN — AMLODIPINE BESYLATE 5 MG: 5 TABLET ORAL at 18:03

## 2023-11-19 RX ADMIN — TAMSULOSIN HYDROCHLORIDE 0.4 MG: 0.4 CAPSULE ORAL at 09:13

## 2023-11-19 RX ADMIN — APIXABAN 2.5 MG: 2.5 TABLET, FILM COATED ORAL at 09:15

## 2023-11-19 RX ADMIN — COLCHICINE 0.3 MG: 0.6 TABLET, FILM COATED ORAL at 09:13

## 2023-11-19 RX ADMIN — SODIUM CHLORIDE, PRESERVATIVE FREE 10 ML: 5 INJECTION INTRAVENOUS at 09:15

## 2023-11-19 RX ADMIN — APIXABAN 2.5 MG: 2.5 TABLET, FILM COATED ORAL at 21:40

## 2023-11-19 NOTE — PROGRESS NOTES
Pt up to br with assist  x1. Pt very weak. HR increased to 110 with ambulation. Pt had large BM. Pericare provided. Pt assisted back to bed. Fall precautions in place.

## 2023-11-19 NOTE — PLAN OF CARE
Problem: Pain  Goal: Verbalizes/displays adequate comfort level or baseline comfort level  11/19/2023 1529 by Uzair Monaco RN  Outcome: Progressing  11/19/2023 1522 by Uzair Monaco RN  Outcome: Progressing  Flowsheets (Taken 11/19/2023 3832 by Odalys Lemos RN)  Verbalizes/displays adequate comfort level or baseline comfort level: Encourage patient to monitor pain and request assistance     Problem: Skin/Tissue Integrity  Goal: Absence of new skin breakdown  Description: 1. Monitor for areas of redness and/or skin breakdown  2. Assess vascular access sites hourly  3. Every 4-6 hours minimum:  Change oxygen saturation probe site  4. Every 4-6 hours:  If on nasal continuous positive airway pressure, respiratory therapy assess nares and determine need for appliance change or resting period.   11/19/2023 1529 by Uzair Monaco RN  Outcome: Progressing  11/19/2023 1522 by Uzair Monaco RN  Outcome: Progressing     Problem: Safety - Adult  Goal: Free from fall injury  11/19/2023 1529 by Uzair Monaco RN  Outcome: Progressing  11/19/2023 1522 by Uzair Monaco RN  Outcome: Progressing     Problem: ABCDS Injury Assessment  Goal: Absence of physical injury  Outcome: Progressing     Problem: Chronic Conditions and Co-morbidities  Goal: Patient's chronic conditions and co-morbidity symptoms are monitored and maintained or improved  Outcome: Progressing  Goal: Serum bilirubin within specified parameters  Description: Serum bilirubin within specified parameters  Outcome: Progressing

## 2023-11-19 NOTE — PLAN OF CARE
Problem: Discharge Planning  Goal: Discharge to home or other facility with appropriate resources  Outcome: Progressing  Flowsheets (Taken 11/18/2023 2005)  Discharge to home or other facility with appropriate resources: Identify barriers to discharge with patient and caregiver     Problem: Pain  Goal: Verbalizes/displays adequate comfort level or baseline comfort level  Outcome: Progressing  Flowsheets (Taken 11/18/2023 2004)  Verbalizes/displays adequate comfort level or baseline comfort level: Encourage patient to monitor pain and request assistance

## 2023-11-19 NOTE — PROGRESS NOTES
Date of Admission: 11/17/2023. Hospital Day: 3       Assessment/Plan:   58 y.o. male who presented to ED for evaluation of chest pain, sob. Pain started this morning. Patient reports he has chest pain at rest and with deep breaths    Acute pericarditis  - EKG mild but generalized ST elevation consistent with acute pericarditis. ? Viral , pt reported  cough, weakness  recently    Repeat EKG 1/19  show similar st elevations  - Troponin 147 > 124, chronically elevated in the setting of renal insufficiency, stable compared to prior troponin. No h/o CAD  -cont colcichine   - Check lipid panel   - Monitor on telemetry  - Cardiology consulted      Hyperbilirubinemia/acute hepatitis  Severely elevated bilirubin, total 13, direct 3 on admiison, trending down  . ALP 1500 , AST and ALT also elevated but seems to be trending down  Obtain hepatitis panel . Hbs Ag +, ab -ve . Could be false + from recent vaccination, d/w  GI dr Herman Deras, will obtain Hep B DNA levels  Ct w contrast abd -ve for liver mets      Esophageal cancer, st IV  Has lung mets. Recently completed chemo   oncology consulted      ESRD on HD  - HD TTS  - Renally dose medications  - Monitor for electrolyte abnormalities  - Nephrology consulted    Active Hospital Problems    Diagnosis     Hyperbilirubinemia [E80.6]            DVT Prophylaxis:    Diet: ADULT DIET; Regular; Low Potassium (Less than 3000 mg/day)  Code Status: Full Code      Dispo - home    All  follow up labs and imaging personally reviewed      Chief Complaint:   Chief Complaint   Patient presents with    Chest Pain         Interval  History:   Chest pain improved, no sob.  No n/v    Medications:  Reviewed    Infusion Medications    dextrose      sodium chloride       Scheduled Medications    sevelamer  800 mg Oral TID WC    colchicine  0.3 mg Oral Daily    amLODIPine  5 mg Oral Daily    apixaban  2.5 mg Oral BID    tamsulosin  0.4 mg Oral Daily    famotidine  10 mg Oral Daily    sodium posteriorly which is more prominent. Suggest follow-up   with chest x-rays. Questionable cholelithiasis which is more apparent. Absent right kidney which and mild to moderate hydronephrosis on the left   which is unchanged. Mild constipation with no obstruction. Mild ascites in the abdomen and pelvis with questionable mild edema and   stranding throughout the mesentery which may be related to the 3rd spacing of   fluid or ascites which is limiting the exam and is more prominent. Mild prostatic enlargement with poor visualization of the bladder and no   obvious pelvic mass or active inflammation can be seen      Probable metastatic along T12 and L1 and a questionable mild pathologic   compression fracture of T12 which is unchanged. Tiny pulmonary nodules along the lung bases which are unchanged. There is   could be due to metastatic disease. XR CHEST PORTABLE   Final Result   1. Small left pleural effusion with mild left basilar airspace disease.              Betty HORN CONSULT TO Callum Gallardo MD

## 2023-11-20 LAB
ABO + RH BLD: NORMAL
ALBUMIN SERPL-MCNC: 2.4 G/DL (ref 3.4–5)
ALBUMIN/GLOB SERPL: 0.7 {RATIO} (ref 1.1–2.2)
ALP SERPL-CCNC: 1397 U/L (ref 40–129)
ALT SERPL-CCNC: 46 U/L (ref 10–40)
ANION GAP SERPL CALCULATED.3IONS-SCNC: 12 MMOL/L (ref 3–16)
AST SERPL-CCNC: 123 U/L (ref 15–37)
BILIRUB SERPL-MCNC: 12.5 MG/DL (ref 0–1)
BLD GP AB SCN SERPL QL: NORMAL
BLOOD BANK DISPENSE STATUS: NORMAL
BLOOD BANK PRODUCT CODE: NORMAL
BPU ID: NORMAL
BUN SERPL-MCNC: 64 MG/DL (ref 7–20)
CALCIUM SERPL-MCNC: 8.2 MG/DL (ref 8.3–10.6)
CHLORIDE SERPL-SCNC: 95 MMOL/L (ref 99–110)
CO2 SERPL-SCNC: 26 MMOL/L (ref 21–32)
CREAT SERPL-MCNC: 5.9 MG/DL (ref 0.8–1.3)
DEPRECATED RDW RBC AUTO: 20.3 % (ref 12.4–15.4)
DESCRIPTION BLOOD BANK: NORMAL
FERRITIN SERPL IA-MCNC: 6475 NG/ML (ref 30–400)
GFR SERPLBLD CREATININE-BSD FMLA CKD-EPI: 10 ML/MIN/{1.73_M2}
GLUCOSE SERPL-MCNC: 74 MG/DL (ref 70–99)
HAPTOGLOB SERPL-MCNC: 32 MG/DL (ref 30–200)
HBV CORE IGM SERPL QL IA: NORMAL
HBV SURFACE AG SERPL QL NT: POSITIVE
HCT VFR BLD AUTO: 20.8 % (ref 40.5–52.5)
HCT VFR BLD AUTO: 21.2 % (ref 40.5–52.5)
HCT VFR BLD AUTO: 25.8 % (ref 40.5–52.5)
HGB BLD-MCNC: 7.2 G/DL (ref 13.5–17.5)
HGB BLD-MCNC: 8.9 G/DL (ref 13.5–17.5)
IMMATURE RETIC FRACT: 0.43 (ref 0.21–0.37)
IRON SATN MFR SERPL: 56 % (ref 20–50)
IRON SERPL-MCNC: 75 UG/DL (ref 59–158)
LDH SERPL L TO P-CCNC: 299 U/L (ref 100–190)
MCH RBC QN AUTO: 30.4 PG (ref 26–34)
MCHC RBC AUTO-ENTMCNC: 34.6 G/DL (ref 31–36)
MCV RBC AUTO: 87.6 FL (ref 80–100)
PATH INTERP BLD-IMP: NORMAL
PATH INTERP BLD-IMP: YES
PLATELET # BLD AUTO: 154 K/UL (ref 135–450)
PMV BLD AUTO: 8.7 FL (ref 5–10.5)
POTASSIUM SERPL-SCNC: 4.6 MMOL/L (ref 3.5–5.1)
PROT SERPL-MCNC: 6 G/DL (ref 6.4–8.2)
RBC # BLD AUTO: 2.38 M/UL (ref 4.2–5.9)
RETICS # AUTO: 0.05 M/UL
RETICS/RBC NFR AUTO: 2.11 % (ref 0.5–2.18)
SODIUM SERPL-SCNC: 133 MMOL/L (ref 136–145)
TIBC SERPL-MCNC: 134 UG/DL (ref 260–445)
WBC # BLD AUTO: 8.5 K/UL (ref 4–11)

## 2023-11-20 PROCEDURE — 93308 TTE F-UP OR LMTD: CPT

## 2023-11-20 PROCEDURE — 86015 ACTIN ANTIBODY EACH: CPT

## 2023-11-20 PROCEDURE — 30233N1 TRANSFUSION OF NONAUTOLOGOUS RED BLOOD CELLS INTO PERIPHERAL VEIN, PERCUTANEOUS APPROACH: ICD-10-PCS | Performed by: INTERNAL MEDICINE

## 2023-11-20 PROCEDURE — 85045 AUTOMATED RETICULOCYTE COUNT: CPT

## 2023-11-20 PROCEDURE — 36430 TRANSFUSION BLD/BLD COMPNT: CPT

## 2023-11-20 PROCEDURE — 93325 DOPPLER ECHO COLOR FLOW MAPG: CPT

## 2023-11-20 PROCEDURE — 93321 DOPPLER ECHO F-UP/LMTD STD: CPT

## 2023-11-20 PROCEDURE — 86705 HEP B CORE ANTIBODY IGM: CPT

## 2023-11-20 PROCEDURE — 85027 COMPLETE CBC AUTOMATED: CPT

## 2023-11-20 PROCEDURE — 86704 HEP B CORE ANTIBODY TOTAL: CPT

## 2023-11-20 PROCEDURE — 87517 HEPATITIS B DNA QUANT: CPT

## 2023-11-20 PROCEDURE — 82728 ASSAY OF FERRITIN: CPT

## 2023-11-20 PROCEDURE — 82103 ALPHA-1-ANTITRYPSIN TOTAL: CPT

## 2023-11-20 PROCEDURE — 83615 LACTATE (LD) (LDH) ENZYME: CPT

## 2023-11-20 PROCEDURE — 86376 MICROSOMAL ANTIBODY EACH: CPT

## 2023-11-20 PROCEDURE — 87350 HEPATITIS BE AG IA: CPT

## 2023-11-20 PROCEDURE — 86803 HEPATITIS C AB TEST: CPT

## 2023-11-20 PROCEDURE — 86038 ANTINUCLEAR ANTIBODIES: CPT

## 2023-11-20 PROCEDURE — 86901 BLOOD TYPING SEROLOGIC RH(D): CPT

## 2023-11-20 PROCEDURE — 86850 RBC ANTIBODY SCREEN: CPT

## 2023-11-20 PROCEDURE — 85018 HEMOGLOBIN: CPT

## 2023-11-20 PROCEDURE — 86900 BLOOD TYPING SEROLOGIC ABO: CPT

## 2023-11-20 PROCEDURE — 83540 ASSAY OF IRON: CPT

## 2023-11-20 PROCEDURE — 80053 COMPREHEN METABOLIC PANEL: CPT

## 2023-11-20 PROCEDURE — 82104 ALPHA-1-ANTITRYPSIN PHENO: CPT

## 2023-11-20 PROCEDURE — 85014 HEMATOCRIT: CPT

## 2023-11-20 PROCEDURE — 83516 IMMUNOASSAY NONANTIBODY: CPT

## 2023-11-20 PROCEDURE — P9016 RBC LEUKOCYTES REDUCED: HCPCS

## 2023-11-20 PROCEDURE — 86923 COMPATIBILITY TEST ELECTRIC: CPT

## 2023-11-20 PROCEDURE — 6370000000 HC RX 637 (ALT 250 FOR IP): Performed by: PHYSICIAN ASSISTANT

## 2023-11-20 PROCEDURE — 82390 ASSAY OF CERULOPLASMIN: CPT

## 2023-11-20 PROCEDURE — 6370000000 HC RX 637 (ALT 250 FOR IP): Performed by: INTERNAL MEDICINE

## 2023-11-20 PROCEDURE — 1200000000 HC SEMI PRIVATE

## 2023-11-20 PROCEDURE — 83550 IRON BINDING TEST: CPT

## 2023-11-20 PROCEDURE — 2580000003 HC RX 258: Performed by: PHYSICIAN ASSISTANT

## 2023-11-20 PROCEDURE — 36415 COLL VENOUS BLD VENIPUNCTURE: CPT

## 2023-11-20 PROCEDURE — 6370000000 HC RX 637 (ALT 250 FOR IP): Performed by: NURSE PRACTITIONER

## 2023-11-20 PROCEDURE — 83010 ASSAY OF HAPTOGLOBIN QUANT: CPT

## 2023-11-20 PROCEDURE — 86707 HEPATITIS BE ANTIBODY: CPT

## 2023-11-20 PROCEDURE — 86708 HEPATITIS A ANTIBODY: CPT

## 2023-11-20 RX ORDER — SODIUM CHLORIDE 9 MG/ML
INJECTION, SOLUTION INTRAVENOUS PRN
Status: DISCONTINUED | OUTPATIENT
Start: 2023-11-20 | End: 2023-11-28 | Stop reason: HOSPADM

## 2023-11-20 RX ADMIN — COLCHICINE 0.3 MG: 0.6 TABLET, FILM COATED ORAL at 09:17

## 2023-11-20 RX ADMIN — AMLODIPINE BESYLATE 5 MG: 5 TABLET ORAL at 09:17

## 2023-11-20 RX ADMIN — FAMOTIDINE 10 MG: 20 TABLET ORAL at 09:16

## 2023-11-20 RX ADMIN — APIXABAN 2.5 MG: 2.5 TABLET, FILM COATED ORAL at 22:22

## 2023-11-20 RX ADMIN — SEVELAMER CARBONATE 800 MG: 800 TABLET, FILM COATED ORAL at 17:52

## 2023-11-20 RX ADMIN — TAMSULOSIN HYDROCHLORIDE 0.4 MG: 0.4 CAPSULE ORAL at 09:17

## 2023-11-20 RX ADMIN — APIXABAN 2.5 MG: 2.5 TABLET, FILM COATED ORAL at 09:17

## 2023-11-20 RX ADMIN — SODIUM CHLORIDE, PRESERVATIVE FREE 10 ML: 5 INJECTION INTRAVENOUS at 09:17

## 2023-11-20 RX ADMIN — SEVELAMER CARBONATE 800 MG: 800 TABLET, FILM COATED ORAL at 09:17

## 2023-11-20 RX ADMIN — SODIUM CHLORIDE, PRESERVATIVE FREE 10 ML: 5 INJECTION INTRAVENOUS at 22:22

## 2023-11-20 RX ADMIN — ASPIRIN 81 MG 81 MG: 81 TABLET ORAL at 09:17

## 2023-11-20 ASSESSMENT — ENCOUNTER SYMPTOMS
BLOOD IN STOOL: 0
CHEST TIGHTNESS: 0
PHOTOPHOBIA: 0
FACIAL SWELLING: 0
EYE REDNESS: 0
SHORTNESS OF BREATH: 0
ABDOMINAL PAIN: 0
DIARRHEA: 0
VOMITING: 0
COUGH: 0
ABDOMINAL DISTENTION: 0
NAUSEA: 0
CONSTIPATION: 0
EYE DISCHARGE: 0

## 2023-11-20 ASSESSMENT — PAIN SCALES - GENERAL
PAINLEVEL_OUTOF10: 0

## 2023-11-20 NOTE — PLAN OF CARE
Problem: Chronic Conditions and Co-morbidities  Goal: Patient's chronic conditions and co-morbidity symptoms are monitored and maintained or improved  11/20/2023 0426 by Mireille Giraldo RN  Outcome: Progressing  Note: Echo ordered. Telemetry. Cardiology signed off, nephrology, GI and oncology following. Daily labs monitoring CBC and BMP. Goal: Serum bilirubin within specified parameters  Description: Serum bilirubin within specified parameters  11/20/2023 0426 by Mireille Giraldo RN  Outcome: Progressing     Problem: Skin/Tissue Integrity  Goal: Absence of new skin breakdown  Description: 1. Monitor for areas of redness and/or skin breakdown  2. Assess vascular access sites hourly  3. Every 4-6 hours minimum:  Change oxygen saturation probe site  4. Every 4-6 hours:  If on nasal continuous positive airway pressure, respiratory therapy assess nares and determine need for appliance change or resting period. 11/20/2023 0426 by Mireille Giraldo RN  Outcome: Progressing  Note: Wife helped pt bathe/shave and use Chlorhexidine wipes. Dressing changed performed on Port-a-cath. Will continue to monitor.       Problem: Safety - Adult  Goal: Free from fall injury  11/20/2023 0426 by Mireille Giraldo RN  Outcome: Progressing     Problem: ABCDS Injury Assessment  Goal: Absence of physical injury  11/20/2023 0426 by Mireille Giraldo RN  Outcome: Progressing     Problem: Discharge Planning  Goal: Discharge to home or other facility with appropriate resources  Outcome: Progressing     Problem: Pain  Goal: Verbalizes/displays adequate comfort level or baseline comfort level  11/20/2023 0426 by Mireille Giraldo RN  Outcome: Progressing

## 2023-11-20 NOTE — CONSENT
Informed Consent for Blood Component Transfusion Note    I have discussed with the patient the rationale for blood component transfusion; its benefits in treating or preventing fatigue, organ damage, or death; and its risk which includes mild transfusion reactions, rare risk of blood borne infection, or more serious but rare reactions. I have discussed the alternatives to transfusion, including the risk and consequences of not receiving transfusion. The patient had an opportunity to ask questions and had agreed to proceed with transfusion of blood components.     Electronically signed by Anant Baez MD on 11/20/23 at 11:22 AM EST

## 2023-11-20 NOTE — PROGRESS NOTES
Date of Admission: 11/17/2023. Hospital Day: 4       Assessment/Plan:   58 y.o. male who presented to ED for evaluation of chest pain, sob. Found to have acute pericarditis, improved with colchicine. Also had significant elevation in unconjugated troponin and alk phos. no evidence of liver mets. MRCP to be done today. Also  Has been progressively anemic without evidence of bleeding,ordered hemolytic anemia workup although low suspicion. Expected dc 11/21 if blood count and liver enzymes remain stable      Acute pericarditis : iimproving  - EKG mild but generalized ST elevation consistent with acute pericarditis. ? Viral , pt reported  cough, weakness  recently    Repeat EKG 1/19  show similar st elevations  - Troponin 147 > 124, chronically elevated in the setting of renal insufficiency, stable compared to prior troponin. No h/o CAD  -cont colcichine       Hyperbilirubinemia/acute hepatitis  Severely elevated bilirubin, total 13, direct 3 on admiison,  ALP 1500 . Staying relatively flat  , AST and ALT also elevated on admisision but trending down  Obtain hepatitis panel . Hbs Ag +, ab -ve . Could be false + from recent vaccination, d/w  GI dr Cindy Waldron, will obtain Hep B DNA levels  Ct w contrast abd -ve for liver mets  Including MRCP to rule out biliary obstn although no evidence of ductal dilatation on CT scan      Anemia, acute on chronic/ thromocytopenia  Hb trending down , 8.5 on admission, 7.2 today. Was 6 a month ago  With significantly elevated bilirubin, will obtain LDH, haptoglobin, peripheral smear  to r/o hemolytic anemia  11/20 transfuse 1 unit PRBC      Esophageal cancer, st IV  Has lung mets. Recently completed chemo   oncology consulted      ESRD on HD  - HD TTS  - Renally dose medications  - Monitor for electrolyte abnormalities  - Nephrology consulted    Active Hospital Problems    Diagnosis     Hyperbilirubinemia [E80.6]            DVT Prophylaxis:    Diet: ADULT DIET; Regular;  Low

## 2023-11-20 NOTE — PROGRESS NOTES
[facility] Note    Patient Active Problem List   Diagnosis    Malignant neoplasm of lower third of esophagus (HCC)    CINV (chemotherapy-induced nausea and vomiting)    Hematemesis    Renal mass    CKD (chronic kidney disease) stage 3, GFR 30-59 ml/min (HCC)    Hypertension    Acute kidney injury superimposed on CKD (HCC)    Hydropneumothorax    Anemia of chronic renal failure    Clear cell carcinoma of kidney (HCC)    Malignant neoplasm of kidney (HCC)    Monoclonal gammopathy of unknown significance (MGUS)    ROCAEL (acute kidney injury) ([address])    Pneumothorax, right    CKD (chronic kidney disease) stage 4, GFR 15-29 ml/min (HCC)    Pneumothorax after biopsy    Hematuria    Symptomatic anemia    Anemia    Abdominal discomfort    Abnormal CT scan    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)    Abnormal weight loss    Allergy, unspecified, initial encounter    Anaphylactic shock, unspecified, initial encounter    Back pain    Low back pain    Benign neoplasm of colon    Benign prostatic hyperplasia    Benign prostatic hyperplasia without urinary obstruction    Carpal tunnel syndrome    Chest mass    Chronic diarrhea of unknown origin    Chronic viral hepatitis C (HCC)    Coagulation defect, unspecified ([address])    Costochondritis    Dehydration    Delayed gastric emptying    Dependence on renal dialysis ([address])    Diarrhea    Disorder of kidney and ureter    Dysphagia    Entrapment of left ulnar nerve at elbow    Esophageal adenocarcinoma (HCC)    External hemorrhoids    Fatigue due to treatment    Gastroesophageal reflux disease    Sliding hiatal hernia    Mixed hyperlipidemia    Hyperlipidemia    Impotence of organic origin    Iron deficiency anemia    Iron deficiency anemia, unspecified    Liver lesion    Loss of appetite    Malignant neoplasm of unspecified part of right bronchus or lung (HCC)    Nausea    Noninfective gastroenteritis and colitis, unspecified    Other idiopathic

## 2023-11-21 ENCOUNTER — APPOINTMENT (OUTPATIENT)
Dept: MRI IMAGING | Age: 63
DRG: 374 | End: 2023-11-21
Payer: COMMERCIAL

## 2023-11-21 LAB
ALBUMIN SERPL-MCNC: 2.5 G/DL (ref 3.4–5)
ALBUMIN/GLOB SERPL: 0.7 {RATIO} (ref 1.1–2.2)
ALP SERPL-CCNC: 1476 U/L (ref 40–129)
ALT SERPL-CCNC: 46 U/L (ref 10–40)
ANA SER QL IA: NEGATIVE
ANION GAP SERPL CALCULATED.3IONS-SCNC: 17 MMOL/L (ref 3–16)
AST SERPL-CCNC: 129 U/L (ref 15–37)
BILIRUB SERPL-MCNC: 13.8 MG/DL (ref 0–1)
BUN SERPL-MCNC: 84 MG/DL (ref 7–20)
CALCIUM SERPL-MCNC: 8.2 MG/DL (ref 8.3–10.6)
CHLORIDE SERPL-SCNC: 96 MMOL/L (ref 99–110)
CO2 SERPL-SCNC: 22 MMOL/L (ref 21–32)
CREAT SERPL-MCNC: 6.8 MG/DL (ref 0.8–1.3)
DEPRECATED RDW RBC AUTO: 19.5 % (ref 12.4–15.4)
GFR SERPLBLD CREATININE-BSD FMLA CKD-EPI: 8 ML/MIN/{1.73_M2}
GLUCOSE BLD-MCNC: 189 MG/DL (ref 70–99)
GLUCOSE SERPL-MCNC: 73 MG/DL (ref 70–99)
HBV CORE IGM SERPL QL IA: NORMAL
HBV E AB SERPL QL IA: NEGATIVE
HBV E AG SERPL QL IA: NEGATIVE
HCT VFR BLD AUTO: 24.2 % (ref 40.5–52.5)
HCV AB SERPL QL IA: NORMAL
HGB BLD-MCNC: 8.4 G/DL (ref 13.5–17.5)
MCH RBC QN AUTO: 30.9 PG (ref 26–34)
MCHC RBC AUTO-ENTMCNC: 34.8 G/DL (ref 31–36)
MCV RBC AUTO: 88.9 FL (ref 80–100)
PATH INTERP BLD-IMP: NORMAL
PERFORMED ON: ABNORMAL
PLATELET # BLD AUTO: 189 K/UL (ref 135–450)
PMV BLD AUTO: 8.2 FL (ref 5–10.5)
POTASSIUM SERPL-SCNC: 4.9 MMOL/L (ref 3.5–5.1)
PROT SERPL-MCNC: 6.2 G/DL (ref 6.4–8.2)
RBC # BLD AUTO: 2.73 M/UL (ref 4.2–5.9)
SODIUM SERPL-SCNC: 135 MMOL/L (ref 136–145)
WBC # BLD AUTO: 9.3 K/UL (ref 4–11)

## 2023-11-21 PROCEDURE — 6370000000 HC RX 637 (ALT 250 FOR IP): Performed by: NURSE PRACTITIONER

## 2023-11-21 PROCEDURE — 6370000000 HC RX 637 (ALT 250 FOR IP): Performed by: PHYSICIAN ASSISTANT

## 2023-11-21 PROCEDURE — 1200000000 HC SEMI PRIVATE

## 2023-11-21 PROCEDURE — 74181 MRI ABDOMEN W/O CONTRAST: CPT

## 2023-11-21 PROCEDURE — 80053 COMPREHEN METABOLIC PANEL: CPT

## 2023-11-21 PROCEDURE — 2580000003 HC RX 258: Performed by: PHYSICIAN ASSISTANT

## 2023-11-21 PROCEDURE — 6370000000 HC RX 637 (ALT 250 FOR IP): Performed by: INTERNAL MEDICINE

## 2023-11-21 PROCEDURE — 85027 COMPLETE CBC AUTOMATED: CPT

## 2023-11-21 RX ADMIN — SEVELAMER CARBONATE 800 MG: 800 TABLET, FILM COATED ORAL at 16:11

## 2023-11-21 RX ADMIN — SODIUM CHLORIDE, PRESERVATIVE FREE 10 ML: 5 INJECTION INTRAVENOUS at 20:06

## 2023-11-21 RX ADMIN — FAMOTIDINE 10 MG: 20 TABLET ORAL at 16:10

## 2023-11-21 RX ADMIN — AMLODIPINE BESYLATE 5 MG: 5 TABLET ORAL at 16:11

## 2023-11-21 RX ADMIN — TAMSULOSIN HYDROCHLORIDE 0.4 MG: 0.4 CAPSULE ORAL at 16:11

## 2023-11-21 RX ADMIN — COLCHICINE 0.3 MG: 0.6 TABLET, FILM COATED ORAL at 16:11

## 2023-11-21 RX ADMIN — ASPIRIN 81 MG 81 MG: 81 TABLET ORAL at 16:11

## 2023-11-21 RX ADMIN — SEVELAMER CARBONATE 800 MG: 800 TABLET, FILM COATED ORAL at 11:32

## 2023-11-21 ASSESSMENT — ENCOUNTER SYMPTOMS
DIARRHEA: 0
CHEST TIGHTNESS: 0
PHOTOPHOBIA: 0
NAUSEA: 0
BLOOD IN STOOL: 0
EYE REDNESS: 0
EYE DISCHARGE: 0
VOMITING: 0
COUGH: 0
ABDOMINAL PAIN: 0
FACIAL SWELLING: 0
CONSTIPATION: 0
ABDOMINAL DISTENTION: 0
SHORTNESS OF BREATH: 0

## 2023-11-21 ASSESSMENT — PAIN SCALES - GENERAL
PAINLEVEL_OUTOF10: 0

## 2023-11-21 NOTE — PROGRESS NOTES
with no obstruction. Mild ascites in the abdomen and pelvis with questionable mild edema and   stranding throughout the mesentery which may be related to the 3rd spacing of   fluid or ascites which is limiting the exam and is more prominent. Mild prostatic enlargement with poor visualization of the bladder and no   obvious pelvic mass or active inflammation can be seen      Probable metastatic along T12 and L1 and a questionable mild pathologic   compression fracture of T12 which is unchanged. Tiny pulmonary nodules along the lung bases which are unchanged. There is   could be due to metastatic disease. XR CHEST PORTABLE   Final Result   1. Small left pleural effusion with mild left basilar airspace disease.              Gina Bowden MD

## 2023-11-21 NOTE — PLAN OF CARE
Problem: Pain  Goal: Verbalizes/displays adequate comfort level or baseline comfort level  11/21/2023 1052 by Garry Vazquez RN  Outcome: Not Progressing     Problem: Pain  Goal: Verbalizes/displays adequate comfort level or baseline comfort level  11/21/2023 1052 by Garry Vazquez RN  Outcome: Not Progressing      Problem: Safety - Adult  Goal: Free from fall injury  11/21/2023 1052 by Garry Vazquez RN  Outcome: Progressing     Problem: Chronic Conditions and Co-morbidities  Goal: Patient's chronic conditions and co-morbidity symptoms are monitored and maintained or improved  11/21/2023 1052 by Garry Vazquez RN  Outcome: Progressing

## 2023-11-21 NOTE — CARE COORDINATION
Discharge Planning;  CM received a call from [phone] [place] [address] patient's CM with Demi inquiring about  patient's discharge plan. She stated to be contacted with any discharge needs on [phone].

## 2023-11-21 NOTE — PROGRESS NOTES
[facility] Note    Patient Active Problem List   Diagnosis    Malignant neoplasm of lower third of esophagus (HCC)    CINV (chemotherapy-induced nausea and vomiting)    Hematemesis    Renal mass    CKD (chronic kidney disease) stage 3, GFR 30-59 ml/min (HCC)    Hypertension    Acute kidney injury superimposed on CKD (HCC)    Hydropneumothorax    Anemia of chronic renal failure    Clear cell carcinoma of kidney (HCC)    Malignant neoplasm of kidney (HCC)    Monoclonal gammopathy of unknown significance (MGUS)    ROCAEL (acute kidney injury) ([address])    Pneumothorax, right    CKD (chronic kidney disease) stage 4, GFR 15-29 ml/min (HCC)    Pneumothorax after biopsy    Hematuria    Symptomatic anemia    Anemia    Abdominal discomfort    Abnormal CT scan    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)    Abnormal weight loss    Allergy, unspecified, initial encounter    Anaphylactic shock, unspecified, initial encounter    Back pain    Low back pain    Benign neoplasm of colon    Benign prostatic hyperplasia    Benign prostatic hyperplasia without urinary obstruction    Carpal tunnel syndrome    Chest mass    Chronic diarrhea of unknown origin    Chronic viral hepatitis C (HCC)    Coagulation defect, unspecified ([address])    Costochondritis    Dehydration    Delayed gastric emptying    Dependence on renal dialysis ([address])    Diarrhea    Disorder of kidney and ureter    Dysphagia    Entrapment of left ulnar nerve at elbow    Esophageal adenocarcinoma (HCC)    External hemorrhoids    Fatigue due to treatment    Gastroesophageal reflux disease    Sliding hiatal hernia    Mixed hyperlipidemia    Hyperlipidemia    Impotence of organic origin    Iron deficiency anemia    Iron deficiency anemia, unspecified    Liver lesion    Loss of appetite    Malignant neoplasm of unspecified part of right bronchus or lung (HCC)    Nausea    Noninfective gastroenteritis and colitis, unspecified    Other idiopathic

## 2023-11-22 ENCOUNTER — APPOINTMENT (OUTPATIENT)
Dept: GENERAL RADIOLOGY | Age: 63
DRG: 374 | End: 2023-11-22
Payer: COMMERCIAL

## 2023-11-22 ENCOUNTER — ANESTHESIA EVENT (OUTPATIENT)
Dept: ENDOSCOPY | Age: 63
End: 2023-11-22
Payer: COMMERCIAL

## 2023-11-22 ENCOUNTER — ANESTHESIA (OUTPATIENT)
Dept: ENDOSCOPY | Age: 63
End: 2023-11-22
Payer: COMMERCIAL

## 2023-11-22 LAB
ALBUMIN SERPL-MCNC: 2.5 G/DL (ref 3.4–5)
ALBUMIN/GLOB SERPL: 0.7 {RATIO} (ref 1.1–2.2)
ALP SERPL-CCNC: 1555 U/L (ref 40–129)
ALT SERPL-CCNC: 47 U/L (ref 10–40)
ANION GAP SERPL CALCULATED.3IONS-SCNC: 11 MMOL/L (ref 3–16)
ANION GAP SERPL CALCULATED.3IONS-SCNC: 17 MMOL/L (ref 3–16)
AST SERPL-CCNC: 132 U/L (ref 15–37)
BILIRUB SERPL-MCNC: 16 MG/DL (ref 0–1)
BUN SERPL-MCNC: 110 MG/DL (ref 7–20)
BUN SERPL-MCNC: 48 MG/DL (ref 7–20)
CALCIUM SERPL-MCNC: 7.9 MG/DL (ref 8.3–10.6)
CALCIUM SERPL-MCNC: 8.3 MG/DL (ref 8.3–10.6)
CHLORIDE SERPL-SCNC: 93 MMOL/L (ref 99–110)
CHLORIDE SERPL-SCNC: 96 MMOL/L (ref 99–110)
CO2 SERPL-SCNC: 21 MMOL/L (ref 21–32)
CO2 SERPL-SCNC: 24 MMOL/L (ref 21–32)
CREAT SERPL-MCNC: 3.8 MG/DL (ref 0.8–1.3)
CREAT SERPL-MCNC: 7.8 MG/DL (ref 0.8–1.3)
DEPRECATED RDW RBC AUTO: 20.1 % (ref 12.4–15.4)
GFR SERPLBLD CREATININE-BSD FMLA CKD-EPI: 17 ML/MIN/{1.73_M2}
GFR SERPLBLD CREATININE-BSD FMLA CKD-EPI: 7 ML/MIN/{1.73_M2}
GLUCOSE BLD-MCNC: 103 MG/DL (ref 70–99)
GLUCOSE BLD-MCNC: 68 MG/DL (ref 70–99)
GLUCOSE BLD-MCNC: 73 MG/DL (ref 70–99)
GLUCOSE BLD-MCNC: 80 MG/DL (ref 70–99)
GLUCOSE BLD-MCNC: 87 MG/DL (ref 70–99)
GLUCOSE SERPL-MCNC: 148 MG/DL (ref 70–99)
GLUCOSE SERPL-MCNC: 73 MG/DL (ref 70–99)
HAV AB SER QL IA: NEGATIVE
HBV CORE AB SERPL QL IA: NEGATIVE
HCT VFR BLD AUTO: 28.8 % (ref 40.5–52.5)
HGB BLD-MCNC: 10.1 G/DL (ref 13.5–17.5)
MCH RBC QN AUTO: 31 PG (ref 26–34)
MCHC RBC AUTO-ENTMCNC: 34.9 G/DL (ref 31–36)
MCV RBC AUTO: 88.7 FL (ref 80–100)
MISCELLANEOUS LAB TEST ORDER: NORMAL
PERFORMED ON: ABNORMAL
PERFORMED ON: ABNORMAL
PERFORMED ON: NORMAL
PLATELET # BLD AUTO: 196 K/UL (ref 135–450)
PMV BLD AUTO: 7.9 FL (ref 5–10.5)
POTASSIUM SERPL-SCNC: 3.5 MMOL/L (ref 3.5–5.1)
POTASSIUM SERPL-SCNC: 5.7 MMOL/L (ref 3.5–5.1)
PROT SERPL-MCNC: 6.3 G/DL (ref 6.4–8.2)
RBC # BLD AUTO: 3.25 M/UL (ref 4.2–5.9)
SMA IGG SER-ACNC: 5 UNITS (ref 0–19)
SODIUM SERPL-SCNC: 131 MMOL/L (ref 136–145)
SODIUM SERPL-SCNC: 131 MMOL/L (ref 136–145)
WBC # BLD AUTO: 8.1 K/UL (ref 4–11)

## 2023-11-22 PROCEDURE — 88104 CYTOPATH FL NONGYN SMEARS: CPT

## 2023-11-22 PROCEDURE — 85027 COMPLETE CBC AUTOMATED: CPT

## 2023-11-22 PROCEDURE — 0F768DZ DILATION OF LEFT HEPATIC DUCT WITH INTRALUMINAL DEVICE, VIA NATURAL OR ARTIFICIAL OPENING ENDOSCOPIC: ICD-10-PCS | Performed by: INTERNAL MEDICINE

## 2023-11-22 PROCEDURE — 2580000003 HC RX 258: Performed by: STUDENT IN AN ORGANIZED HEALTH CARE EDUCATION/TRAINING PROGRAM

## 2023-11-22 PROCEDURE — 1200000000 HC SEMI PRIVATE

## 2023-11-22 PROCEDURE — 90935 HEMODIALYSIS ONE EVALUATION: CPT

## 2023-11-22 PROCEDURE — 0F798DZ DILATION OF COMMON BILE DUCT WITH INTRALUMINAL DEVICE, VIA NATURAL OR ARTIFICIAL OPENING ENDOSCOPIC: ICD-10-PCS | Performed by: INTERNAL MEDICINE

## 2023-11-22 PROCEDURE — 2500000003 HC RX 250 WO HCPCS

## 2023-11-22 PROCEDURE — 3609012700 HC EGD DILATION SAVORY: Performed by: INTERNAL MEDICINE

## 2023-11-22 PROCEDURE — 88305 TISSUE EXAM BY PATHOLOGIST: CPT

## 2023-11-22 PROCEDURE — 3609015100 HC ERCP STENT PLACEMENT BILIARY/PANCREATIC DUCT: Performed by: INTERNAL MEDICINE

## 2023-11-22 PROCEDURE — 7100000001 HC PACU RECOVERY - ADDTL 15 MIN: Performed by: INTERNAL MEDICINE

## 2023-11-22 PROCEDURE — 7100000000 HC PACU RECOVERY - FIRST 15 MIN: Performed by: INTERNAL MEDICINE

## 2023-11-22 PROCEDURE — 74330 X-RAY BILE/PANC ENDOSCOPY: CPT

## 2023-11-22 PROCEDURE — 0FC98ZZ EXTIRPATION OF MATTER FROM COMMON BILE DUCT, VIA NATURAL OR ARTIFICIAL OPENING ENDOSCOPIC: ICD-10-PCS | Performed by: INTERNAL MEDICINE

## 2023-11-22 PROCEDURE — C2617 STENT, NON-COR, TEM W/O DEL: HCPCS | Performed by: INTERNAL MEDICINE

## 2023-11-22 PROCEDURE — 3700000000 HC ANESTHESIA ATTENDED CARE: Performed by: INTERNAL MEDICINE

## 2023-11-22 PROCEDURE — 80053 COMPREHEN METABOLIC PANEL: CPT

## 2023-11-22 PROCEDURE — 6360000002 HC RX W HCPCS

## 2023-11-22 PROCEDURE — 2580000003 HC RX 258

## 2023-11-22 PROCEDURE — C1753 CATH, INTRAVAS ULTRASOUND: HCPCS | Performed by: INTERNAL MEDICINE

## 2023-11-22 PROCEDURE — 0F7D8DZ DILATION OF PANCREATIC DUCT WITH INTRALUMINAL DEVICE, VIA NATURAL OR ARTIFICIAL OPENING ENDOSCOPIC: ICD-10-PCS | Performed by: INTERNAL MEDICINE

## 2023-11-22 PROCEDURE — 6370000000 HC RX 637 (ALT 250 FOR IP): Performed by: PHYSICIAN ASSISTANT

## 2023-11-22 PROCEDURE — 2720000010 HC SURG SUPPLY STERILE: Performed by: INTERNAL MEDICINE

## 2023-11-22 PROCEDURE — 2709999900 HC NON-CHARGEABLE SUPPLY: Performed by: INTERNAL MEDICINE

## 2023-11-22 PROCEDURE — 3700000001 HC ADD 15 MINUTES (ANESTHESIA): Performed by: INTERNAL MEDICINE

## 2023-11-22 PROCEDURE — 88112 CYTOPATH CELL ENHANCE TECH: CPT

## 2023-11-22 PROCEDURE — 3609018800 HC ERCP DX COLLECTION SPECIMEN BRUSHING/WASHING: Performed by: INTERNAL MEDICINE

## 2023-11-22 PROCEDURE — 2580000003 HC RX 258: Performed by: INTERNAL MEDICINE

## 2023-11-22 PROCEDURE — 6370000000 HC RX 637 (ALT 250 FOR IP): Performed by: INTERNAL MEDICINE

## 2023-11-22 PROCEDURE — 6360000002 HC RX W HCPCS: Performed by: INTERNAL MEDICINE

## 2023-11-22 PROCEDURE — 2580000003 HC RX 258: Performed by: PHYSICIAN ASSISTANT

## 2023-11-22 PROCEDURE — BF111ZZ FLUOROSCOPY OF BILIARY AND PANCREATIC DUCTS USING LOW OSMOLAR CONTRAST: ICD-10-PCS | Performed by: INTERNAL MEDICINE

## 2023-11-22 PROCEDURE — 6360000004 HC RX CONTRAST MEDICATION: Performed by: INTERNAL MEDICINE

## 2023-11-22 DEVICE — BILIARY STENT
Type: IMPLANTABLE DEVICE | Status: FUNCTIONAL
Brand: ADVANIX™ BILIARY

## 2023-11-22 DEVICE — ZIMMON PANCREATIC STENT
Type: IMPLANTABLE DEVICE | Status: FUNCTIONAL
Brand: ZIMMON

## 2023-11-22 RX ORDER — VASOPRESSIN 20 U/ML
INJECTION PARENTERAL PRN
Status: DISCONTINUED | OUTPATIENT
Start: 2023-11-22 | End: 2023-11-22 | Stop reason: SDUPTHER

## 2023-11-22 RX ORDER — SUCCINYLCHOLINE CHLORIDE 20 MG/ML
INJECTION INTRAMUSCULAR; INTRAVENOUS PRN
Status: DISCONTINUED | OUTPATIENT
Start: 2023-11-22 | End: 2023-11-22 | Stop reason: SDUPTHER

## 2023-11-22 RX ORDER — PROPOFOL 10 MG/ML
INJECTION, EMULSION INTRAVENOUS CONTINUOUS PRN
Status: DISCONTINUED | OUTPATIENT
Start: 2023-11-22 | End: 2023-11-22 | Stop reason: SDUPTHER

## 2023-11-22 RX ORDER — LEVOFLOXACIN 5 MG/ML
500 INJECTION, SOLUTION INTRAVENOUS
Status: DISCONTINUED | OUTPATIENT
Start: 2023-11-24 | End: 2023-11-28 | Stop reason: HOSPADM

## 2023-11-22 RX ORDER — SODIUM CHLORIDE 9 MG/ML
INJECTION, SOLUTION INTRAVENOUS CONTINUOUS PRN
Status: DISCONTINUED | OUTPATIENT
Start: 2023-11-22 | End: 2023-11-22 | Stop reason: SDUPTHER

## 2023-11-22 RX ORDER — GLYCOPYRROLATE 0.2 MG/ML
INJECTION INTRAMUSCULAR; INTRAVENOUS PRN
Status: DISCONTINUED | OUTPATIENT
Start: 2023-11-22 | End: 2023-11-22 | Stop reason: SDUPTHER

## 2023-11-22 RX ORDER — PROPOFOL 10 MG/ML
INJECTION, EMULSION INTRAVENOUS PRN
Status: DISCONTINUED | OUTPATIENT
Start: 2023-11-22 | End: 2023-11-22 | Stop reason: SDUPTHER

## 2023-11-22 RX ORDER — LEVOFLOXACIN 5 MG/ML
750 INJECTION, SOLUTION INTRAVENOUS ONCE
Status: COMPLETED | OUTPATIENT
Start: 2023-11-22 | End: 2023-11-22

## 2023-11-22 RX ADMIN — FAMOTIDINE 10 MG: 20 TABLET ORAL at 12:06

## 2023-11-22 RX ADMIN — PROPOFOL 150 MCG/KG/MIN: 10 INJECTION, EMULSION INTRAVENOUS at 14:20

## 2023-11-22 RX ADMIN — SUCCINYLCHOLINE CHLORIDE 100 MG: 20 INJECTION, SOLUTION INTRAMUSCULAR; INTRAVENOUS at 14:39

## 2023-11-22 RX ADMIN — GLYCOPYRROLATE 0.2 MG: 0.2 INJECTION, SOLUTION INTRAMUSCULAR; INTRAVENOUS at 14:28

## 2023-11-22 RX ADMIN — TAMSULOSIN HYDROCHLORIDE 0.4 MG: 0.4 CAPSULE ORAL at 12:06

## 2023-11-22 RX ADMIN — VASOPRESSIN 1 UNITS: 20 INJECTION INTRAVENOUS at 15:40

## 2023-11-22 RX ADMIN — SODIUM CHLORIDE: 9 INJECTION, SOLUTION INTRAVENOUS at 14:16

## 2023-11-22 RX ADMIN — VASOPRESSIN 0.5 UNITS: 20 INJECTION INTRAVENOUS at 15:23

## 2023-11-22 RX ADMIN — LEVOFLOXACIN 750 MG: 5 INJECTION, SOLUTION INTRAVENOUS at 16:30

## 2023-11-22 RX ADMIN — PHENYLEPHRINE HYDROCHLORIDE 200 MCG: 10 INJECTION INTRAVENOUS at 15:09

## 2023-11-22 RX ADMIN — VASOPRESSIN 0.5 UNITS: 20 INJECTION INTRAVENOUS at 15:27

## 2023-11-22 RX ADMIN — PHENYLEPHRINE HYDROCHLORIDE 100 MCG: 10 INJECTION INTRAVENOUS at 15:18

## 2023-11-22 RX ADMIN — PHENYLEPHRINE HYDROCHLORIDE 200 MCG: 10 INJECTION INTRAVENOUS at 14:41

## 2023-11-22 RX ADMIN — DEXTROSE MONOHYDRATE 125 ML: 100 INJECTION, SOLUTION INTRAVENOUS at 11:16

## 2023-11-22 RX ADMIN — AMLODIPINE BESYLATE 5 MG: 5 TABLET ORAL at 12:06

## 2023-11-22 RX ADMIN — SODIUM CHLORIDE, PRESERVATIVE FREE 10 ML: 5 INJECTION INTRAVENOUS at 20:27

## 2023-11-22 RX ADMIN — PROPOFOL 50 MG: 10 INJECTION, EMULSION INTRAVENOUS at 14:23

## 2023-11-22 RX ADMIN — PHENYLEPHRINE HYDROCHLORIDE 100 MCG: 10 INJECTION INTRAVENOUS at 14:59

## 2023-11-22 RX ADMIN — SEVELAMER CARBONATE 800 MG: 800 TABLET, FILM COATED ORAL at 17:51

## 2023-11-22 RX ADMIN — PHENYLEPHRINE HYDROCHLORIDE 100 MCG: 10 INJECTION INTRAVENOUS at 15:03

## 2023-11-22 RX ADMIN — SODIUM CHLORIDE, PRESERVATIVE FREE 10 ML: 5 INJECTION INTRAVENOUS at 12:08

## 2023-11-22 RX ADMIN — PROPOFOL 20 MG: 10 INJECTION, EMULSION INTRAVENOUS at 14:39

## 2023-11-22 ASSESSMENT — ENCOUNTER SYMPTOMS
CHEST TIGHTNESS: 0
EYE DISCHARGE: 0
CONSTIPATION: 0
FACIAL SWELLING: 0
SHORTNESS OF BREATH: 1
PHOTOPHOBIA: 0
ABDOMINAL DISTENTION: 0
DIARRHEA: 0
EYE REDNESS: 0
COUGH: 0
SHORTNESS OF BREATH: 0
NAUSEA: 0
VOMITING: 0
BLOOD IN STOOL: 0
ABDOMINAL PAIN: 0

## 2023-11-22 ASSESSMENT — PAIN SCALES - GENERAL
PAINLEVEL_OUTOF10: 10
PAINLEVEL_OUTOF10: 0
PAINLEVEL_OUTOF10: 10

## 2023-11-22 ASSESSMENT — PAIN DESCRIPTION - LOCATION
LOCATION: THROAT
LOCATION: THROAT

## 2023-11-22 ASSESSMENT — PAIN DESCRIPTION - ORIENTATION: ORIENTATION: MID

## 2023-11-22 ASSESSMENT — LIFESTYLE VARIABLES: SMOKING_STATUS: 1

## 2023-11-22 NOTE — PROGRESS NOTES
Blood sugar 68. Pt states he sees double. Blood sugar replaced per protocol. Will cont to monitor. Dialysis nurse at bedside.

## 2023-11-22 NOTE — PROGRESS NOTES
Assessment completed and medications given. Patient is A&O and denies any needs at this time. Call light and personal belongings are within reach. Family at bedside/  Standard safety measures in place.

## 2023-11-22 NOTE — FLOWSHEET NOTE
11/21/23 1955   Assessment   Charting Type Shift assessment   Psychosocial   Psychosocial (WDL) WDL   Neurological   Neuro (WDL) X   Level of Consciousness 0   Orientation Level Oriented X4   Cognition Follows commands; Appropriate judgement; Appropriate safety awareness; Appropriate attention/concentration   Speech Clear   Uri Coma Scale   Eye Opening 4   Best Verbal Response 5   Best Motor Response 6   [place] Coma Scale Score 15   HEENT (Head, Ears, Eyes, Nose, & Throat)   HEENT (WDL) X   Right Eye Sclera yellow;Glasses; Impaired vision   Left Eye Sclera yellow;Glasses; Impaired vision   Teeth Dentures upper   Respiratory   Respiratory (WDL) WDL   Respiratory Interventions H.O.B. elevated   Respiratory Pattern Regular   Respiratory Depth Normal   Respiratory Quality/Effort Unlabored;Dyspnea with exertion   Chest Assessment Chest expansion symmetrical   Breath Sounds   Right Upper Lobe Clear   Right Middle Lobe Diminished   Right Lower Lobe Diminished   Left Upper Lobe Clear   Left Lower Lobe Diminished   Cardiac   Cardiac (WDL) X   Cardiac Regularity Regular   Heart Sounds Murmur   Cardiac Rhythm Sinus rhythm   Cardiac Symptoms Chest pain   Rhythm Interpretation   Pulse 73   Cardiac Monitor   Alarm Audible Centralized cardiac monitoring   Alarms Set Centralized cardiac monitoring   Cardiac/Telemetry Monitor On Portable telemetry pack applied   Gastrointestinal   Abdominal (WDL) X   Abdomen Inspection Flat   RUQ Bowel Sounds Hypoactive   LUQ Bowel Sounds Hypoactive   RLQ Bowel Sounds Hypoactive   LLQ Bowel Sounds Hypoactive   Peripheral Vascular   Peripheral Vascular (WDL) X   Edema Right lower extremity; Left lower extremity   RLE Edema Trace   LLE Edema Trace   Skin Integumentary    Skin Integumentary (WDL) WDL   Musculoskeletal   Musculoskeletal (WDL) X   RUE Weakness   LUE Weakness   RL Extremity Weakness   LL Extremity Weakness

## 2023-11-22 NOTE — PROGRESS NOTES
Pt arrived from Endo to PACU bay 2. Report received from Endo staff. Pt arousable to voice. Pt on 6l 02, sb, and VSS. Will continue to monitor.

## 2023-11-22 NOTE — PROGRESS NOTES
Treatment time: 3 hours  Net UF: 1000 ml     Pre weight: 52.7 kg  Post weight:51.7 kg      Access used: R-Femoral  Catheter  Access function: well with  ml/min     Medications or blood products given: None          Summary of response to treatment: Patient tolerated treatment well and without any complications. Patient remained stable throughout entire treatment. Report given to Emely Velazquez RN and copy of dialysis treatment record placed in chart, to be scanned into EMR.

## 2023-11-22 NOTE — PROGRESS NOTES
Pt returned from endo. A&o x4, drowsy. Vss. Fall precautions in place.  Bed low, call bell in reach, instructed to call for assist.

## 2023-11-22 NOTE — PROGRESS NOTES
Pt transferred to room 5918 at this time. A&O with no signs of distress. Tele on, with visual on monitor. Report given to [facility] RN. V/u and denies questions or further needs at this time.

## 2023-11-22 NOTE — PROGRESS NOTES
Pt stable and able to be transferred from PACU to room 5918. A&O , VSS, with no complaints at this time. 5C called and notified that pt is being transferred out of PACU and to room.

## 2023-11-22 NOTE — ANESTHESIA POSTPROCEDURE EVALUATION
Department of Anesthesiology  Postprocedure Note    Patient: Dorcas Johnson  MRN: [id number]  YOB: 1960  Date of evaluation: 11/22/2023      Procedure Summary       Date: 11/22/23 Room / Location: [address] [place] [address]    Anesthesia Start: 1416 Anesthesia Stop: 3238    Procedures:       ERCP STENT INSERTION      EGD DILATION SAVORY      ERCP BILIARY BRUSHING Diagnosis:       Abnormal magnetic resonance cholangiopancreatography (MRCP)      (Abnormal magnetic resonance cholangiopancreatography (MRCP) [R93.3])    Surgeons: Yan Selby MD Responsible Provider: Heavenly Gtz MD    Anesthesia Type: general ASA Status: 4            Anesthesia Type: No value filed.     Amado Phase I: Amado Score: 9    Amado Phase II:        Anesthesia Post Evaluation    Patient location during evaluation: PACU  Patient participation: complete - patient participated  Level of consciousness: awake and alert  Airway patency: patent  Nausea & Vomiting: no nausea and no vomiting  Complications: no  Cardiovascular status: blood pressure returned to baseline  Respiratory status: acceptable  Hydration status: euvolemic  Multimodal analgesia pain management approach  Pain management: adequate

## 2023-11-22 NOTE — OP NOTE
Endoscopy Note    Patient: Manuel Conde   : 1960  Acct#:     Procedure: Endoscopic ultrasound  EGD with savary dilation  ERCP with biliary sphincterotomy  ERCP with stone extraction  ERCP with brushings  ERCP with pancreatic stent placement  ERCP with bile duct stent placement  Interpretation of fluoroscopy      Pre-op diagnosis: Obstructive jaundice  Post-op diagnosis: 1. Bile duct stricture. 2. Bile duct stone    Surgeon:  Josué Forbes MD    Anesthesia:  MAC per Anesthesia. Indications: This is a 58y.o. year old male who presents today with metastatic esophageal cancer diagnosed in  and had esophagectomy. Has been on a chemotherapy holiday since August of this year. Presents now with jaundice. MRCP shows a normal distal bile duct and concern for a stricture at the level of the proximal or mid common bile duct. Planning EGD and EUS diagnostically and ERCP with biliary stenting. Consent: Risks, benefits, and alternatives were explained and informed consent was obtained. Monitoring:  Patient was monitored with continuous pulse oximetry, telemetry, and intermittent blood pressures. Details of the Procedure: The patient was then taken to the endoscopy suite. A time-out was performed. The patient and staff were in agreement as to the correct patient and procedure. The above anesthesia was administered by the anesthesia department. The patient was placed in the left lateral position. The Olympus videoendoscope was placed in the patient's mouth and under direct visualization passed into the esophagus and advanced without difficulty to the 2nd portion of the duodenum. Views were good, patient toleration was good. Retroflexion was performed in the stomach. Findings:  1. The esophagus showed the prior esophagectomy. The esophagogastric anastamosis was wide open.     2.  Stomach was normal.    3.  Duodenum was normal.     Next, the curvilinear array echoendoscope was

## 2023-11-22 NOTE — ANESTHESIA PRE PROCEDURE
smoker (+mja)    (-) COPD, asthma and sleep apnea                           Cardiovascular:    (+) hypertension:, valvular problems/murmurs: AI, hyperlipidemia    (-) past MI, CAD (Acute pericarditis, improving), CABG/stent, dysrhythmias,  angina and  CHF (echo 11/23 EF 50-55 mild ai no rwma  Large left pleural effusion)    ECG reviewed  Rhythm: regular  Rate: normal  Echocardiogram reviewed                  Neuro/Psych:   (+) neuromuscular disease (peripheral neuropathy):   (-) seizures, TIA and CVA           GI/Hepatic/Renal:   (+) hiatal hernia, GERD (History of esophageal cancer stage IV):, hepatitis (Hyperbilirubinemia/acute hepatitis): C, renal disease (HD today): dialysis and ESRD,           Endo/Other:    (+) blood dyscrasia (ANCA, MGUS)::..    (-) diabetes mellitus, hypothyroidism, hyperthyroidism               Abdominal:             Vascular: Other Findings:           Anesthesia Plan      general     ASA 4       Induction: intravenous. MIPS: Postoperative opioids intended and Prophylactic antiemetics administered. Anesthetic plan and risks discussed with patient. Plan discussed with CRNA.                     Ahsan Islas MD   11/22/2023

## 2023-11-22 NOTE — H&P
Pre-operative History and Physical    Patient: April Ron  : 1960  Acct#:     HISTORY OF PRESENT ILLNESS:    The patient is a 58 y.o. male who presents with metastatic esophageal cancer diagnosed in  and had esophagectomy. Has been on a chemotherapy holiday since August of this year. Presents now with jaundice. MRCP shows a normal distal bile duct and concern for a stricture at the level of the proximal or mid common bile duct. Planning EGD and EUS diagnostically and ERCP with biliary stenting.       Past Medical History:        Diagnosis Date    Anesthesia     hypertension with anesthesia    Chronic diarrhea     patient reports chronic diarrhea for 1 year and sees a specialist at [facility]: has been checked for c.dif multiple times and always negative    Esophageal cancer ([address])      and diagnosed again : started chemotherapy  23: on 2 different chemo treatment one is every 2 weeks and the other treatment is every 3 weeks    Hyperlipidemia     Kidney disease     Dialysis on  and     Lung cancer [facility])     recent diagnosis     Neuropathy     Prostate enlargement       Past Surgical History:        Procedure Laterality Date    BLADDER SURGERY Left 2023    CYSTOSCOPY WITH LEFT STENT REMOVAL performed by Gabriel Claros MD at [facility] N/A 2022    COLONOSCOPY WITH BIOPSY performed by Michael Olivarez MD at [address] [place] [address]  2023    CT GUIDED CHEST TUBE 2023 Robin Bains MD [facility] CT SCAN    CT NEEDLE BIOPSY LUNG PERCUTANEOUS  2023    CT NEEDLE BIOPSY LUNG PERCUTANEOUS 2023 Robin Bains MD [facility] CT SCAN    CYSTOSCOPY Left 2023    CYSTOSCOPY, LEFT RETROGRADE PYELOGRAM, PLACEMENT OF LEFT URETERAL STENT performed by Gabriel Claros MD at [facility]      for cancer: 2017    IR PORT PLACEMENT EQUAL OR GREATER THAN 5 YEARS  2022    IR PORT PLACEMENT
Code    Consults:  IP CONSULT TO GI  IP CONSULT TO CARDIOLOGY  IP CONSULT TO NEPHROLOGY  IP CONSULT TO HEM/ONC    Disposition: Admit to Inpatient   ELOS: Greater than two midnights due to medical therapy     Bob Han PA-C

## 2023-11-22 NOTE — PROGRESS NOTES
Date of Admission: 11/17/2023. Hospital Day: 6       Assessment/Plan:   58 y.o. male who presented to ED for evaluation of chest pain, sob. Found to have acute pericarditis, improved with colchicine. Also had significant elevation in unconjugated troponin and alk phos. no evidence of liver mets. MRCP to be done today. Also  Has been progressively anemic without evidence of bleeding,ordered hemolytic anemia workup although low suspicion. Expected dc 11/21 if blood count and liver enzymes remain stable      Acute pericarditis : iimproving  - EKG mild but generalized ST elevation consistent with acute pericarditis. ? Viral , pt reported  cough, weakness  recently    Repeat EKG 1/19  show similar st elevations  - Troponin 147 > 124, chronically elevated in the setting of renal insufficiency, stable compared to prior troponin. No h/o CAD  -cont colcichine       Hyperbilirubinemia/acute hepatitis  Severely elevated bilirubin, total 13, direct 3 on admiison,  ALP 1500 . Staying relatively flat  , AST and ALT also elevated on admisision but trending down  Obtain hepatitis panel . Hbs Ag +, ab -ve . Could be false + from recent vaccination, d/w  GI dr Yves Roper, will obtain Hep B DNA levels  Ct w contrast abd -ve for liver mets  MRCP with intrahepatic biliary dilation and normal distal CBD concerning for CBD stricture at mid or proximal CBD. Plan for ERCP today      Anemia, acute on chronic/ thromocytopenia  - Stable  Hb trending down , 8.5 on admission, 10.1 today. Was 6 a month ago  With significantly elevated bilirubin, will obtain LDH, haptoglobin, peripheral smear  to r/o hemolytic anemia  11/20 transfuse 1 unit PRBC      Esophageal cancer, st IV  Has lung mets.   Recently completed chemo   oncology consulted      ESRD on HD  - HD TTS  - Renally dose medications  - Monitor for electrolyte abnormalities  - Nephrology consulted    History of thrombosis of IJ:    Active Hospital Problems    Diagnosis gallops. Abdomen: Soft, NT, ND, without rebound or guarding. Normal bowel sounds. Extremities: + 2  BLE edema no clubbing, cyanosis, or edema bilaterally. Full range of motion without deformity. +2 palpable pulses, equal bilaterally. Capillary refill brisk,< 3 seconds   Skin: No rashes or lesions. Warm/dry. Neurologic:  Neurovascularly intact without any focal sensory/motor deficits. Cranial nerves: II-XII intact, grossly non-focal. Alert and oriented x 3. Normal speech. Psychiatric:  Thought content appropriate, normal insight. Labs:     Labs:   Recent Labs     11/20/23  0624 11/20/23  1805 11/21/23  0517 11/22/23  0605   WBC 8.5  --  9.3 8.1   HGB 7.2* 8.9* 8.4* 10.1*   HCT 20.8*  21.2* 25.8* 24.2* 28.8*     --  189 196       Recent Labs     11/20/23  0624 11/21/23  0517 11/22/23  0605   * 135* 131*   K 4.6 4.9 5.7*   CL 95* 96* 93*   CO2 26 22 21   BUN 64* 84* 110*   CREATININE 5.9* 6.8* 7.8*   CALCIUM 8.2* 8.2* 8.3       Recent Labs     11/20/23  0624 11/21/23  0517 11/22/23  0605   * 129* 132*   ALT 46* 46* 47*   BILITOT 12.5* 13.8* 16.0*   ALKPHOS 1,397* 1,476* 1,555*       No results for input(s): \"INR\" in the last 72 hours. No results for input(s): \"CKTOTAL\", \"TROPHS\" in the last 72 hours. Urinalysis:      Lab Results   Component Value Date/Time    NITRU Negative 07/14/2023 07:30 AM    WBCUA  07/14/2023 07:30 AM    BACTERIA 3+ 07/14/2023 07:30 AM    RBCUA 21-50 07/14/2023 07:30 AM    BLOODU LARGE 07/14/2023 07:30 AM    SPECGRAV 1.020 07/14/2023 07:30 AM    GLUCOSEU Negative 07/14/2023 07:30 AM       Radiology:  MRI ABDOMEN WO CONTRAST MRCP   Final Result   Abnormal intrahepatic biliary ductal dilatation, with relatively normal   caliber of the distal common duct. This raises the question of common duct   stricture at the level of the proximal or mid common duct.       Findings of fluid overload denoted by bilateral pleural effusions, body wall   anasarca, and

## 2023-11-22 NOTE — PLAN OF CARE
Problem: Discharge Planning  Goal: Discharge to home or other facility with appropriate resources  Outcome: Progressing     Problem: Pain  Goal: Verbalizes/displays adequate comfort level or baseline comfort level  11/22/2023 0011 by Paty Aguiar RN  Outcome: Progressing     Problem: Skin/Tissue Integrity  Goal: Absence of new skin breakdown  Description: 1. Monitor for areas of redness and/or skin breakdown  2. Assess vascular access sites hourly  3. Every 4-6 hours minimum:  Change oxygen saturation probe site  4. Every 4-6 hours:  If on nasal continuous positive airway pressure, respiratory therapy assess nares and determine need for appliance change or resting period.   11/22/2023 0011 by Paty Aguiar RN  Outcome: Progressing     Problem: Safety - Adult  Goal: Free from fall injury  11/22/2023 0011 by Paty Aguiar RN  Outcome: Progressing     Problem: ABCDS Injury Assessment  Goal: Absence of physical injury  Outcome: Progressing     Problem: Chronic Conditions and Co-morbidities  Goal: Patient's chronic conditions and co-morbidity symptoms are monitored and maintained or improved  11/22/2023 0011 by Paty Aguiar RN  Outcome: Progressing     Problem: Pain  Goal: Verbalizes/displays adequate comfort level or baseline comfort level  11/22/2023 0011 by Paty Aguiar RN  Outcome: Progressing  11/21/2023 1052 by Berenice Mike RN  Outcome: Not Progressing

## 2023-11-22 NOTE — PROGRESS NOTES
[facility] Note    Patient Active Problem List   Diagnosis    Malignant neoplasm of lower third of esophagus (HCC)    CINV (chemotherapy-induced nausea and vomiting)    Hematemesis    Renal mass    CKD (chronic kidney disease) stage 3, GFR 30-59 ml/min (HCC)    Hypertension    Acute kidney injury superimposed on CKD (HCC)    Hydropneumothorax    Anemia of chronic renal failure    Clear cell carcinoma of kidney (HCC)    Malignant neoplasm of kidney (HCC)    Monoclonal gammopathy of unknown significance (MGUS)    ROCAEL (acute kidney injury) ([facility])    Pneumothorax, right    CKD (chronic kidney disease) stage 4, GFR 15-29 ml/min (HCC)    Pneumothorax after biopsy    Hematuria    Symptomatic anemia    Anemia    Abdominal discomfort    Abnormal CT scan    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)    Abnormal weight loss    Allergy, unspecified, initial encounter    Anaphylactic shock, unspecified, initial encounter    Back pain    Low back pain    Benign neoplasm of colon    Benign prostatic hyperplasia    Benign prostatic hyperplasia without urinary obstruction    Carpal tunnel syndrome    Chest mass    Chronic diarrhea of unknown origin    Chronic viral hepatitis C (HCC)    Coagulation defect, unspecified ([facility])    Costochondritis    Dehydration    Delayed gastric emptying    Dependence on renal dialysis ([facility])    Diarrhea    Disorder of kidney and ureter    Dysphagia    Entrapment of left ulnar nerve at elbow    Esophageal adenocarcinoma (HCC)    External hemorrhoids    Fatigue due to treatment    Gastroesophageal reflux disease    Sliding hiatal hernia    Mixed hyperlipidemia    Hyperlipidemia    Impotence of organic origin    Iron deficiency anemia    Iron deficiency anemia, unspecified    Liver lesion    Loss of appetite    Malignant neoplasm of unspecified part of right bronchus or lung (HCC)    Nausea    Noninfective gastroenteritis and colitis, unspecified    Other idiopathic

## 2023-11-23 LAB
A1AT PHENOTYP SERPL-IMP: NORMAL
A1AT SERPL-MCNC: 192 MG/DL (ref 90–200)
ALBUMIN SERPL-MCNC: 2.3 G/DL (ref 3.4–5)
ALBUMIN/GLOB SERPL: 0.6 {RATIO} (ref 1.1–2.2)
ALP SERPL-CCNC: 1445 U/L (ref 40–129)
ALT SERPL-CCNC: 44 U/L (ref 10–40)
ANION GAP SERPL CALCULATED.3IONS-SCNC: 13 MMOL/L (ref 3–16)
AST SERPL-CCNC: 131 U/L (ref 15–37)
BILIRUB SERPL-MCNC: 14.1 MG/DL (ref 0–1)
BUN SERPL-MCNC: 73 MG/DL (ref 7–20)
CALCIUM SERPL-MCNC: 8.1 MG/DL (ref 8.3–10.6)
CERULOPLASMIN SERPL-MCNC: 24 MG/DL (ref 15–30)
CHLORIDE SERPL-SCNC: 96 MMOL/L (ref 99–110)
CO2 SERPL-SCNC: 22 MMOL/L (ref 21–32)
CREAT SERPL-MCNC: 5.8 MG/DL (ref 0.8–1.3)
DEPRECATED RDW RBC AUTO: 21 % (ref 12.4–15.4)
GFR SERPLBLD CREATININE-BSD FMLA CKD-EPI: 10 ML/MIN/{1.73_M2}
GLUCOSE BLD-MCNC: 115 MG/DL (ref 70–99)
GLUCOSE BLD-MCNC: 63 MG/DL (ref 70–99)
GLUCOSE BLD-MCNC: 80 MG/DL (ref 70–99)
GLUCOSE SERPL-MCNC: 76 MG/DL (ref 70–99)
HBV DNA SERPL NAA+PROBE-ACNC: NOT DETECTED IU/ML
HBV DNA SERPL NAA+PROBE-LOG IU: NOT DETECTED LOG IU/ML
HBV DNA SERPL QL NAA+PROBE: NOT DETECTED
HCT VFR BLD AUTO: 23.7 % (ref 40.5–52.5)
HGB BLD-MCNC: 8.1 G/DL (ref 13.5–17.5)
MCH RBC QN AUTO: 30.6 PG (ref 26–34)
MCHC RBC AUTO-ENTMCNC: 34.2 G/DL (ref 31–36)
MCV RBC AUTO: 89.3 FL (ref 80–100)
PERFORMED ON: ABNORMAL
PERFORMED ON: ABNORMAL
PERFORMED ON: NORMAL
PLATELET # BLD AUTO: 224 K/UL (ref 135–450)
PMV BLD AUTO: 7.9 FL (ref 5–10.5)
POTASSIUM SERPL-SCNC: 5.5 MMOL/L (ref 3.5–5.1)
POTASSIUM SERPL-SCNC: 5.5 MMOL/L (ref 3.5–5.1)
PROT SERPL-MCNC: 5.9 G/DL (ref 6.4–8.2)
RBC # BLD AUTO: 2.65 M/UL (ref 4.2–5.9)
SODIUM SERPL-SCNC: 131 MMOL/L (ref 136–145)
WBC # BLD AUTO: 9.3 K/UL (ref 4–11)

## 2023-11-23 PROCEDURE — 6370000000 HC RX 637 (ALT 250 FOR IP): Performed by: STUDENT IN AN ORGANIZED HEALTH CARE EDUCATION/TRAINING PROGRAM

## 2023-11-23 PROCEDURE — 80053 COMPREHEN METABOLIC PANEL: CPT

## 2023-11-23 PROCEDURE — 1200000000 HC SEMI PRIVATE

## 2023-11-23 PROCEDURE — 6370000000 HC RX 637 (ALT 250 FOR IP): Performed by: INTERNAL MEDICINE

## 2023-11-23 PROCEDURE — 2580000003 HC RX 258: Performed by: INTERNAL MEDICINE

## 2023-11-23 PROCEDURE — 85027 COMPLETE CBC AUTOMATED: CPT

## 2023-11-23 PROCEDURE — 84132 ASSAY OF SERUM POTASSIUM: CPT

## 2023-11-23 RX ADMIN — SEVELAMER CARBONATE 800 MG: 800 TABLET, FILM COATED ORAL at 08:25

## 2023-11-23 RX ADMIN — TAMSULOSIN HYDROCHLORIDE 0.4 MG: 0.4 CAPSULE ORAL at 08:25

## 2023-11-23 RX ADMIN — SEVELAMER CARBONATE 800 MG: 800 TABLET, FILM COATED ORAL at 13:40

## 2023-11-23 RX ADMIN — SODIUM CHLORIDE, PRESERVATIVE FREE 10 ML: 5 INJECTION INTRAVENOUS at 08:28

## 2023-11-23 RX ADMIN — SODIUM ZIRCONIUM CYCLOSILICATE 5 G: 5 POWDER, FOR SUSPENSION ORAL at 11:04

## 2023-11-23 RX ADMIN — SEVELAMER CARBONATE 800 MG: 800 TABLET, FILM COATED ORAL at 17:35

## 2023-11-23 RX ADMIN — FAMOTIDINE 10 MG: 20 TABLET ORAL at 08:25

## 2023-11-23 RX ADMIN — AMLODIPINE BESYLATE 5 MG: 5 TABLET ORAL at 08:25

## 2023-11-23 RX ADMIN — COLCHICINE 0.3 MG: 0.6 TABLET, FILM COATED ORAL at 08:25

## 2023-11-23 RX ADMIN — ASPIRIN 81 MG 81 MG: 81 TABLET ORAL at 08:25

## 2023-11-23 RX ADMIN — SODIUM CHLORIDE, PRESERVATIVE FREE 10 ML: 5 INJECTION INTRAVENOUS at 21:08

## 2023-11-23 ASSESSMENT — ENCOUNTER SYMPTOMS
ABDOMINAL PAIN: 0
ABDOMINAL DISTENTION: 0
EYE REDNESS: 0
EYE DISCHARGE: 0
CONSTIPATION: 0
PHOTOPHOBIA: 0
COUGH: 0
FACIAL SWELLING: 0
BLOOD IN STOOL: 0
DIARRHEA: 0
SHORTNESS OF BREATH: 0
NAUSEA: 0
CHEST TIGHTNESS: 0
VOMITING: 0

## 2023-11-23 ASSESSMENT — PAIN SCALES - GENERAL
PAINLEVEL_OUTOF10: 0

## 2023-11-23 NOTE — PROGRESS NOTES
Assessment  Abnormal liver tests in a cholestatic pattern, bilirubin of around 13 and alk phos of around 1600, AST and ALT in the low 100 range. CT scan has a slightly nodular contour to the liver but no obvious masses on contrast-enhanced exam.  End-stage renal disease limits MRI contrast but Noncon MRCP does suggest some intra and extrahepatic ductal dilation with some mid CBD compression or stricture. ERCP yesterday suggested hilar stricture, with stent in the left hepatic duct as the right could not be cannulated. EUS was nondiagnostic. My primary concern is for metastatic esophageal cancer. Hepatitis B surface antigen positive is probably a false positive from recent hepatitis B vaccine. Hepatitis C antibody is negative. Viral load is pending. T12 compression fracture. This might partially contribute to alk phos elevation. metastatic esophageal cancer on chemo drug holiday for about 2 months. Plan:  Low fat soft diet. Advance as tolerated. Follow-up brushings from ERCP. Trend bilirubin. Would continue Levaquin for 7 days or until the bilirubin starts to decline. If no improvement in bilirubin by next week, consider repeat ERCP to cannulate the right hepatic duct. Subjective: We are following for cholestatic labs and jaundice which seems to be on the basis of hilar stricture in a patient with metastatic esophageal cancer on chemo therapy holiday. He had ERCP yesterday as above. Eloina Salcido today is 14, numerically lower than yesterday at 16 but not yet a trend. AST/ALT stable at 131/44. Alk phos stable at 1400. Today, he feels better, with less dysphagia. No abd pain, fevers, nausea. Wants diet advanced. Objective:    Review of Systems:    Constitutional: Negative for fever, chills, and unexpected weight change. HENT: Negative for trouble swallowing. Respiratory: Negative for cough, chest tightness and shortness of breath.     Cardiovascular: Negative for chest

## 2023-11-23 NOTE — PROGRESS NOTES
[facility] Note    Patient Active Problem List   Diagnosis    Malignant neoplasm of lower third of esophagus (HCC)    CINV (chemotherapy-induced nausea and vomiting)    Hematemesis    Renal mass    CKD (chronic kidney disease) stage 3, GFR 30-59 ml/min (HCC)    Hypertension    Acute kidney injury superimposed on CKD (HCC)    Hydropneumothorax    Anemia of chronic renal failure    Clear cell carcinoma of kidney (HCC)    Malignant neoplasm of kidney (HCC)    Monoclonal gammopathy of unknown significance (MGUS)    ROCAEL (acute kidney injury) ([address])    Pneumothorax, right    CKD (chronic kidney disease) stage 4, GFR 15-29 ml/min (HCC)    Pneumothorax after biopsy    Hematuria    Symptomatic anemia    Anemia    Abdominal discomfort    Abnormal CT scan    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)    Abnormal weight loss    Allergy, unspecified, initial encounter    Anaphylactic shock, unspecified, initial encounter    Back pain    Low back pain    Benign neoplasm of colon    Benign prostatic hyperplasia    Benign prostatic hyperplasia without urinary obstruction    Carpal tunnel syndrome    Chest mass    Chronic diarrhea of unknown origin    Chronic viral hepatitis C (HCC)    Coagulation defect, unspecified ([address])    Costochondritis    Dehydration    Delayed gastric emptying    Dependence on renal dialysis ([address])    Diarrhea    Disorder of kidney and ureter    Dysphagia    Entrapment of left ulnar nerve at elbow    Esophageal adenocarcinoma (HCC)    External hemorrhoids    Fatigue due to treatment    Gastroesophageal reflux disease    Sliding hiatal hernia    Mixed hyperlipidemia    Hyperlipidemia    Impotence of organic origin    Iron deficiency anemia    Iron deficiency anemia, unspecified    Liver lesion    Loss of appetite    Malignant neoplasm of unspecified part of right bronchus or lung (HCC)    Nausea    Noninfective gastroenteritis and colitis, unspecified    Other idiopathic

## 2023-11-24 LAB
ALBUMIN SERPL-MCNC: 2.7 G/DL (ref 3.4–5)
ALBUMIN/GLOB SERPL: 0.8 {RATIO} (ref 1.1–2.2)
ALP SERPL-CCNC: 1419 U/L (ref 40–129)
ALT SERPL-CCNC: 43 U/L (ref 10–40)
ANION GAP SERPL CALCULATED.3IONS-SCNC: 17 MMOL/L (ref 3–16)
AST SERPL-CCNC: 112 U/L (ref 15–37)
BILIRUB SERPL-MCNC: 12.4 MG/DL (ref 0–1)
BUN SERPL-MCNC: 92 MG/DL (ref 7–20)
CALCIUM SERPL-MCNC: 8 MG/DL (ref 8.3–10.6)
CHLORIDE SERPL-SCNC: 97 MMOL/L (ref 99–110)
CO2 SERPL-SCNC: 20 MMOL/L (ref 21–32)
CREAT SERPL-MCNC: 6.8 MG/DL (ref 0.8–1.3)
DEPRECATED RDW RBC AUTO: 22.1 % (ref 12.4–15.4)
GFR SERPLBLD CREATININE-BSD FMLA CKD-EPI: 8 ML/MIN/{1.73_M2}
GLUCOSE BLD-MCNC: 71 MG/DL (ref 70–99)
GLUCOSE BLD-MCNC: 80 MG/DL (ref 70–99)
GLUCOSE BLD-MCNC: 86 MG/DL (ref 70–99)
GLUCOSE SERPL-MCNC: 91 MG/DL (ref 70–99)
HCT VFR BLD AUTO: 22.4 % (ref 40.5–52.5)
HGB BLD-MCNC: 8 G/DL (ref 13.5–17.5)
MCH RBC QN AUTO: 31.8 PG (ref 26–34)
MCHC RBC AUTO-ENTMCNC: 35.6 G/DL (ref 31–36)
MCV RBC AUTO: 89.3 FL (ref 80–100)
PERFORMED ON: NORMAL
PLATELET # BLD AUTO: 254 K/UL (ref 135–450)
PMV BLD AUTO: 8.1 FL (ref 5–10.5)
POTASSIUM SERPL-SCNC: 5.3 MMOL/L (ref 3.5–5.1)
PROT SERPL-MCNC: 6.3 G/DL (ref 6.4–8.2)
RBC # BLD AUTO: 2.51 M/UL (ref 4.2–5.9)
SODIUM SERPL-SCNC: 134 MMOL/L (ref 136–145)
WBC # BLD AUTO: 10.2 K/UL (ref 4–11)

## 2023-11-24 PROCEDURE — 85027 COMPLETE CBC AUTOMATED: CPT

## 2023-11-24 PROCEDURE — 2580000003 HC RX 258: Performed by: INTERNAL MEDICINE

## 2023-11-24 PROCEDURE — 6360000002 HC RX W HCPCS: Performed by: INTERNAL MEDICINE

## 2023-11-24 PROCEDURE — 6370000000 HC RX 637 (ALT 250 FOR IP): Performed by: INTERNAL MEDICINE

## 2023-11-24 PROCEDURE — 1200000000 HC SEMI PRIVATE

## 2023-11-24 PROCEDURE — 90935 HEMODIALYSIS ONE EVALUATION: CPT

## 2023-11-24 PROCEDURE — 87340 HEPATITIS B SURFACE AG IA: CPT

## 2023-11-24 PROCEDURE — 80053 COMPREHEN METABOLIC PANEL: CPT

## 2023-11-24 RX ORDER — HEPARIN SODIUM 1000 [USP'U]/ML
5200 INJECTION, SOLUTION INTRAVENOUS; SUBCUTANEOUS PRN
Status: DISCONTINUED | OUTPATIENT
Start: 2023-11-24 | End: 2023-11-28 | Stop reason: HOSPADM

## 2023-11-24 RX ADMIN — SEVELAMER CARBONATE 800 MG: 800 TABLET, FILM COATED ORAL at 12:51

## 2023-11-24 RX ADMIN — LEVOFLOXACIN 500 MG: 5 INJECTION, SOLUTION INTRAVENOUS at 18:58

## 2023-11-24 RX ADMIN — AMLODIPINE BESYLATE 5 MG: 5 TABLET ORAL at 12:52

## 2023-11-24 RX ADMIN — SEVELAMER CARBONATE 800 MG: 800 TABLET, FILM COATED ORAL at 18:00

## 2023-11-24 RX ADMIN — SEVELAMER CARBONATE 800 MG: 800 TABLET, FILM COATED ORAL at 07:44

## 2023-11-24 RX ADMIN — SODIUM CHLORIDE, PRESERVATIVE FREE 10 ML: 5 INJECTION INTRAVENOUS at 12:55

## 2023-11-24 RX ADMIN — FAMOTIDINE 10 MG: 20 TABLET ORAL at 12:51

## 2023-11-24 RX ADMIN — COLCHICINE 0.3 MG: 0.6 TABLET, FILM COATED ORAL at 12:52

## 2023-11-24 RX ADMIN — EPOETIN ALFA-EPBX 10000 UNITS: 10000 INJECTION, SOLUTION INTRAVENOUS; SUBCUTANEOUS at 11:21

## 2023-11-24 RX ADMIN — TAMSULOSIN HYDROCHLORIDE 0.4 MG: 0.4 CAPSULE ORAL at 12:51

## 2023-11-24 RX ADMIN — SODIUM CHLORIDE, PRESERVATIVE FREE 10 ML: 5 INJECTION INTRAVENOUS at 21:30

## 2023-11-24 RX ADMIN — ASPIRIN 81 MG 81 MG: 81 TABLET ORAL at 12:52

## 2023-11-24 ASSESSMENT — ENCOUNTER SYMPTOMS
DIARRHEA: 0
EYE DISCHARGE: 0
SHORTNESS OF BREATH: 0
FACIAL SWELLING: 0
ABDOMINAL PAIN: 0
COUGH: 0
BLOOD IN STOOL: 0
EYE REDNESS: 0
CHEST TIGHTNESS: 0
PHOTOPHOBIA: 0
NAUSEA: 0
VOMITING: 0
CONSTIPATION: 0
ABDOMINAL DISTENTION: 0

## 2023-11-24 ASSESSMENT — PAIN SCALES - GENERAL
PAINLEVEL_OUTOF10: 0

## 2023-11-24 NOTE — DIALYSIS
Treatment time: 3 hours  Net UF: 1000 ml     Pre weight: 53 kg  Post weight:52.2 kg  EDW: 52 kg    Access used: r femoral    Access function: good with  ml/min     Medications or blood products given: retacrit [id number]     Regular outpatient schedule: Home HD MON, TUES, THURS, FRI     Summary of response to treatment: Patient tolerated treatment well and without any complications. Patient remained stable throughout entire treatment and upon the patient exiting the dialysis suite via transport. Report given to Bogdan Regalado RN and copy of dialysis treatment record placed in chart, to be scanned into EMR.

## 2023-11-24 NOTE — PROGRESS NOTES
Nutrition Note    RECOMMENDATIONS  PO Diet: Dysphagia soft & bite sized, Renal  ONS: Nepro BID     NUTRITION ASSESSMENT   Nutrition intervention triggered d/t LOS. Pt is off unit in HD. Receives a Renal diet with dysphagia soft & bite sized texture, likely d/t hx esophageal cancer. Per EMR, pt has lost 8 lb over past 6 months, which could be related to cancer vs fluid losses with dialysis tx. Per RN, pt is eating well, with 100% bkf consumed this am.  Will offer ONS to promote adequate intake & follow for further needs as identified. Nutrition Related Findings: ERCP 11/23; no chemo since 8/23. LBM 11/22; trace edema BLE  Wounds: None  Nutrition Education:  Education not indicated   Nutrition Goals: PO intake 50% or greater     MALNUTRITION ASSESSMENT   Chronic Illness  Malnutrition Status: Insufficient data    NUTRITION DIAGNOSIS   Increased nutrient needs related to catabolic illness as evidenced by BMI, weight loss      CURRENT NUTRITION THERAPIES  ADULT DIET; Dysphagia - Soft and Bite Sized; Low Potassium (Less than 3000 mg/day); Low Fat (less than or equal to 50 gm/day)     PO Intake: %   PO Supplement Intake:None Ordered    ANTHROPOMETRICS  Current Height: 180.3 cm (5' 11\")  Current Weight - Scale: 53 kg (116 lb 13.5 oz)    Ideal Body Weight (IBW): 172 lbs  (78 kg)    Usual Bodyweight 56.2 kg (124 lb) (6 months ago)       BMI: 16.2      COMPARATIVE STANDARDS  Total Energy Requirements (kcals/day): 1590- 1855     Protein (g):  80- 106g       Fluid (mL/day):  1 ml/kcal    The patient will be monitored per nutrition standards of care. Consult dietitian if additional nutrition interventions are needed prior to RD reassessment.      Timothy Medrano, RD, LD    Contact: [phone]

## 2023-11-24 NOTE — PROGRESS NOTES
[facility] Note    Patient Active Problem List   Diagnosis    Malignant neoplasm of lower third of esophagus (HCC)    CINV (chemotherapy-induced nausea and vomiting)    Hematemesis    Renal mass    CKD (chronic kidney disease) stage 3, GFR 30-59 ml/min (HCC)    Hypertension    Acute kidney injury superimposed on CKD (HCC)    Hydropneumothorax    Anemia of chronic renal failure    Clear cell carcinoma of kidney (HCC)    Malignant neoplasm of kidney (HCC)    Monoclonal gammopathy of unknown significance (MGUS)    ROCAEL (acute kidney injury) ([address])    Pneumothorax, right    CKD (chronic kidney disease) stage 4, GFR 15-29 ml/min (HCC)    Pneumothorax after biopsy    Hematuria    Symptomatic anemia    Anemia    Abdominal discomfort    Abnormal CT scan    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)    Abnormal weight loss    Allergy, unspecified, initial encounter    Anaphylactic shock, unspecified, initial encounter    Back pain    Low back pain    Benign neoplasm of colon    Benign prostatic hyperplasia    Benign prostatic hyperplasia without urinary obstruction    Carpal tunnel syndrome    Chest mass    Chronic diarrhea of unknown origin    Chronic viral hepatitis C (HCC)    Coagulation defect, unspecified ([address])    Costochondritis    Dehydration    Delayed gastric emptying    Dependence on renal dialysis ([address])    Diarrhea    Disorder of kidney and ureter    Dysphagia    Entrapment of left ulnar nerve at elbow    Esophageal adenocarcinoma (HCC)    External hemorrhoids    Fatigue due to treatment    Gastroesophageal reflux disease    Sliding hiatal hernia    Mixed hyperlipidemia    Hyperlipidemia    Impotence of organic origin    Iron deficiency anemia    Iron deficiency anemia, unspecified    Liver lesion    Loss of appetite    Malignant neoplasm of unspecified part of right bronchus or lung (HCC)    Nausea    Noninfective gastroenteritis and colitis, unspecified    Other idiopathic chemotherapy  9. Hyperkalemia- HD today. Low K diet. 10.  Low serum HCO3-HD today and tomorrow  10.   Disposition-per GI and Medicine    Steffanie Marquez MD

## 2023-11-24 NOTE — PROGRESS NOTES
Date of Admission: 11/17/2023. Hospital Day: 8       Assessment/Plan:   58 y.o. male who presented to ED for evaluation of chest pain, sob. Found to have acute pericarditis, improved with colchicine. Also had significant elevation in unconjugated troponin and alk phos. no evidence of liver mets. MRCP to be done today. Also  Has been progressively anemic without evidence of bleeding,ordered hemolytic anemia workup although low suspicion. Expected dc 11/21 if blood count and liver enzymes remain stable      Acute pericarditis : iimproving  - EKG mild but generalized ST elevation consistent with acute pericarditis. ? Viral , pt reported  cough, weakness  recently    Repeat EKG 1/19  show similar st elevations  - Troponin 147 > 124, chronically elevated in the setting of renal insufficiency, stable compared to prior troponin. No h/o CAD  -cont colcichine       Hyperbilirubinemia  Severely elevated bilirubin, total 13, direct 3 on admiison,  ALP 1500 . Staying relatively flat  , AST and ALT also elevated on admisision but trending down  Obtain hepatitis panel . Hbs Ag +, ab -ve . Could be false + from recent vaccination, d/w  GI dr Trang Centeno, will obtain Hep B DNA levels  Ct w contrast abd -ve for liver mets  MRCP with intrahepatic biliary dilation and normal distal CBD concerning for CBD stricture at mid or proximal CBD. ERCP on 11/22: Hilar stricture s/p biliary sphincterotomy, prophylactic pancreatic stent placement, small bile duct stone extraction, biliary brushings, and placement of a 7Fr 12cm biliary stent into the dilated left hepatic duct. Levaquin  for 7 days or until the bilirubin starts to decline   Monitor bilirubin. If no improvement in bilirubin by next week, repeat ERCP to cannulate the right hepatic duct per GI. Bilirubin 12.4 this morning. Anemia, acute on chronic/ thromocytopenia  - Stable  11/20 transfuse 1 unit PRBC      Esophageal cancer, st IV  Has lung mets.   Recently completed

## 2023-11-24 NOTE — PROGRESS NOTES
Physical/Occupational Therapy  Brittanie Sales    Attempted to see pt for PT/OT evaluation. Patient currently off the floor to HD. Will hold therapy at this time and will follow up with pt as schedule permits.  Thanks, Iqra Huston, PT, DPT [id number], [facility] OTR/L [address] [place] [address]

## 2023-11-25 LAB
ALBUMIN SERPL-MCNC: 2.5 G/DL (ref 3.4–5)
ALBUMIN/GLOB SERPL: 0.8 {RATIO} (ref 1.1–2.2)
ALP SERPL-CCNC: 1063 U/L (ref 40–129)
ALT SERPL-CCNC: 35 U/L (ref 10–40)
ANION GAP SERPL CALCULATED.3IONS-SCNC: 11 MMOL/L (ref 3–16)
AST SERPL-CCNC: 86 U/L (ref 15–37)
BILIRUB SERPL-MCNC: 9.3 MG/DL (ref 0–1)
BUN SERPL-MCNC: 63 MG/DL (ref 7–20)
CALCIUM SERPL-MCNC: 7.9 MG/DL (ref 8.3–10.6)
CHLORIDE SERPL-SCNC: 99 MMOL/L (ref 99–110)
CO2 SERPL-SCNC: 25 MMOL/L (ref 21–32)
CREAT SERPL-MCNC: 5 MG/DL (ref 0.8–1.3)
DEPRECATED RDW RBC AUTO: 22.4 % (ref 12.4–15.4)
GFR SERPLBLD CREATININE-BSD FMLA CKD-EPI: 12 ML/MIN/{1.73_M2}
GLUCOSE BLD-MCNC: 126 MG/DL (ref 70–99)
GLUCOSE BLD-MCNC: 78 MG/DL (ref 70–99)
GLUCOSE BLD-MCNC: 79 MG/DL (ref 70–99)
GLUCOSE SERPL-MCNC: 83 MG/DL (ref 70–99)
HCT VFR BLD AUTO: 20.3 % (ref 40.5–52.5)
HGB BLD-MCNC: 7 G/DL (ref 13.5–17.5)
MCH RBC QN AUTO: 30.9 PG (ref 26–34)
MCHC RBC AUTO-ENTMCNC: 34.6 G/DL (ref 31–36)
MCV RBC AUTO: 89.1 FL (ref 80–100)
PERFORMED ON: ABNORMAL
PERFORMED ON: NORMAL
PERFORMED ON: NORMAL
PLATELET # BLD AUTO: 229 K/UL (ref 135–450)
PMV BLD AUTO: 8.1 FL (ref 5–10.5)
POTASSIUM SERPL-SCNC: 4.9 MMOL/L (ref 3.5–5.1)
PROT SERPL-MCNC: 5.7 G/DL (ref 6.4–8.2)
RBC # BLD AUTO: 2.28 M/UL (ref 4.2–5.9)
SODIUM SERPL-SCNC: 135 MMOL/L (ref 136–145)
WBC # BLD AUTO: 9.1 K/UL (ref 4–11)

## 2023-11-25 PROCEDURE — 85027 COMPLETE CBC AUTOMATED: CPT

## 2023-11-25 PROCEDURE — 80053 COMPREHEN METABOLIC PANEL: CPT

## 2023-11-25 PROCEDURE — 97116 GAIT TRAINING THERAPY: CPT

## 2023-11-25 PROCEDURE — 90935 HEMODIALYSIS ONE EVALUATION: CPT

## 2023-11-25 PROCEDURE — 6370000000 HC RX 637 (ALT 250 FOR IP): Performed by: INTERNAL MEDICINE

## 2023-11-25 PROCEDURE — 1200000000 HC SEMI PRIVATE

## 2023-11-25 PROCEDURE — 97530 THERAPEUTIC ACTIVITIES: CPT

## 2023-11-25 PROCEDURE — 6360000002 HC RX W HCPCS: Performed by: INTERNAL MEDICINE

## 2023-11-25 PROCEDURE — 97162 PT EVAL MOD COMPLEX 30 MIN: CPT

## 2023-11-25 PROCEDURE — 97535 SELF CARE MNGMENT TRAINING: CPT

## 2023-11-25 PROCEDURE — 2580000003 HC RX 258: Performed by: INTERNAL MEDICINE

## 2023-11-25 PROCEDURE — 97166 OT EVAL MOD COMPLEX 45 MIN: CPT

## 2023-11-25 RX ADMIN — TAMSULOSIN HYDROCHLORIDE 0.4 MG: 0.4 CAPSULE ORAL at 08:26

## 2023-11-25 RX ADMIN — SODIUM CHLORIDE, PRESERVATIVE FREE 10 ML: 5 INJECTION INTRAVENOUS at 08:25

## 2023-11-25 RX ADMIN — AMLODIPINE BESYLATE 5 MG: 5 TABLET ORAL at 08:26

## 2023-11-25 RX ADMIN — SEVELAMER CARBONATE 800 MG: 800 TABLET, FILM COATED ORAL at 12:13

## 2023-11-25 RX ADMIN — COLCHICINE 0.3 MG: 0.6 TABLET, FILM COATED ORAL at 08:26

## 2023-11-25 RX ADMIN — SEVELAMER CARBONATE 800 MG: 800 TABLET, FILM COATED ORAL at 08:26

## 2023-11-25 RX ADMIN — FAMOTIDINE 10 MG: 20 TABLET ORAL at 08:26

## 2023-11-25 RX ADMIN — ASPIRIN 81 MG 81 MG: 81 TABLET ORAL at 08:26

## 2023-11-25 RX ADMIN — EPOETIN ALFA-EPBX 10000 UNITS: 10000 INJECTION, SOLUTION INTRAVENOUS; SUBCUTANEOUS at 17:00

## 2023-11-25 RX ADMIN — SEVELAMER CARBONATE 800 MG: 800 TABLET, FILM COATED ORAL at 18:35

## 2023-11-25 ASSESSMENT — ENCOUNTER SYMPTOMS
FACIAL SWELLING: 0
CHEST TIGHTNESS: 0
ABDOMINAL DISTENTION: 0
ABDOMINAL PAIN: 0
COUGH: 0
VOMITING: 0
SHORTNESS OF BREATH: 0
DIARRHEA: 0
BLOOD IN STOOL: 0
EYE DISCHARGE: 0
PHOTOPHOBIA: 0
NAUSEA: 0
EYE REDNESS: 0
CONSTIPATION: 0

## 2023-11-25 ASSESSMENT — PAIN SCALES - GENERAL
PAINLEVEL_OUTOF10: 0

## 2023-11-25 NOTE — PROGRESS NOTES
without rails , 8 steps to upper level, 8 steps to lower level  Bathroom Layout: tub/shower unit  Toilet Height: standard height  Home Equipment: no prior equipment  Transfer Assistance: Independent without use of device  Ambulation Assistance:Independent without use of device  ADL Assistance: independent with all ADL's  IADL Assistance: independent with homemaking tasks  Active :        [x] Yes                 [] No  Hand Dominance: [] Left                 [x] Right  Current Employment: retired. Occupation: [employer]  Hobbies: fish tanks, garden  Recent Falls: no falls      Examination     Vision:   Vision Gross Assessment: Impaired and Vision Corrective Device: wears glasses at all times  Hearing:   [employer]  Perception:   WFL  Observation:   General Observation:  RA with SpO2 at 96% at rest  Edema: Edema located in (B) LE. Edema Level: 3+: moderate with skin rebound in 15-30 seconds  Posture: Forward head, rounded shoulders, tightness in upper traps  Sensation:   WFL  Proprioception:    WFL  Tone:   Normotonic  Coordination Testing:   WFL    ROM:   (B) UE AROM WFL  Strength:   (B) UE strength grossly WFL    Decision Making: medium complexity  Clinical Presentation: evolving      Subjective    General: Pt reclined in bed upon arrival; agreeable to eval with education. Reports he has been more fatigued and is using the RW at the hospital (no device at home). Pain: 0/10  Pain Interventions: not applicable           Activities of Daily Living    Basic Activities of Daily Living  Grooming: stand by assistance  Grooming Comments: oral care and face washing seated on BSC in front of sink  Lower Extremity Dressing: minimal assistance  Dressing Comments: donning slide-on slippers; removed due to poor fit secondary to edema  Instrumental Activities of Daily Living  No IADL completed on this date.     Functional Mobility    Bed Mobility  Supine to Sit: stand by assistance  Sit to Supine: stand by assistance  Scooting: stand by assistance  Comments: HOB elevated  Transfers  Sit to stand transfer:stand by assistance  Stand to sit transfer: stand by assistance  Stand step transfer: stand by assistance  Comments: use of RW; EOB>ambulating>to/from 4ww>EOB  Functional Mobility:  Functional Mobility Activity: to/from bathroom, 60' x2  Device Use: rollator (7WIL)  Required Assistance: stand by assistance  Comment: seated rest break between ambulation trials due to fatigue; desat to 92% with recovery to 97% within 2 minutes; education on energy conservation and PLB    Other Therapeutic Interventions      Functional Outcomes  AM-PAC Inpatient Daily Activity Raw Score: 19      Cognition  WFL  Orientation:    alert and oriented x 4  Command Following:   [facility]     Education    Barriers To Learning: none  Patient Education: patient educated on goals, OT role and benefits, plan of care, energy conservation, transfer training, discharge recommendations  Learning Assessment:  patient verbalizes and demonstrates understanding    Assessment    Activity Tolerance: decreased endurance  Impairments Requiring Therapeutic Intervention: decreased functional mobility, decreased ADL status, decreased endurance, decreased balance, decreased IADL  Prognosis: good  Clinical Assessment: Pt presents below baseline level of function (independent) and is limited in the above areas impacting independence in functional mobility and ADLs. Pt currently requires SBA for mobility with intermittent rest breaks due to limited endurance impacted by cardiorespiratory function. May benefit from consult with respiratory therapy as pt desat during session as noted above. Recommend equipment above for safety and energy conservation. Pt would benefit from skilled acute OT services to address these deficits.     Safety Interventions: patient left in bed, call light within reach, gait belt, and nurse notified       Plan    Frequency: 3-5 x/per week  Current

## 2023-11-25 NOTE — PROGRESS NOTES
chemotherapy  9. Hyperkalemia- HD today. Low K diet. Better. 10.  Low serum HCO3-better.    10.  Disposition-per GI and Medicine    Evy Obrien MD

## 2023-11-25 NOTE — PROGRESS NOTES
Assessment  Abnormal liver tests in a cholestatic pattern, bilirubin of around 13 and alk phos of around 1600, AST and ALT in the low 100 range. CT scan has a slightly nodular contour to the liver but no obvious masses on contrast-enhanced exam.  ESRD disease limits MRI contrast but Noncon MRCP does suggest some intra and extrahepatic ductal dilation with some mid CBD compression or stricture. ERCP 11/22 suggested hilar stricture, with stent in the left hepatic duct as the right could not be cannulated. EUS was nondiagnostic. My primary concern is for metastatic esophageal cancer. Jaundice. Bili down to 9.3, which seems to be a trend of improvement. Hepatitis B surface antigen positive is probably a false positive from recent hepatitis B vaccine. Hepatitis C antibody is negative. Viral load is pending. T12 compression fracture. This might partially contribute to alk phos elevation. metastatic esophageal cancer on chemo drug holiday for about 2 months. Chronic normocytic anemia, partially from renal disease/ACD/Phlebotomy . Ferritin >6000     Plan:  Continue reg diet started 11/24    Follow-up brushings from ERCP. Trend bilirubin. Would continue Levaquin until Monday. Possible discharge Monday? He can follow up with Dr Lety Somers in 2 weeks and recheck LFT. repeat ERCP to cannulate the right hepatic duct if bilirubin doesn't normalize  Check b12, folate     Subjective: We are following for cholestatic labs and jaundice which seems to be on the basis of hilar stricture in a patient with metastatic esophageal cancer on chemo therapy holiday. He had ERCP 11/22. Luis Milch today down to 9.3, AP down to 1000. no fevers. Hgb 7. Mild nausea, but this resolved promptly. Appetite is excellent and he likes the regular diet. No abd pain. Objective:    Review of Systems:    Constitutional: Negative for fever, chills, and unexpected weight change.    HENT: Negative for trouble

## 2023-11-25 NOTE — PROGRESS NOTES
Treatment time: 3 hours  Net UF: 1100 ml     Pre weight: 52.2 kg  Post weight:51.1 kg  EDW: 54 kg       Access used: Rt Femoral    Access function: well with  ml/min     Medications or blood products given: Retacrit 08363 units     Regular outpatient schedule: TTS     Summary of response to treatment: Patient tolerated treatment well and without any complications. Patient remained stable throughout entire treatment.

## 2023-11-25 NOTE — PROGRESS NOTES
Date of Admission: 11/17/2023. Hospital Day: 9       Assessment/Plan:   58 y.o. male who presented to ED for evaluation of chest pain, sob. Found to have acute pericarditis, improved with colchicine. Also had significant elevation in unconjugated troponin and alk phos. no evidence of liver mets. MRCP to be done today. Also  Has been progressively anemic without evidence of bleeding,ordered hemolytic anemia workup although low suspicion. Expected dc 11/21 if blood count and liver enzymes remain stable      Acute pericarditis: Resolved  - EKG mild but generalized ST elevation consistent with acute pericarditis. ? Viral , pt reported  cough, weakness  recently    Repeat EKG 1/19  show similar st elevations  - Troponin 147 > 124, chronically elevated in the setting of renal insufficiency, stable compared to prior troponin. No h/o CAD  -cont colcichine       Hyperbilirubinemia  Severely elevated bilirubin, total 13, direct 3 on admiison,  ALP 1500 . Staying relatively flat  , AST and ALT also elevated on admisision but trending down  Obtain hepatitis panel . Hbs Ag +, ab -ve . Could be false + from recent vaccination, d/w  GI dr Ez Eisenberg, will obtain Hep B DNA levels  Ct w contrast abd -ve for liver mets  MRCP with intrahepatic biliary dilation and normal distal CBD concerning for CBD stricture at mid or proximal CBD. ERCP on 11/22: Hilar stricture s/p biliary sphincterotomy, prophylactic pancreatic stent placement, small bile duct stone extraction, biliary brushings, and placement of a 7Fr 12cm biliary stent into the dilated left hepatic duct. Levaquin  for 7 days or until the bilirubin starts to decline   Monitor bilirubin. If no improvement in bilirubin by next week, repeat ERCP to cannulate the right hepatic duct per GI. Bilirubin 9.3 this morning. Anemia, acute on chronic/ thromocytopenia  - Stable  11/20 transfuse 1 unit PRBC      Esophageal cancer, st IV  Has lung mets.   Recently completed chemo

## 2023-11-25 NOTE — PROGRESS NOTES
dec'd tolerance to prolonged standing activity. Recommend desat study with respiratory therapy for possible home O2 prn, home health PT at d/c given current deficits. Safety Interventions: patient left in bed, call light within reach, gait belt, patient at risk for falls, and nurse notified    Plan  Frequency: 3-5 x/per week  Current Treatment Recommendations: strengthening, balance training, functional mobility training, transfer training, gait training, stair training, endurance training, patient/caregiver education, safety education, and equipment evaluation/education    Goals  Patient Goals: Return home. Short Term Goals:  Time Frame: Discharge. Patient will complete bed mobility at modified independent   Patient will complete transfers at [facility]   Patient will ambulate 100 ft with use of rollator (4WRW) at [facility]  Patient will ascend/descend 8 stairs with (R) ascending handrail at modified independent    Above goals reviewed on 11/25/2023. All goals are ongoing at this time unless indicated above.       Therapy Session Time      Individual Group Co-treatment   Time In     1405   Time Out     1121   Minutes     41     Timed Code Treatment Minutes:  26  Total Treatment Minutes:  41       Electronically Signed By: Patricia Voss PT, DPT, ATC-R [id number]

## 2023-11-26 LAB
ABO + RH BLD: NORMAL
ALBUMIN SERPL-MCNC: 2.7 G/DL (ref 3.4–5)
ALBUMIN/GLOB SERPL: 0.8 {RATIO} (ref 1.1–2.2)
ALP SERPL-CCNC: 1078 U/L (ref 40–129)
ALT SERPL-CCNC: 33 U/L (ref 10–40)
ANION GAP SERPL CALCULATED.3IONS-SCNC: 14 MMOL/L (ref 3–16)
AST SERPL-CCNC: 74 U/L (ref 15–37)
BILIRUB DIRECT SERPL-MCNC: 5.7 MG/DL (ref 0–0.3)
BILIRUB INDIRECT SERPL-MCNC: 2.6 MG/DL (ref 0–1)
BILIRUB SERPL-MCNC: 8.3 MG/DL (ref 0–1)
BLD GP AB SCN SERPL QL: NORMAL
BLOOD BANK DISPENSE STATUS: NORMAL
BLOOD BANK PRODUCT CODE: NORMAL
BPU ID: NORMAL
BUN SERPL-MCNC: 55 MG/DL (ref 7–20)
CALCIUM SERPL-MCNC: 8 MG/DL (ref 8.3–10.6)
CHLORIDE SERPL-SCNC: 95 MMOL/L (ref 99–110)
CO2 SERPL-SCNC: 24 MMOL/L (ref 21–32)
CREAT SERPL-MCNC: 4.6 MG/DL (ref 0.8–1.3)
DEPRECATED RDW RBC AUTO: 23.5 % (ref 12.4–15.4)
DESCRIPTION BLOOD BANK: NORMAL
FOLATE SERPL-MCNC: 8.8 NG/ML (ref 4.78–24.2)
GFR SERPLBLD CREATININE-BSD FMLA CKD-EPI: 13 ML/MIN/{1.73_M2}
GLUCOSE BLD-MCNC: 100 MG/DL (ref 70–99)
GLUCOSE BLD-MCNC: 116 MG/DL (ref 70–99)
GLUCOSE BLD-MCNC: 128 MG/DL (ref 70–99)
GLUCOSE BLD-MCNC: 65 MG/DL (ref 70–99)
GLUCOSE SERPL-MCNC: 90 MG/DL (ref 70–99)
HCT VFR BLD AUTO: 19.9 % (ref 40.5–52.5)
HCT VFR BLD AUTO: 27.2 % (ref 40.5–52.5)
HGB BLD-MCNC: 6.8 G/DL (ref 13.5–17.5)
HGB BLD-MCNC: 9.2 G/DL (ref 13.5–17.5)
MCH RBC QN AUTO: 31 PG (ref 26–34)
MCHC RBC AUTO-ENTMCNC: 34.4 G/DL (ref 31–36)
MCV RBC AUTO: 90.3 FL (ref 80–100)
PERFORMED ON: ABNORMAL
PLATELET # BLD AUTO: 258 K/UL (ref 135–450)
PMV BLD AUTO: 8.6 FL (ref 5–10.5)
POTASSIUM SERPL-SCNC: 4.1 MMOL/L (ref 3.5–5.1)
PROT SERPL-MCNC: 6.1 G/DL (ref 6.4–8.2)
RBC # BLD AUTO: 2.2 M/UL (ref 4.2–5.9)
SODIUM SERPL-SCNC: 133 MMOL/L (ref 136–145)
VIT B12 SERPL-MCNC: >2000 PG/ML (ref 211–911)
WBC # BLD AUTO: 8.5 K/UL (ref 4–11)

## 2023-11-26 PROCEDURE — 6360000002 HC RX W HCPCS: Performed by: INTERNAL MEDICINE

## 2023-11-26 PROCEDURE — 82746 ASSAY OF FOLIC ACID SERUM: CPT

## 2023-11-26 PROCEDURE — 85014 HEMATOCRIT: CPT

## 2023-11-26 PROCEDURE — 6370000000 HC RX 637 (ALT 250 FOR IP): Performed by: INTERNAL MEDICINE

## 2023-11-26 PROCEDURE — 86901 BLOOD TYPING SEROLOGIC RH(D): CPT

## 2023-11-26 PROCEDURE — 36430 TRANSFUSION BLD/BLD COMPNT: CPT

## 2023-11-26 PROCEDURE — 1200000000 HC SEMI PRIVATE

## 2023-11-26 PROCEDURE — 85018 HEMOGLOBIN: CPT

## 2023-11-26 PROCEDURE — 82607 VITAMIN B-12: CPT

## 2023-11-26 PROCEDURE — 86923 COMPATIBILITY TEST ELECTRIC: CPT

## 2023-11-26 PROCEDURE — 80053 COMPREHEN METABOLIC PANEL: CPT

## 2023-11-26 PROCEDURE — 85027 COMPLETE CBC AUTOMATED: CPT

## 2023-11-26 PROCEDURE — P9016 RBC LEUKOCYTES REDUCED: HCPCS

## 2023-11-26 PROCEDURE — 2580000003 HC RX 258: Performed by: INTERNAL MEDICINE

## 2023-11-26 PROCEDURE — 86850 RBC ANTIBODY SCREEN: CPT

## 2023-11-26 PROCEDURE — 86900 BLOOD TYPING SEROLOGIC ABO: CPT

## 2023-11-26 RX ORDER — SODIUM CHLORIDE 9 MG/ML
INJECTION, SOLUTION INTRAVENOUS PRN
Status: DISCONTINUED | OUTPATIENT
Start: 2023-11-26 | End: 2023-11-28 | Stop reason: HOSPADM

## 2023-11-26 RX ADMIN — COLCHICINE 0.3 MG: 0.6 TABLET, FILM COATED ORAL at 08:33

## 2023-11-26 RX ADMIN — SODIUM CHLORIDE, PRESERVATIVE FREE 10 ML: 5 INJECTION INTRAVENOUS at 08:36

## 2023-11-26 RX ADMIN — SEVELAMER CARBONATE 800 MG: 800 TABLET, FILM COATED ORAL at 12:07

## 2023-11-26 RX ADMIN — SEVELAMER CARBONATE 800 MG: 800 TABLET, FILM COATED ORAL at 18:19

## 2023-11-26 RX ADMIN — LEVOFLOXACIN 500 MG: 5 INJECTION, SOLUTION INTRAVENOUS at 18:25

## 2023-11-26 RX ADMIN — SEVELAMER CARBONATE 800 MG: 800 TABLET, FILM COATED ORAL at 08:33

## 2023-11-26 RX ADMIN — AMLODIPINE BESYLATE 5 MG: 5 TABLET ORAL at 08:32

## 2023-11-26 RX ADMIN — TAMSULOSIN HYDROCHLORIDE 0.4 MG: 0.4 CAPSULE ORAL at 08:32

## 2023-11-26 RX ADMIN — ASPIRIN 81 MG 81 MG: 81 TABLET ORAL at 08:33

## 2023-11-26 RX ADMIN — FAMOTIDINE 10 MG: 20 TABLET ORAL at 08:33

## 2023-11-26 ASSESSMENT — ENCOUNTER SYMPTOMS
CONSTIPATION: 0
CHEST TIGHTNESS: 0
ABDOMINAL PAIN: 0
BLOOD IN STOOL: 0
NAUSEA: 0
ABDOMINAL DISTENTION: 0
EYE REDNESS: 0
VOMITING: 0
EYE DISCHARGE: 0
DIARRHEA: 0
FACIAL SWELLING: 0
PHOTOPHOBIA: 0
SHORTNESS OF BREATH: 0
COUGH: 0

## 2023-11-26 ASSESSMENT — PAIN SCALES - GENERAL: PAINLEVEL_OUTOF10: 0

## 2023-11-26 NOTE — CONSENT
Informed Consent for Blood Component Transfusion Note    I have discussed with the patient the rationale for blood component transfusion; its benefits in treating or preventing fatigue, organ damage, or death; and its risk which includes mild transfusion reactions, rare risk of blood borne infection, or more serious but rare reactions. I have discussed the alternatives to transfusion, including the risk and consequences of not receiving transfusion. The patient had an opportunity to ask questions and had agreed to proceed with transfusion of blood components.     Electronically signed by Esha Nance MD on 11/26/23 at 10:04 AM EST

## 2023-11-26 NOTE — PROGRESS NOTES
Pt alert and oriented x4, VSS, left chest port is capped. Right femoral vas cath is capped. Fistula in LUE has good thrill and bruit. Pt ambulates independently with a steady gait. Bedside table and call light in reach.

## 2023-11-26 NOTE — PROGRESS NOTES
[facility] Note    Patient Active Problem List   Diagnosis    Malignant neoplasm of lower third of esophagus (HCC)    CINV (chemotherapy-induced nausea and vomiting)    Hematemesis    Renal mass    CKD (chronic kidney disease) stage 3, GFR 30-59 ml/min (HCC)    Hypertension    Acute kidney injury superimposed on CKD (HCC)    Hydropneumothorax    Anemia of chronic renal failure    Clear cell carcinoma of kidney (HCC)    Malignant neoplasm of kidney (HCC)    Monoclonal gammopathy of unknown significance (MGUS)    ROCAEL (acute kidney injury) ([address])    Pneumothorax, right    CKD (chronic kidney disease) stage 4, GFR 15-29 ml/min (HCC)    Pneumothorax after biopsy    Hematuria    Symptomatic anemia    Anemia    Abdominal discomfort    Abnormal CT scan    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)    Abnormal weight loss    Allergy, unspecified, initial encounter    Anaphylactic shock, unspecified, initial encounter    Back pain    Low back pain    Benign neoplasm of colon    Benign prostatic hyperplasia    Benign prostatic hyperplasia without urinary obstruction    Carpal tunnel syndrome    Chest mass    Chronic diarrhea of unknown origin    Chronic viral hepatitis C (HCC)    Coagulation defect, unspecified ([address])    Costochondritis    Dehydration    Delayed gastric emptying    Dependence on renal dialysis ([address])    Diarrhea    Disorder of kidney and ureter    Dysphagia    Entrapment of left ulnar nerve at elbow    Esophageal adenocarcinoma (HCC)    External hemorrhoids    Fatigue due to treatment    Gastroesophageal reflux disease    Sliding hiatal hernia    Mixed hyperlipidemia    Hyperlipidemia    Impotence of organic origin    Iron deficiency anemia    Iron deficiency anemia, unspecified    Liver lesion    Loss of appetite    Malignant neoplasm of unspecified part of right bronchus or lung (HCC)    Nausea    Noninfective gastroenteritis and colitis, unspecified    Other idiopathic

## 2023-11-26 NOTE — PROGRESS NOTES
Critical H&H received from lab, messaged MD- waiting for orders. Morning assessment and medications complete, pt cooperated and tolerated well. Medications given per MAR. VS stable. A&O X4. States no pain at this time. BS checked- 65, no Insulin given- pt drinking orange juice, will reassess. Patient clean and dry. Ambulates IND in room, tolerates well. Tele monitor on. Breakfast tray set up. Bed side table, call light and belongings within reach. Bed locked and in lowest position. All questions and concerns addressed, no further needs at this time.

## 2023-11-26 NOTE — PROGRESS NOTES
Date of Admission: 11/17/2023. Hospital Day: 10       Assessment/Plan:   58 y.o. male who presented to ED for evaluation of chest pain, sob. Found to have acute pericarditis, improved with colchicine. Also had significant elevation in unconjugated troponin and alk phos. no evidence of liver mets. MRCP to be done today. Also  Has been progressively anemic without evidence of bleeding,ordered hemolytic anemia workup although low suspicion. Expected dc 11/21 if blood count and liver enzymes remain stable      Acute pericarditis: Resolved  - EKG mild but generalized ST elevation consistent with acute pericarditis. ? Viral , pt reported  cough, weakness  recently    Repeat EKG 1/19  show similar st elevations  - Troponin 147 > 124, chronically elevated in the setting of renal insufficiency, stable compared to prior troponin. No h/o CAD  -cont colcichine       Hyperbilirubinemia  Severely elevated bilirubin, total 13, direct 3 on admiison,  ALP 1500 . Staying relatively flat  , AST and ALT also elevated on admisision but trending down  Obtain hepatitis panel . Hbs Ag +, ab -ve . Could be false + from recent vaccination, d/w  GI dr Mandeep Barreto, will obtain Hep B DNA levels  Ct w contrast abd -ve for liver mets  MRCP with intrahepatic biliary dilation and normal distal CBD concerning for CBD stricture at mid or proximal CBD. ERCP on 11/22: Hilar stricture s/p biliary sphincterotomy, prophylactic pancreatic stent placement, small bile duct stone extraction, biliary brushings, and placement of a 7Fr 12cm biliary stent into the dilated left hepatic duct. Levaquin  for 1 more day. Monitor bilirubin. If no improvement in bilirubin by next week, repeat ERCP to cannulate the right hepatic duct per GI. Bilirubin 8.3 this morning. Anemia, acute on chronic/ thromocytopenia  11/20 transfuse 1 unit PRBC  Hemoglobin this morning 6.8. No active signs of bleeding.   Per GI, no colonoscopy or EGD is indicated at this

## 2023-11-26 NOTE — PLAN OF CARE
Problem: Discharge Planning  Goal: Discharge to home or other facility with appropriate resources  Outcome: Progressing     Problem: Pain  Goal: Verbalizes/displays adequate comfort level or baseline comfort level  11/26/2023 0803 by Melissa Flores RN  Outcome: Progressing  11/26/2023 0333 by Martha Tan RN  Outcome: Progressing     Problem: Skin/Tissue Integrity  Goal: Absence of new skin breakdown  Description: 1. Monitor for areas of redness and/or skin breakdown  2. Assess vascular access sites hourly  3. Every 4-6 hours minimum:  Change oxygen saturation probe site  4. Every 4-6 hours:  If on nasal continuous positive airway pressure, respiratory therapy assess nares and determine need for appliance change or resting period.   Outcome: Progressing

## 2023-11-26 NOTE — PROGRESS NOTES
Assessment  Abnormal liver tests in a cholestatic pattern. Bilirubin peaked at 16 and ap 1400 with AST and ALT in the low 100 range. CT scan has a slightly nodular contour to the liver but no obvious masses on contrast-enhanced exam.  ESRD disease limits MRI contrast but Noncon MRCP does suggest some intra and extrahepatic ductal dilation with some mid CBD compression or stricture. ERCP 11/22 suggested hilar stricture, with stent in the left hepatic duct as the right could not be cannulated. EUS was nondiagnostic. My primary concern is for metastatic esophageal cancer causing hilar stricture. Jaundice. Bili down to yesterday 9.3, which seems to be a trend of improvement. No lab today. .  Hepatitis B surface antigen positive is probably a false positive from recent hepatitis B vaccine. Hepatitis C antibody is negative. Viral load is pending. T12 compression fracture. This might partially contribute to alk phos elevation. metastatic esophageal cancer on chemo drug holiday for about 2 months. Chronic normocytic anemia, partially from renal disease/ACD/Phlebotomy . Ferritin >6000. No overt bleeding. 1 unit PRBC ordered for drifting hgb. Plan:  Continue reg diet started 11/24    Follow-up brushings from ERCP. Trend bilirubin. LFT not available today so ordering now. Would continue Levaquin until Monday, assuming bili continues to improve. Possible discharge Monday or Tuesday if bili improving and hgb stable. He can follow up with Dr Amna Philippe in 2 weeks to consider repeat ERCP to cannulate the right hepatic duct if bilirubin doesn't normalize  followup b12, folate     Subjective: We are following for cholestatic labs and jaundice which seems to be on the basis of hilar stricture in a patient with metastatic esophageal cancer on chemo therapy holiday. He had ERCP 11/22. Yesterday, T. bili today down to 9.3 AP down to 1000 but not drawn today. no fevers.   Hgb  6.8 today and

## 2023-11-26 NOTE — PLAN OF CARE
Problem: Pain  Goal: Verbalizes/displays adequate comfort level or baseline comfort level  Outcome: Progressing   Pt denies pain at this time, RN encouraged pt to call out if needed anything. Will continue to assess pain level throughout shift. Problem: Safety - Adult  Goal: Free from fall injury  Outcome: Progressing   Pt has been free from falls this shift, bed in lowest position, 2/4 side rails up, nonskid socks on, wheels locked, bedside table and call light in reach. Encouraged pt to call out if needed anything.

## 2023-11-27 LAB
ALBUMIN SERPL-MCNC: 2.6 G/DL (ref 3.4–5)
ALBUMIN/GLOB SERPL: 0.8 {RATIO} (ref 1.1–2.2)
ALP SERPL-CCNC: 1028 U/L (ref 40–129)
ALT SERPL-CCNC: 32 U/L (ref 10–40)
ANION GAP SERPL CALCULATED.3IONS-SCNC: 15 MMOL/L (ref 3–16)
AST SERPL-CCNC: 67 U/L (ref 15–37)
BILIRUB SERPL-MCNC: 7.5 MG/DL (ref 0–1)
BUN SERPL-MCNC: 61 MG/DL (ref 7–20)
CALCIUM SERPL-MCNC: 7.8 MG/DL (ref 8.3–10.6)
CHLORIDE SERPL-SCNC: 99 MMOL/L (ref 99–110)
CO2 SERPL-SCNC: 24 MMOL/L (ref 21–32)
CREAT SERPL-MCNC: 5.6 MG/DL (ref 0.8–1.3)
DEPRECATED RDW RBC AUTO: 19.7 % (ref 12.4–15.4)
GFR SERPLBLD CREATININE-BSD FMLA CKD-EPI: 11 ML/MIN/{1.73_M2}
GLUCOSE BLD-MCNC: 71 MG/DL (ref 70–99)
GLUCOSE BLD-MCNC: 99 MG/DL (ref 70–99)
GLUCOSE SERPL-MCNC: 87 MG/DL (ref 70–99)
HCT VFR BLD AUTO: 25.3 % (ref 40.5–52.5)
HGB BLD-MCNC: 8.8 G/DL (ref 13.5–17.5)
MCH RBC QN AUTO: 31.7 PG (ref 26–34)
MCHC RBC AUTO-ENTMCNC: 34.9 G/DL (ref 31–36)
MCV RBC AUTO: 91 FL (ref 80–100)
MITOCHONDRIA M2 AB SER IA-ACNC: 1.5 U/ML (ref 0–4)
PATH INTERP BLD-IMP: NO
PERFORMED ON: NORMAL
PERFORMED ON: NORMAL
PLATELET # BLD AUTO: 247 K/UL (ref 135–450)
PLATELET BLD QL SMEAR: ADEQUATE
PMV BLD AUTO: 8.6 FL (ref 5–10.5)
POTASSIUM SERPL-SCNC: 4.4 MMOL/L (ref 3.5–5.1)
PROT SERPL-MCNC: 6 G/DL (ref 6.4–8.2)
RBC # BLD AUTO: 2.78 M/UL (ref 4.2–5.9)
SLIDE REVIEW: ABNORMAL
SODIUM SERPL-SCNC: 138 MMOL/L (ref 136–145)
WBC # BLD AUTO: 9 K/UL (ref 4–11)

## 2023-11-27 PROCEDURE — 6360000002 HC RX W HCPCS: Performed by: INTERNAL MEDICINE

## 2023-11-27 PROCEDURE — 2580000003 HC RX 258: Performed by: INTERNAL MEDICINE

## 2023-11-27 PROCEDURE — 1200000000 HC SEMI PRIVATE

## 2023-11-27 PROCEDURE — 80053 COMPREHEN METABOLIC PANEL: CPT

## 2023-11-27 PROCEDURE — 85027 COMPLETE CBC AUTOMATED: CPT

## 2023-11-27 PROCEDURE — 6370000000 HC RX 637 (ALT 250 FOR IP): Performed by: INTERNAL MEDICINE

## 2023-11-27 PROCEDURE — 97110 THERAPEUTIC EXERCISES: CPT

## 2023-11-27 PROCEDURE — 90935 HEMODIALYSIS ONE EVALUATION: CPT

## 2023-11-27 PROCEDURE — 97116 GAIT TRAINING THERAPY: CPT

## 2023-11-27 RX ADMIN — EPOETIN ALFA-EPBX 10000 UNITS: 10000 INJECTION, SOLUTION INTRAVENOUS; SUBCUTANEOUS at 10:06

## 2023-11-27 RX ADMIN — FAMOTIDINE 10 MG: 20 TABLET ORAL at 12:54

## 2023-11-27 RX ADMIN — SEVELAMER CARBONATE 800 MG: 800 TABLET, FILM COATED ORAL at 16:15

## 2023-11-27 RX ADMIN — HEPARIN SODIUM 5200 UNITS: 1000 INJECTION INTRAVENOUS; SUBCUTANEOUS at 10:52

## 2023-11-27 RX ADMIN — TAMSULOSIN HYDROCHLORIDE 0.4 MG: 0.4 CAPSULE ORAL at 12:54

## 2023-11-27 RX ADMIN — ASPIRIN 81 MG 81 MG: 81 TABLET ORAL at 12:53

## 2023-11-27 RX ADMIN — SEVELAMER CARBONATE 800 MG: 800 TABLET, FILM COATED ORAL at 12:55

## 2023-11-27 RX ADMIN — SODIUM CHLORIDE, PRESERVATIVE FREE 10 ML: 5 INJECTION INTRAVENOUS at 12:54

## 2023-11-27 RX ADMIN — SODIUM CHLORIDE, PRESERVATIVE FREE 10 ML: 5 INJECTION INTRAVENOUS at 20:25

## 2023-11-27 RX ADMIN — SEVELAMER CARBONATE 800 MG: 800 TABLET, FILM COATED ORAL at 07:53

## 2023-11-27 RX ADMIN — AMLODIPINE BESYLATE 5 MG: 5 TABLET ORAL at 12:54

## 2023-11-27 RX ADMIN — COLCHICINE 0.3 MG: 0.6 TABLET, FILM COATED ORAL at 12:53

## 2023-11-27 ASSESSMENT — PAIN SCALES - GENERAL: PAINLEVEL_OUTOF10: 0

## 2023-11-27 NOTE — PROGRESS NOTES
Treatment time: 3 hours  Net UF: 1420 ml     Pre weight: 54.3 kg  Post weight:52.9 kg  EDW: 52 kg      Access used: R-Femoral  Access function: well with  ml/min     Medications or blood products given: Retacit 10,000 units     Regular outpatient schedule: Mon,Tues,Thurs,Fri     Summary of response to treatment: Patient tolerated treatment well and without any complications. Patient remained stable throughout entire treatment and upon the patient exiting the dialysis suite via transport. Report given to Enrico Peoples RN and copy of dialysis treatment record placed in chart, to be scanned into EMR.

## 2023-11-27 NOTE — PROGRESS NOTES
Pt alert and oriented x4, VSS, left chest port is capped. Left arm fistula has good thrill and bruit. Right femoral vas cath intact and capped. Pt ambulates independently with a steady gait. Bedside table and call light in reach.

## 2023-11-27 NOTE — PROGRESS NOTES
Pts bps have been trending in 160s with latest bp 172/90 hr 74. Notified MD on call. No new orders at this time.

## 2023-11-27 NOTE — PROGRESS NOTES
Physical Therapy  Angelica Holman    Pt phyllis this AM for HD. Will try back at later time as pt able to participate.  Severa Aline [id number]

## 2023-11-27 NOTE — PROGRESS NOTES
[facility] Note    Patient Active Problem List   Diagnosis    Malignant neoplasm of lower third of esophagus (HCC)    CINV (chemotherapy-induced nausea and vomiting)    Hematemesis    Renal mass    CKD (chronic kidney disease) stage 3, GFR 30-59 ml/min (HCC)    Hypertension    Acute kidney injury superimposed on CKD (HCC)    Hydropneumothorax    Anemia of chronic renal failure    Clear cell carcinoma of kidney (HCC)    Malignant neoplasm of kidney (HCC)    Monoclonal gammopathy of unknown significance (MGUS)    ROCAEL (acute kidney injury) ([address])    Pneumothorax, right    CKD (chronic kidney disease) stage 4, GFR 15-29 ml/min (HCC)    Pneumothorax after biopsy    Hematuria    Symptomatic anemia    Anemia    Abdominal discomfort    Abnormal CT scan    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)    Abnormal weight loss    Allergy, unspecified, initial encounter    Anaphylactic shock, unspecified, initial encounter    Back pain    Low back pain    Benign neoplasm of colon    Benign prostatic hyperplasia    Benign prostatic hyperplasia without urinary obstruction    Carpal tunnel syndrome    Chest mass    Chronic diarrhea of unknown origin    Chronic viral hepatitis C (HCC)    Coagulation defect, unspecified ([address])    Costochondritis    Dehydration    Delayed gastric emptying    Dependence on renal dialysis ([address])    Diarrhea    Disorder of kidney and ureter    Dysphagia    Entrapment of left ulnar nerve at elbow    Esophageal adenocarcinoma (HCC)    External hemorrhoids    Fatigue due to treatment    Gastroesophageal reflux disease    Sliding hiatal hernia    Mixed hyperlipidemia    Hyperlipidemia    Impotence of organic origin    Iron deficiency anemia    Iron deficiency anemia, unspecified    Liver lesion    Loss of appetite    Malignant neoplasm of unspecified part of right bronchus or lung (HCC)    Nausea    Noninfective gastroenteritis and colitis, unspecified    Other idiopathic Status   11/27/2023 25.3 (L) 40.5 - 52.5 % Final     WBC   Date Value Ref Range Status   11/27/2023 9.0 4.0 - 11.0 K/uL Final     Platelets   Date Value Ref Range Status   11/27/2023 247 135 - 450 K/uL Final     Lab Results   Component Value Date    CREATININE 5.6 (HH) 11/27/2023    BUN 61 (H) 11/27/2023     11/27/2023    K 4.4 11/27/2023    CL 99 11/27/2023    CO2 24 11/27/2023     Assessment/Plan:    1. ESRD-follows with me as an outpatient. On HOME-HD training, 4 times a week with off days being Wednesday, Saturday and Sunday. He has a tunneled right femoral catheter. Patient also has a fistula in the left upper arm - by Dr Mariella Atkinson on 10-.   - Following with home dialysis therapy team outpt. .   - d/w vascular sx , had good T/B, but as per vasc. Sx - scheduled for 2ned stage bracio-basilic transposition 2-, until then they suggested to use TDDC. Appears euvolemic. S/p HD Monday. Using Na profile and lower temp. HD Tuesday   Off on Wednesday    2. Mild hyponatremia-follow with HD. Stable  3. Hypolbuminemia  4. Anemia due to CKD-FARHEEN with HD. Transfuse PRN. 5.  History of hypertension-controlled  6. Acute pericarditis-on colchicine  7. Elevated liver enzymes-per Medicine and GI. S/P ERCP and pancreatic stent. Bili decreasing. 8.  History of esophageal cancer stage IV, recently completed chemotherapy  9. Hyperkalemia- Low K diet. Better. 10.  Low serum HCO3-better. 10.  Disposition-per GI and Medicine      Medical decision making- high complexity. Multiple complex health problems. Discussed with patient and treatment team.  HD team, vascular team     Thank you for allowing me to participate in this patient's care. Please do not hesitate to contact me for any questions/concerns. We will follow along with you.      Danny Washburn MD  [facility] [address]   Phone: [phone] or [address]  Fax: [phone]    Severally ill, at risk

## 2023-11-27 NOTE — PROGRESS NOTES
Date of Admission: 11/17/2023. Hospital Day: 11       Assessment/Plan:   58 y.o. male who presented to ED for evaluation of chest pain, sob. Found to have acute pericarditis, improved with colchicine. Also had significant elevation in unconjugated troponin and alk phos. no evidence of liver mets. MRCP to be done today. Also  Has been progressively anemic without evidence of bleeding,ordered hemolytic anemia workup although low suspicion. Expected dc 11/21 if blood count and liver enzymes remain stable      Acute pericarditis: Resolved  - EKG mild but generalized ST elevation consistent with acute pericarditis. ? Viral , pt reported  cough, weakness  recently    Repeat EKG 1/19  show similar st elevations  - Troponin 147 > 124, chronically elevated in the setting of renal insufficiency, stable compared to prior troponin. No h/o CAD  -cont colcichine       Hyperbilirubinemia  Severely elevated bilirubin, total 13, direct 3 on admiison,  ALP 1500 . Staying relatively flat  , AST and ALT also elevated on admisision but trending down  Obtain hepatitis panel . Hbs Ag +, ab -ve . Could be false + from recent vaccination, d/w  GI dr Scotty Smith, will obtain Hep B DNA levels  Ct w contrast abd -ve for liver mets  MRCP with intrahepatic biliary dilation and normal distal CBD concerning for CBD stricture at mid or proximal CBD. ERCP on 11/22: Hilar stricture s/p biliary sphincterotomy, prophylactic pancreatic stent placement, small bile duct stone extraction, biliary brushings, and placement of a 7Fr 12cm biliary stent into the dilated left hepatic duct. On Levaquin. (Last day of antibiotics)  Monitor bilirubin. Bilirubin 7.5 this morning. No need to repeat ERCP per GI. Anemia, acute on chronic/ thromocytopenia  11/20 transfuse 1 unit PRBC  Hemoglobin yesterday morning 6.8. No active signs of bleeding. Patient was transfused one unit of blood yesterday.   Hemoglobin 8.8 this morning  Per GI, no colonoscopy or EGD is

## 2023-11-27 NOTE — PROGRESS NOTES
[address] [facility]   Phone: [phone]    Physical Therapy    [] Initial Evaluation            [x] Daily Treatment Note         [] Discharge Summary      Patient: Rufina Patrick   : 1960   MRN: [id number]   Date of Service:  2023  Admitting Diagnosis: Hyperbilirubinemia    Current Admission Summary: This is a 58 y.o. male who was brought in by self for chest pain that started this morning. Patient states that he has chest pain at rest and with deep breaths. He is also short of breath. He tells me that he is swelling of his legs as well. Patient tells me that he is a chronic kidney dialysis patient and on dialysis. His dialysis port is in his right groin. Patient also tells me that he has liver failure. Patient denies cough. Patient denies fever and chills. Patient denies any nausea or vomiting. He also tells me he has some left shoulder numbness but denies any fall or injury. The numbness does not change with movement of his left arm. Past Medical History:  has a past medical history of Anesthesia, Chronic diarrhea, Esophageal cancer ([address]), Hyperlipidemia, Kidney disease, Lung cancer ([address]), Neuropathy, and Prostate enlargement. Past Surgical History:  has a past surgical history that includes Upper gastrointestinal endoscopy (); Colonoscopy; Upper gastrointestinal endoscopy (2017); Tunneled venous port placement (Right, 2018); Upper gastrointestinal endoscopy (2018); Esophagus surgery; Kidney removal (Right, 2020); Colonoscopy (N/A, 2022); IR PORT PLACEMENT > 5 YEARS (2022); CT NEEDLE BIOPSY LUNG PERCUTANEOUS (2023); CT GUIDED CHEST TUBE (2023); other surgical history; IR TUNNELED CVC PLACE WO SQ PORT/PUMP > 5 YEARS (2023); Cystoscopy (Left, 2023); Bladder surgery (Left, 2023); ERCP (N/A, 2023);  Upper gastrointestinal endoscopy (N/A, 2023); and ERCP (11/22/2023). Discharge Recommendations: Abdulaziz Arzola scored a 19/24 on the AM-PAC short mobility form. Current research shows that an AM-PAC score of 18 or greater is typically associated with a discharge to the patient's home setting. Based on the patient's AM-PAC score and their current functional mobility deficits, it is recommended that the patient have 2-3 sessions per week of Physical Therapy at d/c to increase the patient's independence. At this time, this patient demonstrates the endurance and safety to discharge home with home health PT and a follow up treatment frequency of 2-3x/wk. Please see assessment section for further patient specific details. If patient discharges prior to next session this note will serve as a discharge summary. Please see below for the latest assessment towards goals. HOME HEALTH CARE: LEVEL 2 SOCIAL  - Initial home health evaluation to occur within 24-48 hours, in patient home   - Therapy to evaluate with goal of regaining prior level of functioning   - Therapy to evaluate if patient has [address] needs for personal care   -  evaluation within 24-48 hours, includes evaluation of resources and insurance to determine AL/IL/LTC/Medicaid options   - [place] on Aging Referral   - [address] [place] [address] to discuss home support and care needs post discharge within two weeks of discharge.       DME Required For Discharge: rollator (4 wheel walker), pulse oximeter, shower chair  Precautions/Restrictions: medium fall risk  Weight Bearing Restrictions: weight bearing as tolerated  [] Right Upper Extremity  [] Left Upper Extremity [] Right Lower Extremity  [] Left Lower Extremity     Required Braces/Orthotics: no braces required   [] Right  [] Left  Positional Restrictions:no positional restrictions    Pre-Admission Information   Lives With: spouse                  Type of Home: house  Home Layout: tri-level, bedroom/bathroom upstairs  Home Access:  1

## 2023-11-28 VITALS
OXYGEN SATURATION: 96 % | DIASTOLIC BLOOD PRESSURE: 81 MMHG | TEMPERATURE: 98.1 F | BODY MASS INDEX: 16.33 KG/M2 | RESPIRATION RATE: 16 BRPM | WEIGHT: 116.62 LBS | HEART RATE: 54 BPM | HEIGHT: 71 IN | SYSTOLIC BLOOD PRESSURE: 169 MMHG

## 2023-11-28 LAB
ALBUMIN SERPL-MCNC: 2.5 G/DL (ref 3.4–5)
ALBUMIN/GLOB SERPL: 0.8 {RATIO} (ref 1.1–2.2)
ALP SERPL-CCNC: 958 U/L (ref 40–129)
ALT SERPL-CCNC: 26 U/L (ref 10–40)
ANION GAP SERPL CALCULATED.3IONS-SCNC: 12 MMOL/L (ref 3–16)
AST SERPL-CCNC: 58 U/L (ref 15–37)
BASOPHILS # BLD: 0.1 K/UL (ref 0–0.2)
BASOPHILS NFR BLD: 0.7 %
BILIRUB SERPL-MCNC: 6.5 MG/DL (ref 0–1)
BUN SERPL-MCNC: 44 MG/DL (ref 7–20)
CALCIUM SERPL-MCNC: 8 MG/DL (ref 8.3–10.6)
CHLORIDE SERPL-SCNC: 102 MMOL/L (ref 99–110)
CO2 SERPL-SCNC: 26 MMOL/L (ref 21–32)
CREAT SERPL-MCNC: 4.5 MG/DL (ref 0.8–1.3)
DEPRECATED RDW RBC AUTO: 20.5 % (ref 12.4–15.4)
EOSINOPHIL # BLD: 0 K/UL (ref 0–0.6)
EOSINOPHIL NFR BLD: 0.2 %
GFR SERPLBLD CREATININE-BSD FMLA CKD-EPI: 14 ML/MIN/{1.73_M2}
GLUCOSE SERPL-MCNC: 83 MG/DL (ref 70–99)
HCT VFR BLD AUTO: 24.4 % (ref 40.5–52.5)
HGB BLD-MCNC: 8.3 G/DL (ref 13.5–17.5)
LYMPHOCYTES # BLD: 0.4 K/UL (ref 1–5.1)
LYMPHOCYTES NFR BLD: 3.6 %
MCH RBC QN AUTO: 31.4 PG (ref 26–34)
MCHC RBC AUTO-ENTMCNC: 33.8 G/DL (ref 31–36)
MCV RBC AUTO: 92.7 FL (ref 80–100)
MONOCYTES # BLD: 1.2 K/UL (ref 0–1.3)
MONOCYTES NFR BLD: 11 %
NEUTROPHILS # BLD: 8.9 K/UL (ref 1.7–7.7)
NEUTROPHILS NFR BLD: 84.5 %
PLATELET # BLD AUTO: 241 K/UL (ref 135–450)
PMV BLD AUTO: 8 FL (ref 5–10.5)
POTASSIUM SERPL-SCNC: 3.9 MMOL/L (ref 3.5–5.1)
PROT SERPL-MCNC: 5.8 G/DL (ref 6.4–8.2)
RBC # BLD AUTO: 2.64 M/UL (ref 4.2–5.9)
SODIUM SERPL-SCNC: 140 MMOL/L (ref 136–145)
WBC # BLD AUTO: 10.5 K/UL (ref 4–11)

## 2023-11-28 PROCEDURE — 97530 THERAPEUTIC ACTIVITIES: CPT

## 2023-11-28 PROCEDURE — 6370000000 HC RX 637 (ALT 250 FOR IP): Performed by: INTERNAL MEDICINE

## 2023-11-28 PROCEDURE — 97535 SELF CARE MNGMENT TRAINING: CPT

## 2023-11-28 PROCEDURE — 97110 THERAPEUTIC EXERCISES: CPT

## 2023-11-28 PROCEDURE — 36591 DRAW BLOOD OFF VENOUS DEVICE: CPT

## 2023-11-28 PROCEDURE — 85025 COMPLETE CBC W/AUTO DIFF WBC: CPT

## 2023-11-28 PROCEDURE — 90935 HEMODIALYSIS ONE EVALUATION: CPT

## 2023-11-28 PROCEDURE — 80053 COMPREHEN METABOLIC PANEL: CPT

## 2023-11-28 RX ORDER — COLCHICINE 0.6 MG/1
0.3 TABLET ORAL DAILY
Qty: 30 TABLET | Refills: 3 | Status: SHIPPED | OUTPATIENT
Start: 2023-11-28

## 2023-11-28 RX ADMIN — SEVELAMER CARBONATE 800 MG: 800 TABLET, FILM COATED ORAL at 08:07

## 2023-11-28 ASSESSMENT — PAIN SCALES - WONG BAKER: WONGBAKER_NUMERICALRESPONSE: 0

## 2023-11-28 ASSESSMENT — PAIN SCALES - GENERAL
PAINLEVEL_OUTOF10: 0

## 2023-11-28 NOTE — DISCHARGE INSTR - COC
Continuity of Care Form    Patient Name: Ruddy Luna   :  1960  MRN:  [id number]    Admit date:  2023  Discharge date:  23    Code Status Order: Full Code   Advance Directives:   [address] Documentation       Date/Time Healthcare Directive Type of Healthcare Directive Copy in [address] Agent's Name Healthcare Agent's Phone Number    [phone] Yes, patient has an advance directive for healthcare treatment Durable power of  for health care;Living will No, copy requested from family -- [facility] [phone]            Admitting Physician:  Camille Cramer MD  PCP: Yovana Sánchez MD    Discharging Nurse: [address] [place] [address] Unit/Room#: 0IC-0953/1683-46  Discharging Unit Phone Number: [phone]    Emergency Contact:   Extended Emergency Contact Information  Primary Emergency Contact: Ree Black  Address: [address] [place] [address], [address] [place] [address] Drive [facility] [address] [facility] Phone: [phone]  Mobile Phone: [phone]  Relation: Spouse    Past Surgical History:  Past Surgical History:   Procedure Laterality Date    BLADDER SURGERY Left 2023    CYSTOSCOPY WITH LEFT STENT REMOVAL performed by Yoon Yost MD at [address] [place] [address] N/A 2022    COLONOSCOPY WITH BIOPSY performed by Irene Garcia MD at [address] [place] [address]  2023    CT GUIDED CHEST TUBE 2023 Neel Buitrago MD [facility] CT SCAN    CT NEEDLE BIOPSY LUNG PERCUTANEOUS  2023    CT NEEDLE BIOPSY LUNG PERCUTANEOUS 2023 Neel Buitrago MD [facility] CT SCAN    CYSTOSCOPY Left 2023    CYSTOSCOPY, LEFT RETROGRADE PYELOGRAM, PLACEMENT OF LEFT URETERAL STENT performed by Yoon Yost MD at [address] [place] [address] N/A 2023    ERCP STENT INSERTION performed by Herbert Carrillo MD at [facility]  2023    ERCP BILIARY BRUSHING

## 2023-11-28 NOTE — PROGRESS NOTES
Treatment time: 2.5 hours  Net UF: 1000 ml     Pre weight: 53.9 kg  Post weight:52.9 kg    Access used: rtdc    Access function: well with  ml/min     Medications or blood products given: heparin dwells     Regular outpatient schedule: tts     Summary of response to treatment: Patient tolerated treatment we and without any complications. Patient remained stable throughout entire treatment and upon the pt exiting the dialysis suite via transport. Report given to ATUL Fitzpatrick and copy of dialysis treatment record placed in chart, to be scanned into EMR.

## 2023-11-28 NOTE — CARE COORDINATION
Cindy received a referral to this patient for a rollator. Rollator walker has been delivered to the patient's room. Thank you for the referral.  Electronically signed by Talha Higginbotham on 11/28/2023 at 3:42 PM  Cell ph# [phone]    NOTE: After 5:00 pm, Weekends, Holidays: Call [facility] at [phone] to coordinate delivery of home medical equipment.

## 2023-11-28 NOTE — PROGRESS NOTES
[facility]   Phone: [phone]    Physical Therapy    [] Initial Evaluation            [x] Daily Treatment Note         [] Discharge Summary      Patient: Chung Storm   : 1960   MRN: [id number]   Date of Service:  2023  Admitting Diagnosis: Hyperbilirubinemia    Current Admission Summary: This is a 58 y.o. male who was brought in by self for chest pain that started this morning. Patient states that he has chest pain at rest and with deep breaths. He is also short of breath. He tells me that he is swelling of his legs as well. Patient tells me that he is a chronic kidney dialysis patient and on dialysis. His dialysis port is in his right groin. Patient also tells me that he has liver failure. Patient denies cough. Patient denies fever and chills. Patient denies any nausea or vomiting. He also tells me he has some left shoulder numbness but denies any fall or injury. The numbness does not change with movement of his left arm. Past Medical History:  has a past medical history of Anesthesia, Chronic diarrhea, Esophageal cancer ([address]), Hyperlipidemia, Kidney disease, Lung cancer ([address]), Neuropathy, and Prostate enlargement. Past Surgical History:  has a past surgical history that includes Upper gastrointestinal endoscopy (); Colonoscopy; Upper gastrointestinal endoscopy (2017); Tunneled venous port placement (Right, 2018); Upper gastrointestinal endoscopy (2018); Esophagus surgery; Kidney removal (Right, 2020); Colonoscopy (N/A, 2022); IR PORT PLACEMENT > 5 YEARS (2022); CT NEEDLE BIOPSY LUNG PERCUTANEOUS (2023); CT GUIDED CHEST TUBE (2023); other surgical history; IR TUNNELED CVC PLACE WO SQ PORT/PUMP > 5 YEARS (2023); Cystoscopy (Left, 2023); Bladder surgery (Left, 2023); ERCP (N/A, 2023);  Upper gastrointestinal endoscopy (N/A, 2023); and ERCP continue to benefit from skilled PT in order to return to [facility] with all functional mobility. Safety Interventions: patient left in chair, call light within reach, gait belt, patient at risk for falls, nurse notified, and patient up ad khadar     Plan  Frequency: 3-5 x/per week  Current Treatment Recommendations: strengthening, balance training, functional mobility training, transfer training, gait training, stair training, endurance training, patient/caregiver education, safety education, and equipment evaluation/education    Goals  Patient Goals: Return home. Short Term Goals:  Time Frame: Discharge. Patient will complete bed mobility at modified independent   Patient will complete transfers at [facility]   Patient will ambulate 100 ft with use of rollator (4WRW) at [facility]  Patient will ascend/descend 8 stairs with (R) ascending handrail at modified independent    Above goals reviewed on 11/28/2023. All goals are ongoing at this time unless indicated above.       Therapy Session Time      Individual Group Co-treatment   Time In 0920       Time Out 4352       Minutes 55         Timed Code Treatment Minutes:  55 minutes  Total Treatment Minutes:  55 minutes       Electronically Signed By: Kait Coleman PT, DPT, [id number]

## 2023-11-28 NOTE — PROGRESS NOTES
[address] [facility] Department   Phone: [phone]    Occupational Therapy    [] Initial Evaluation            [x] Daily Treatment Note         [] Discharge Summary      Patient: Ted Olson   : 1960   MRN: [id number]   Date of Service:  2023    Admitting Diagnosis:  Hyperbilirubinemia  Current Admission Summary: This is a 58 y.o. male who was brought in by self for chest pain that started this morning. Patient states that he has chest pain at rest and with deep breaths. He is also short of breath. He tells me that he is swelling of his legs as well. Patient tells me that he is a chronic kidney dialysis patient and on dialysis. His dialysis port is in his right groin. Patient also tells me that he has liver failure. Patient denies cough. Patient denies fever and chills. Patient denies any nausea or vomiting. He also tells me he has some left shoulder numbness but denies any fall or injury. The numbness does not change with movement of his left arm. Past Medical History:  has a past medical history of Anesthesia, Chronic diarrhea, Esophageal cancer ([address]), Hyperlipidemia, Kidney disease, Lung cancer ([address]), Neuropathy, and Prostate enlargement. Past Surgical History:  has a past surgical history that includes Upper gastrointestinal endoscopy (); Colonoscopy; Upper gastrointestinal endoscopy (2017); Tunneled venous port placement (Right, 2018); Upper gastrointestinal endoscopy (2018); Esophagus surgery; Kidney removal (Right, 2020); Colonoscopy (N/A, 2022); IR PORT PLACEMENT > 5 YEARS (2022); CT NEEDLE BIOPSY LUNG PERCUTANEOUS (2023); CT GUIDED CHEST TUBE (2023); other surgical history; IR TUNNELED CVC PLACE WO SQ PORT/PUMP > 5 YEARS (2023); Cystoscopy (Left, 2023); Bladder surgery (Left, 2023); ERCP (N/A, 2023);  Upper gastrointestinal endoscopy (N/A, 2023); and ERCP

## 2023-11-28 NOTE — PROGRESS NOTES
Patient transferred to  to room 5582. Report given to [facility]. All questions answered. All patient belongings packed and sent with patient. Patient states he will notify wife of transfer.

## 2023-11-28 NOTE — PROGRESS NOTES
CLINICAL PHARMACY NOTE: MEDS TO BEDS    Total # of Prescriptions Filled: 1   The following medications were delivered to the patient:  COLCHICINE 0.6MG    Additional Documentation:  Delivered to patients room = signed  [address] [facility] [place]

## 2023-11-28 NOTE — CARE COORDINATION
SW met with patient to discuss discharge plans and recommendations. Pt was agreeable to [address] and 4 wheel walker with a seat. [address] list provided. Pt did not have a preference. [facility] could not accept. Referral was given to [facility] who accepted. Jahaira aDvis w/[facility] who delivered the 4 wheel walker to pt's room. Called Lorna at patient's dialysis facility [facility] and informed of discharge today. Faxed discharge paperwork to [phone]. All discharge needs met at this time. Discharge Plan:  Home w/[facility], 4 wheel walker w/seat from [place] and continuing dialysis at [facility].     Electronically signed by JIA Gomez, BRANDON on 11/28/2023 at 3:40 PM

## 2023-12-08 ENCOUNTER — TELEPHONE (OUTPATIENT)
Dept: VASCULAR SURGERY | Age: 63
End: 2023-12-08

## 2023-12-08 NOTE — TELEPHONE ENCOUNTER
Patient called to cancel surgery with Dr Ros Ayala on 12/14. He is no longer in need of surgery, he will not be receiving dialysis at home.

## 2023-12-17 PROBLEM — E86.0 DEHYDRATION: Status: RESOLVED | Noted: 2023-11-17 | Resolved: 2023-12-17

## 2024-01-01 ENCOUNTER — HOSPITAL ENCOUNTER (EMERGENCY)
Age: 64
Discharge: HOME | End: 2024-01-01
Attending: EMERGENCY MEDICINE | Admitting: EMERGENCY MEDICINE
Payer: MEDICARE

## 2024-01-01 ENCOUNTER — APPOINTMENT (OUTPATIENT)
Dept: CT IMAGING | Age: 64
End: 2024-01-01
Payer: MEDICARE

## 2024-01-01 VITALS
TEMPERATURE: 92 F | BODY MASS INDEX: 16.3 KG/M2 | WEIGHT: 116.84 LBS | RESPIRATION RATE: 18 BRPM | DIASTOLIC BLOOD PRESSURE: 72 MMHG | SYSTOLIC BLOOD PRESSURE: 126 MMHG | HEART RATE: 63 BPM | OXYGEN SATURATION: 98 %

## 2024-01-01 DIAGNOSIS — E16.2 HYPOGLYCEMIA: Primary | ICD-10-CM

## 2024-01-01 DIAGNOSIS — C15.9 ESOPHAGEAL CANCER, STAGE IV (HCC): ICD-10-CM

## 2024-01-01 LAB
AMMONIA PLAS-SCNC: 59 UMOL/L (ref 16–60)
ANION GAP SERPL CALCULATED.3IONS-SCNC: 16 MMOL/L (ref 3–16)
BASE EXCESS BLDV CALC-SCNC: 0.3 MMOL/L (ref -3–3)
BASOPHILS # BLD: 0.1 K/UL (ref 0–0.2)
BASOPHILS NFR BLD: 1.5 %
BUN SERPL-MCNC: 116 MG/DL (ref 7–20)
CALCIUM SERPL-MCNC: 7.2 MG/DL (ref 8.3–10.6)
CHLORIDE SERPL-SCNC: 100 MMOL/L (ref 99–110)
CO2 BLDV-SCNC: 60 MMOL/L
CO2 SERPL-SCNC: 25 MMOL/L (ref 21–32)
COHGB MFR BLDV: 3.8 % (ref 0–1.5)
CREAT SERPL-MCNC: 8.2 MG/DL (ref 0.8–1.3)
DEPRECATED RDW RBC AUTO: 19.8 % (ref 12.4–15.4)
EKG ATRIAL RATE: 58 BPM
EKG DIAGNOSIS: NORMAL
EKG P AXIS: 49 DEGREES
EKG P-R INTERVAL: 136 MS
EKG Q-T INTERVAL: 488 MS
EKG QRS DURATION: 96 MS
EKG QTC CALCULATION (BAZETT): 479 MS
EKG R AXIS: 64 DEGREES
EKG T AXIS: 64 DEGREES
EKG VENTRICULAR RATE: 58 BPM
EOSINOPHIL # BLD: 0 K/UL (ref 0–0.6)
EOSINOPHIL NFR BLD: 0 %
GFR SERPLBLD CREATININE-BSD FMLA CKD-EPI: 7 ML/MIN/{1.73_M2}
GLUCOSE BLD-MCNC: 12 MG/DL (ref 70–99)
GLUCOSE BLD-MCNC: 180 MG/DL (ref 70–99)
GLUCOSE BLD-MCNC: 267 MG/DL (ref 70–99)
GLUCOSE SERPL-MCNC: 8 MG/DL (ref 70–99)
HCO3 BLDV-SCNC: 25.3 MMOL/L (ref 23–29)
HCT VFR BLD AUTO: 21.5 % (ref 40.5–52.5)
HGB BLD-MCNC: 7.1 G/DL (ref 13.5–17.5)
LACTATE BLDV-SCNC: 1 MMOL/L (ref 0.4–2)
LYMPHOCYTES # BLD: 0.2 K/UL (ref 1–5.1)
LYMPHOCYTES NFR BLD: 2.1 %
MCH RBC QN AUTO: 33.8 PG (ref 26–34)
MCHC RBC AUTO-ENTMCNC: 33.1 G/DL (ref 31–36)
MCV RBC AUTO: 102.3 FL (ref 80–100)
METHGB MFR BLDV: 1.5 %
MONOCYTES # BLD: 0.4 K/UL (ref 0–1.3)
MONOCYTES NFR BLD: 5 %
NEUTROPHILS # BLD: 7.8 K/UL (ref 1.7–7.7)
NEUTROPHILS NFR BLD: 91.4 %
O2 CT VFR BLDV CALC: 10 VOL %
O2 THERAPY: ABNORMAL
PCO2 BLDV: 41.8 MMHG (ref 40–50)
PERFORMED ON: ABNORMAL
PH BLDV: 7.39 [PH] (ref 7.35–7.45)
PLATELET # BLD AUTO: 71 K/UL (ref 135–450)
PLATELET BLD QL SMEAR: ABNORMAL
PMV BLD AUTO: 11 FL (ref 5–10.5)
PO2 BLDV: 105 MMHG (ref 25–40)
POTASSIUM SERPL-SCNC: 4.2 MMOL/L (ref 3.5–5.1)
RBC # BLD AUTO: 2.1 M/UL (ref 4.2–5.9)
SAO2 % BLDV: 98 %
SLIDE REVIEW: ABNORMAL
SODIUM SERPL-SCNC: 141 MMOL/L (ref 136–145)
WBC # BLD AUTO: 8.5 K/UL (ref 4–11)

## 2024-01-01 PROCEDURE — 87040 BLOOD CULTURE FOR BACTERIA: CPT

## 2024-01-01 PROCEDURE — 82140 ASSAY OF AMMONIA: CPT

## 2024-01-01 PROCEDURE — 93010 ELECTROCARDIOGRAM REPORT: CPT | Performed by: INTERNAL MEDICINE

## 2024-01-01 PROCEDURE — 87150 DNA/RNA AMPLIFIED PROBE: CPT

## 2024-01-01 PROCEDURE — 85025 COMPLETE CBC W/AUTO DIFF WBC: CPT

## 2024-01-01 PROCEDURE — 99284 EMERGENCY DEPT VISIT MOD MDM: CPT

## 2024-01-01 PROCEDURE — 80048 BASIC METABOLIC PNL TOTAL CA: CPT

## 2024-01-01 PROCEDURE — 70450 CT HEAD/BRAIN W/O DYE: CPT

## 2024-01-01 PROCEDURE — 83605 ASSAY OF LACTIC ACID: CPT

## 2024-01-01 PROCEDURE — 82803 BLOOD GASES ANY COMBINATION: CPT

## 2024-01-01 PROCEDURE — 93005 ELECTROCARDIOGRAM TRACING: CPT | Performed by: EMERGENCY MEDICINE

## 2024-01-01 PROCEDURE — 2500000003 HC RX 250 WO HCPCS: Performed by: EMERGENCY MEDICINE

## 2024-01-01 RX ORDER — DEXTROSE MONOHYDRATE 25 G/50ML
50 INJECTION, SOLUTION INTRAVENOUS PRN
Status: DISCONTINUED | OUTPATIENT
Start: 2024-01-01 | End: 2024-01-01 | Stop reason: HOSPADM

## 2024-01-01 RX ORDER — DEXTROSE AND SODIUM CHLORIDE 5; .9 G/100ML; G/100ML
INJECTION, SOLUTION INTRAVENOUS CONTINUOUS
Status: DISCONTINUED | OUTPATIENT
Start: 2024-01-01 | End: 2024-01-01 | Stop reason: HOSPADM

## 2024-01-01 RX ADMIN — DEXTROSE MONOHYDRATE 50 G: 25 INJECTION, SOLUTION INTRAVENOUS at 06:44

## 2024-01-01 ASSESSMENT — PAIN SCALES - GENERAL: PAINLEVEL_OUTOF10: 0

## 2024-01-01 NOTE — ED PROVIDER NOTES
Emergency Department Encounter    Patient: Ed Black  MRN: [id number]  : 1960  Date of Evaluation: 2024  ED Provider:  RUFINO WEBSTER MD      Triage Chief Complaint:   Altered Mental Status      [place]:  Ed Black is a 63 y.o. male that presents to the ER for evaluation of nonresponsiveness, patient was a GCS of 6 on arrival.  History is obtained from the patient's wife.  He has a history of esophageal cancer with extensive metastases, as he had a history of end-stage renal disease, with hemodialysis on .  Dialysis catheter in his right groin.  His glucose on arrival was 12, he appeared to have evidence of marked ataxic respirations and possible seizure    ROS:  Unable to fully obtain given clinical condition    Past Medical History:   Diagnosis Date    Anesthesia     hypertension with anesthesia    Chronic diarrhea     patient reports chronic diarrhea for 1 year and sees a specialist at : has been checked for c.dif multiple times and always negative    Esophageal cancer (HCC)      and diagnosed again : started chemotherapy  23: on 2 different chemo treatment one is every 2 weeks and the other treatment is every 3 weeks    Hyperlipidemia     Kidney disease     Dialysis on  and     Lung cancer (HCC)     recent diagnosis     Neuropathy     Prostate enlargement      Past Surgical History:   Procedure Laterality Date    BLADDER SURGERY Left 2023    CYSTOSCOPY WITH LEFT STENT REMOVAL performed by Chalo Gonsales MD at [facility] OR    COLONOSCOPY      COLONOSCOPY N/A 2022    COLONOSCOPY WITH BIOPSY performed by Robert Trevino MD at [facility] ASC ENDOSCOPY    CT GUIDED CHEST TUBE  2023    CT GUIDED CHEST TUBE 2023 Koki Carbone MD [facility] CT SCAN    CT NEEDLE BIOPSY LUNG PERCUTANEOUS  2023    CT NEEDLE BIOPSY LUNG PERCUTANEOUS 2023 Koki Carbone MD [facility] CT SCAN    CYSTOSCOPY Left 2023

## 2024-01-01 NOTE — ED NOTES
Pt AAO x 3  Denies pain or complaint  Xavier Hugger in place  Wife at bedside  Breakfast tray ordered  Updated on plan of care.   Repeat BS is 180

## 2024-01-01 NOTE — ED NOTES
Assisted wife with dressing pt  Placed in WC  Discharge to home with wife  Pallative Care consult tomorrow

## 2024-01-02 ENCOUNTER — APPOINTMENT (OUTPATIENT)
Dept: GENERAL RADIOLOGY | Age: 64
DRG: 640 | End: 2024-01-02
Payer: MEDICARE

## 2024-01-02 ENCOUNTER — HOSPITAL ENCOUNTER (INPATIENT)
Age: 64
LOS: 1 days | Discharge: HOSPICE | DRG: 640 | End: 2024-01-03
Attending: EMERGENCY MEDICINE | Admitting: INTERNAL MEDICINE
Payer: MEDICARE

## 2024-01-02 DIAGNOSIS — N18.6 ESRD ON HEMODIALYSIS (HCC): ICD-10-CM

## 2024-01-02 DIAGNOSIS — E80.6 HYPERBILIRUBINEMIA: ICD-10-CM

## 2024-01-02 DIAGNOSIS — J90 PLEURAL EFFUSION: ICD-10-CM

## 2024-01-02 DIAGNOSIS — R62.7 FAILURE TO THRIVE IN ADULT: ICD-10-CM

## 2024-01-02 DIAGNOSIS — Z99.2 ESRD ON HEMODIALYSIS (HCC): ICD-10-CM

## 2024-01-02 DIAGNOSIS — E16.2 HYPOGLYCEMIA: Primary | ICD-10-CM

## 2024-01-02 DIAGNOSIS — T68.XXXA HYPOTHERMIA, INITIAL ENCOUNTER: ICD-10-CM

## 2024-01-02 PROBLEM — E43 SEVERE PROTEIN-CALORIE MALNUTRITION (HCC): Status: ACTIVE | Noted: 2023-06-15

## 2024-01-02 PROBLEM — G93.41 ACUTE METABOLIC ENCEPHALOPATHY: Status: ACTIVE | Noted: 2024-01-02

## 2024-01-02 LAB
ALBUMIN SERPL-MCNC: 2.4 G/DL (ref 3.4–5)
ALBUMIN/GLOB SERPL: 0.7 {RATIO} (ref 1.1–2.2)
ALP SERPL-CCNC: 952 U/L (ref 40–129)
ALT SERPL-CCNC: 40 U/L (ref 10–40)
ANION GAP SERPL CALCULATED.3IONS-SCNC: 19 MMOL/L (ref 3–16)
AST SERPL-CCNC: 99 U/L (ref 15–37)
BASOPHILS # BLD: 0 K/UL (ref 0–0.2)
BASOPHILS NFR BLD: 0.3 %
BILIRUB SERPL-MCNC: 4.8 MG/DL (ref 0–1)
BUN SERPL-MCNC: 133 MG/DL (ref 7–20)
CALCIUM SERPL-MCNC: 7.1 MG/DL (ref 8.3–10.6)
CHLORIDE SERPL-SCNC: 98 MMOL/L (ref 99–110)
CO2 SERPL-SCNC: 23 MMOL/L (ref 21–32)
CREAT SERPL-MCNC: 8.2 MG/DL (ref 0.8–1.3)
DEPRECATED RDW RBC AUTO: 20.1 % (ref 12.4–15.4)
EKG ATRIAL RATE: 48 BPM
EKG DIAGNOSIS: NORMAL
EKG P AXIS: 47 DEGREES
EKG P-R INTERVAL: 152 MS
EKG Q-T INTERVAL: 474 MS
EKG QRS DURATION: 106 MS
EKG QTC CALCULATION (BAZETT): 423 MS
EKG R AXIS: 50 DEGREES
EKG T AXIS: 38 DEGREES
EKG VENTRICULAR RATE: 48 BPM
EOSINOPHIL # BLD: 0 K/UL (ref 0–0.6)
EOSINOPHIL NFR BLD: 0 %
ETHANOLAMINE SERPL-MCNC: NORMAL MG/DL (ref 0–0.08)
GFR SERPLBLD CREATININE-BSD FMLA CKD-EPI: 7 ML/MIN/{1.73_M2}
GLUCOSE BLD-MCNC: 123 MG/DL (ref 70–99)
GLUCOSE BLD-MCNC: 124 MG/DL (ref 70–99)
GLUCOSE BLD-MCNC: 128 MG/DL (ref 70–99)
GLUCOSE BLD-MCNC: 136 MG/DL (ref 70–99)
GLUCOSE BLD-MCNC: 147 MG/DL (ref 70–99)
GLUCOSE BLD-MCNC: 166 MG/DL (ref 70–99)
GLUCOSE BLD-MCNC: 17 MG/DL (ref 70–99)
GLUCOSE BLD-MCNC: 64 MG/DL (ref 70–99)
GLUCOSE SERPL-MCNC: 13 MG/DL (ref 70–99)
HCT VFR BLD AUTO: 21.3 % (ref 40.5–52.5)
HGB BLD-MCNC: 7.4 G/DL (ref 13.5–17.5)
LACTATE BLDV-SCNC: 1.2 MMOL/L (ref 0.4–1.9)
LYMPHOCYTES # BLD: 0.2 K/UL (ref 1–5.1)
LYMPHOCYTES NFR BLD: 2.4 %
MCH RBC QN AUTO: 34.9 PG (ref 26–34)
MCHC RBC AUTO-ENTMCNC: 34.7 G/DL (ref 31–36)
MCV RBC AUTO: 100.7 FL (ref 80–100)
MONOCYTES # BLD: 0.6 K/UL (ref 0–1.3)
MONOCYTES NFR BLD: 6 %
NEUTROPHILS # BLD: 8.4 K/UL (ref 1.7–7.7)
NEUTROPHILS NFR BLD: 91.3 %
PERFORMED ON: ABNORMAL
PLATELET # BLD AUTO: 70 K/UL (ref 135–450)
PMV BLD AUTO: 9.8 FL (ref 5–10.5)
POTASSIUM SERPL-SCNC: 5 MMOL/L (ref 3.5–5.1)
PROT SERPL-MCNC: 5.7 G/DL (ref 6.4–8.2)
RBC # BLD AUTO: 2.12 M/UL (ref 4.2–5.9)
SODIUM SERPL-SCNC: 140 MMOL/L (ref 136–145)
TROPONIN, HIGH SENSITIVITY: 204 NG/L (ref 0–22)
TROPONIN, HIGH SENSITIVITY: 208 NG/L (ref 0–22)
WBC # BLD AUTO: 9.3 K/UL (ref 4–11)

## 2024-01-02 PROCEDURE — 71045 X-RAY EXAM CHEST 1 VIEW: CPT

## 2024-01-02 PROCEDURE — 82077 ASSAY SPEC XCP UR&BREATH IA: CPT

## 2024-01-02 PROCEDURE — 80053 COMPREHEN METABOLIC PANEL: CPT

## 2024-01-02 PROCEDURE — 96374 THER/PROPH/DIAG INJ IV PUSH: CPT

## 2024-01-02 PROCEDURE — 2580000003 HC RX 258: Performed by: INTERNAL MEDICINE

## 2024-01-02 PROCEDURE — 2500000003 HC RX 250 WO HCPCS

## 2024-01-02 PROCEDURE — 84484 ASSAY OF TROPONIN QUANT: CPT

## 2024-01-02 PROCEDURE — 93010 ELECTROCARDIOGRAM REPORT: CPT | Performed by: INTERNAL MEDICINE

## 2024-01-02 PROCEDURE — 83605 ASSAY OF LACTIC ACID: CPT

## 2024-01-02 PROCEDURE — 99285 EMERGENCY DEPT VISIT HI MDM: CPT

## 2024-01-02 PROCEDURE — 93005 ELECTROCARDIOGRAM TRACING: CPT | Performed by: EMERGENCY MEDICINE

## 2024-01-02 PROCEDURE — 2580000003 HC RX 258: Performed by: EMERGENCY MEDICINE

## 2024-01-02 PROCEDURE — 83525 ASSAY OF INSULIN: CPT

## 2024-01-02 PROCEDURE — 84681 ASSAY OF C-PEPTIDE: CPT

## 2024-01-02 PROCEDURE — 1200000000 HC SEMI PRIVATE

## 2024-01-02 PROCEDURE — 85025 COMPLETE CBC W/AUTO DIFF WBC: CPT

## 2024-01-02 RX ORDER — SODIUM CHLORIDE 0.9 % (FLUSH) 0.9 %
5-40 SYRINGE (ML) INJECTION EVERY 12 HOURS SCHEDULED
Status: DISCONTINUED | OUTPATIENT
Start: 2024-01-02 | End: 2024-01-03 | Stop reason: HOSPADM

## 2024-01-02 RX ORDER — DEXTROSE MONOHYDRATE 50 MG/ML
INJECTION, SOLUTION INTRAVENOUS CONTINUOUS
Status: DISCONTINUED | OUTPATIENT
Start: 2024-01-02 | End: 2024-01-03 | Stop reason: HOSPADM

## 2024-01-02 RX ORDER — GLUCAGON 1 MG/ML
1 KIT INJECTION PRN
Status: DISCONTINUED | OUTPATIENT
Start: 2024-01-02 | End: 2024-01-03 | Stop reason: HOSPADM

## 2024-01-02 RX ORDER — DEXTROSE, SODIUM CHLORIDE, SODIUM LACTATE, POTASSIUM CHLORIDE, AND CALCIUM CHLORIDE 5; .6; .31; .03; .02 G/100ML; G/100ML; G/100ML; G/100ML; G/100ML
INJECTION, SOLUTION INTRAVENOUS CONTINUOUS
Status: DISCONTINUED | OUTPATIENT
Start: 2024-01-02 | End: 2024-01-02

## 2024-01-02 RX ORDER — ACETAMINOPHEN 650 MG/1
650 SUPPOSITORY RECTAL EVERY 6 HOURS PRN
Status: DISCONTINUED | OUTPATIENT
Start: 2024-01-02 | End: 2024-01-03 | Stop reason: HOSPADM

## 2024-01-02 RX ORDER — DEXTROSE MONOHYDRATE 100 MG/ML
INJECTION, SOLUTION INTRAVENOUS CONTINUOUS PRN
Status: DISCONTINUED | OUTPATIENT
Start: 2024-01-02 | End: 2024-01-03 | Stop reason: HOSPADM

## 2024-01-02 RX ORDER — HYDROMORPHONE HYDROCHLORIDE 1 MG/ML
1 INJECTION, SOLUTION INTRAMUSCULAR; INTRAVENOUS; SUBCUTANEOUS
Status: DISCONTINUED | OUTPATIENT
Start: 2024-01-02 | End: 2024-01-03 | Stop reason: HOSPADM

## 2024-01-02 RX ORDER — DEXTROSE MONOHYDRATE 25 G/50ML
25 INJECTION, SOLUTION INTRAVENOUS ONCE
Status: COMPLETED | OUTPATIENT
Start: 2024-01-02 | End: 2024-01-02

## 2024-01-02 RX ORDER — POLYETHYLENE GLYCOL 3350 17 G/17G
17 POWDER, FOR SOLUTION ORAL DAILY PRN
Status: DISCONTINUED | OUTPATIENT
Start: 2024-01-02 | End: 2024-01-03 | Stop reason: HOSPADM

## 2024-01-02 RX ORDER — ONDANSETRON 4 MG/1
4 TABLET, ORALLY DISINTEGRATING ORAL EVERY 8 HOURS PRN
Status: DISCONTINUED | OUTPATIENT
Start: 2024-01-02 | End: 2024-01-03 | Stop reason: HOSPADM

## 2024-01-02 RX ORDER — DIAZEPAM 5 MG/ML
5 INJECTION, SOLUTION INTRAMUSCULAR; INTRAVENOUS EVERY 4 HOURS PRN
Status: DISCONTINUED | OUTPATIENT
Start: 2024-01-02 | End: 2024-01-03 | Stop reason: HOSPADM

## 2024-01-02 RX ORDER — SODIUM CHLORIDE 0.9 % (FLUSH) 0.9 %
5-40 SYRINGE (ML) INJECTION PRN
Status: DISCONTINUED | OUTPATIENT
Start: 2024-01-02 | End: 2024-01-03 | Stop reason: HOSPADM

## 2024-01-02 RX ORDER — ONDANSETRON 2 MG/ML
4 INJECTION INTRAMUSCULAR; INTRAVENOUS EVERY 6 HOURS PRN
Status: DISCONTINUED | OUTPATIENT
Start: 2024-01-02 | End: 2024-01-03 | Stop reason: HOSPADM

## 2024-01-02 RX ORDER — DEXTROSE MONOHYDRATE 25 G/50ML
INJECTION, SOLUTION INTRAVENOUS
Status: COMPLETED
Start: 2024-01-02 | End: 2024-01-02

## 2024-01-02 RX ORDER — ACETAMINOPHEN 325 MG/1
650 TABLET ORAL EVERY 6 HOURS PRN
Status: DISCONTINUED | OUTPATIENT
Start: 2024-01-02 | End: 2024-01-03 | Stop reason: HOSPADM

## 2024-01-02 RX ORDER — SODIUM CHLORIDE 9 MG/ML
INJECTION, SOLUTION INTRAVENOUS PRN
Status: DISCONTINUED | OUTPATIENT
Start: 2024-01-02 | End: 2024-01-03 | Stop reason: HOSPADM

## 2024-01-02 RX ORDER — HEPARIN SODIUM 1000 [USP'U]/ML
INJECTION, SOLUTION INTRAVENOUS; SUBCUTANEOUS
Status: DISPENSED
Start: 2024-01-02 | End: 2024-01-03

## 2024-01-02 RX ORDER — DEXTROSE AND SODIUM CHLORIDE 5; .45 G/100ML; G/100ML
INJECTION, SOLUTION INTRAVENOUS CONTINUOUS
Status: DISCONTINUED | OUTPATIENT
Start: 2024-01-02 | End: 2024-01-02

## 2024-01-02 RX ADMIN — DEXTROSE MONOHYDRATE 25 G: 25 INJECTION, SOLUTION INTRAVENOUS at 06:54

## 2024-01-02 RX ADMIN — DEXTROSE MONOHYDRATE: 50 INJECTION, SOLUTION INTRAVENOUS at 12:46

## 2024-01-02 RX ADMIN — DEXTROSE, SODIUM CHLORIDE, SODIUM LACTATE, POTASSIUM CHLORIDE, AND CALCIUM CHLORIDE: 5; .6; .31; .03; .02 INJECTION, SOLUTION INTRAVENOUS at 06:59

## 2024-01-02 ASSESSMENT — PAIN - FUNCTIONAL ASSESSMENT: PAIN_FUNCTIONAL_ASSESSMENT: NONE - DENIES PAIN

## 2024-01-02 NOTE — ACP (ADVANCE CARE PLANNING)
Advanced Care Planning Note.    Purpose of Encounter: Advanced care planning in light of metastatic esophageal cancer  Parties In Attendance: Patient, wife  Decisional Capacity: Yes  Subjective: Patient is weak and tired  Objective: Cr 8.2  Goals of Care Determination: Patient/POA wishes to focus on comfort and expressed the desired to pursue hospice.  Plan:  Hospice consult.  IV Dilaudid and IV Valium PRN comfort.    Code Status: DNR CC   Time spent on Advanced care Plannin minutes  Advanced Care Planning Documents: Completed advanced directives on chart, wife is the POA.    Dayo Lama MD  2024 9:11 AM

## 2024-01-02 NOTE — H&P
HOSPITALISTS HISTORY AND PHYSICAL    1/2/2024 9:11 AM    Patient Information:  LULY ESCOTO is a 63 y.o. male [id number]  PCP:  Igor Crowell MD (Tel: [phone] )    Chief complaint:    Chief Complaint   Patient presents with    Hypoglycemia     Pt cc hypoglycemia, pt brought in by family, pt bg 17 in triage.        History of Present Illness:  Luly Escoto is a 63 y.o. male with metastatic esophageal cancer no longer on treatment, ESRD on HD, severe PCMN who came to ER with altered mental status.  Patient found to have FS 13.  Obtunded in ED.  Started on dextrose IVF.  Began to arouse.  Cannot eat and has no appetite.  No CP, SOB, HA or fevers.  No nausea or vomiting.  No abdominal pain.  Missed HD this morning.  Came to ER with same symptoms yesterday and sent home with plan to see palliative care today.  Otherwise complete ROS is negative unless listed above.      REVIEW OF SYSTEMS:   Pertinent positives as noted in HPI.  All other systems were reviewed and are negative.      Past Medical History:   has a past medical history of Anesthesia, Chronic diarrhea, Esophageal cancer (HCC), Hyperlipidemia, Kidney disease, Lung cancer (HCC), Neuropathy, and Prostate enlargement.     Past Surgical History:   has a past surgical history that includes Upper gastrointestinal endoscopy (2017); Colonoscopy; Upper gastrointestinal endoscopy (05/17/2017); Tunneled venous port placement (Right, 02/08/2018); Upper gastrointestinal endoscopy (04/06/2018); Esophagus surgery; Kidney removal (Right, 09/21/2020); Colonoscopy (N/A, 11/09/2022); IR PORT PLACEMENT > 5 YEARS (12/08/2022); CT NEEDLE BIOPSY LUNG PERCUTANEOUS (05/24/2023); CT GUIDED CHEST TUBE (05/24/2023); other surgical history; IR TUNNELED CVC PLACE WO SQ PORT/PUMP > 5 YEARS (6/7/2023); Cystoscopy (Left, 6/30/2023); Bladder surgery (Left, 7/31/2023); ERCP

## 2024-01-02 NOTE — ED PROVIDER NOTES
EMERGENCY DEPARTMENT PROVIDER NOTE    Patient Identification  Pt Name: Ed Black  MRN: [id number]  Birthdate 1960  Date of evaluation: 1/2/2024  Provider: Luis Parisi DO  PCP: Igor Crowell MD    Chief Complaint  Hypoglycemia (Pt cc hypoglycemia, pt brought in by family, pt bg 17 in triage.)      HPI  (History provided by patient)  This is a 63 y.o. male with pertinent past medical history of metastatic esophageal cancer, ESRD on hemodialysis who was brought in by family for altered mental status.  On arrival to the emergency department, patient is obtunded, blood glucose was 17.  Patient received intravenous dextrose during my initial evaluation at which point he had immediately improved alertness.  Does not remember arriving to the hospital.  Reports feeling generally weak, however denies any fevers, chills, shortness of breath or chest pain.  Patient states he has been trying to eat but has no appetite, uncertain of his last meal.  He does not take any antiglycemics and denies history of diabetes.  Last dialysis was 2 days ago, due for routine dialysis today. Patient was seen in this emergency department yesterday for hypoglycemic episode and discharged to home after resolution.      I have reviewed the following nursing documentation:  Allergies: Patient has no known allergies.    Past medical history:   Past Medical History:   Diagnosis Date    Anesthesia     hypertension with anesthesia    Chronic diarrhea     patient reports chronic diarrhea for 1 year and sees a specialist at : has been checked for c.dif multiple times and always negative    Esophageal cancer (HCC)     2017 and diagnosed again 2023: started chemotherapy  6-6-23: on 2 different chemo treatment one is every 2 weeks and the other treatment is every 3 weeks    Hyperlipidemia     Kidney disease     Dialysis on Tuesday Thursday and Saturdays    Lung cancer (HCC)     recent diagnosis 2023    Neuropathy     Prostate

## 2024-01-03 VITALS
HEART RATE: 78 BPM | SYSTOLIC BLOOD PRESSURE: 132 MMHG | TEMPERATURE: 97.9 F | OXYGEN SATURATION: 94 % | WEIGHT: 116.84 LBS | BODY MASS INDEX: 16.3 KG/M2 | DIASTOLIC BLOOD PRESSURE: 72 MMHG | RESPIRATION RATE: 16 BRPM

## 2024-01-03 LAB
ANION GAP SERPL CALCULATED.3IONS-SCNC: 14 MMOL/L (ref 3–16)
BASOPHILS # BLD: 0.3 K/UL (ref 0–0.2)
BASOPHILS NFR BLD: 3 %
BUN SERPL-MCNC: 105 MG/DL (ref 7–20)
CALCIUM SERPL-MCNC: 6.5 MG/DL (ref 8.3–10.6)
CHLORIDE SERPL-SCNC: 99 MMOL/L (ref 99–110)
CO2 SERPL-SCNC: 23 MMOL/L (ref 21–32)
CREAT SERPL-MCNC: 7 MG/DL (ref 0.8–1.3)
DEPRECATED RDW RBC AUTO: 19.3 % (ref 12.4–15.4)
EOSINOPHIL # BLD: 0 K/UL (ref 0–0.6)
EOSINOPHIL NFR BLD: 0 %
GFR SERPLBLD CREATININE-BSD FMLA CKD-EPI: 8 ML/MIN/{1.73_M2}
GLUCOSE BLD-MCNC: 111 MG/DL (ref 70–99)
GLUCOSE BLD-MCNC: 113 MG/DL (ref 70–99)
GLUCOSE SERPL-MCNC: 106 MG/DL (ref 70–99)
HCT VFR BLD AUTO: 18.1 % (ref 40.5–52.5)
HGB BLD-MCNC: 6.2 G/DL (ref 13.5–17.5)
LYMPHOCYTES # BLD: 0.2 K/UL (ref 1–5.1)
LYMPHOCYTES NFR BLD: 2.4 %
MCH RBC QN AUTO: 34.4 PG (ref 26–34)
MCHC RBC AUTO-ENTMCNC: 34.3 G/DL (ref 31–36)
MCV RBC AUTO: 100.4 FL (ref 80–100)
MONOCYTES # BLD: 0.3 K/UL (ref 0–1.3)
MONOCYTES NFR BLD: 3.9 %
NEUTROPHILS # BLD: 7.7 K/UL (ref 1.7–7.7)
NEUTROPHILS NFR BLD: 90.7 %
PERFORMED ON: ABNORMAL
PERFORMED ON: ABNORMAL
PLATELET # BLD AUTO: 61 K/UL (ref 135–450)
PMV BLD AUTO: 8.6 FL (ref 5–10.5)
POTASSIUM SERPL-SCNC: 3.9 MMOL/L (ref 3.5–5.1)
RBC # BLD AUTO: 1.8 M/UL (ref 4.2–5.9)
REPORT: NORMAL
SODIUM SERPL-SCNC: 136 MMOL/L (ref 136–145)
WBC # BLD AUTO: 8.5 K/UL (ref 4–11)

## 2024-01-03 PROCEDURE — 85025 COMPLETE CBC W/AUTO DIFF WBC: CPT

## 2024-01-03 PROCEDURE — 90935 HEMODIALYSIS ONE EVALUATION: CPT

## 2024-01-03 PROCEDURE — 2580000003 HC RX 258: Performed by: INTERNAL MEDICINE

## 2024-01-03 PROCEDURE — 97535 SELF CARE MNGMENT TRAINING: CPT

## 2024-01-03 PROCEDURE — 80048 BASIC METABOLIC PNL TOTAL CA: CPT

## 2024-01-03 PROCEDURE — 97165 OT EVAL LOW COMPLEX 30 MIN: CPT

## 2024-01-03 PROCEDURE — 97161 PT EVAL LOW COMPLEX 20 MIN: CPT

## 2024-01-03 PROCEDURE — 97530 THERAPEUTIC ACTIVITIES: CPT

## 2024-01-03 RX ADMIN — DEXTROSE MONOHYDRATE: 50 INJECTION, SOLUTION INTRAVENOUS at 01:01

## 2024-01-03 ASSESSMENT — ENCOUNTER SYMPTOMS
TROUBLE SWALLOWING: 1
BACK PAIN: 1
EYES NEGATIVE: 1
ALLERGIC/IMMUNOLOGIC NEGATIVE: 1
SHORTNESS OF BREATH: 1

## 2024-01-03 NOTE — CONSULTS
PALLIATIVE MEDICINE CONSULTATION     Patient name:Ed Black   MRN:[id number]    :1960  Room/Bed:[facility]-4459/4459-01   LOS: 1 day         Date of consult:1/3/2024    Inpatient consult to Palliative Care  Consult performed by: Rosy Pereira APRN - CNP  Consult ordered by: Dayo Lama MD  Reason for consult: GOC and code status              ASSESSMENT/RECOMMENDATIONS     63 y.o. male with hypoglycemia and FTT with metastatic cancer and ESRD      Symptom Management:  FTT- pt has lost weight and is unable to eat due to throat pain and stricture related to his advanced cancer was admitted with Hypoglycemia difficulty keeping BS up this admission   ESRD- pt and family have made decision to stop dialysis pt is no longer able to tolerate Cr 8.2  Goals of Care- Pt has already enrolled yesterday with [facility] and code updated to DNC. The wife is at bedside and planning to transport pt home today [facility] to meet them at home with equipment. No additional questions or needs.     Patient/Family Goals of Care :    [facility] and code updated to DNRCC. The wife is at bedside and planning to transport pt [facility] meet them at home with equipment. No additional questions or needs.     Disposition/Discharge Plan:   pending    Advance Directives:      The patient has appointed the following active healthcare agents:    Primary Decision Maker: Ree Black - Spouse - [phone]    The Patient has the following current code status:    Code Status: DNR-CC      Interactive exchange regarding medications,tests and procedures with: patient, floor RN, Dr Reyes   Thank you for allowing us to participate in the care of this patient.      HISTORY     CC: FTT  HPI: The patient is a 63 y.o. male with  metastatic esophageal cancer no longer on treatment, ESRD on HD, severe PCMN who came to ER with altered mental status.   
Refer to the consult note from today.     Thank you for allowing me to participate in this patient's care. Please do not hesitate to contact me for any questions/concerns. We will follow along with you.     Dagoberto Weeks MD  [facility]   Phone: [phone] or [employer]  Fax: [phone]     
with right lung volume loss. 2. Small to moderate left pleural effusion with left basilar volume loss.     CT HEAD WO CONTRAST    Result Date: 1/1/2024  EXAMINATION: CT OF THE HEAD WITHOUT CONTRAST  1/1/2024 7:10 am TECHNIQUE: CT of the head was performed without the administration of intravenous contrast. Automated exposure control, iterative reconstruction, and/or weight based adjustment of the mA/kV was utilized to reduce the radiation dose to as low as reasonably achievable. COMPARISON: None. HISTORY: ORDERING SYSTEM PROVIDED HISTORY: ams ro ich TECHNOLOGIST PROVIDED HISTORY: Reason for exam:->ams ro ich Has a \"code stroke\" or \"stroke alert\" been called?->No Decision Support Exception - unselect if not a suspected or confirmed emergency medical condition->Emergency Medical Condition (MA) Reason for Exam: ams ro ich Relevant Medical/Surgical History: Altered Mental Status FINDINGS: BRAIN/VENTRICLES: There is no acute intracranial hemorrhage, mass effect or midline shift. No abnormal extra-axial fluid collection.  The gray-white differentiation is maintained without evidence of an acute infarct. There is prominence of the ventricles and sulci due to global parenchymal volume loss. There are nonspecific areas of hypoattenuation within the periventricular and subcortical white matter, which likely represent chronic microvascular ischemic change. ORBITS: The visualized portion of the orbits demonstrate no acute abnormality. SINUSES: The visualized paranasal sinuses and mastoid air cells demonstrate no acute abnormality. SOFT TISSUES/SKULL: No acute abnormality of the visualized skull or soft tissues.     No acute intracranial abnormality.           ======================================================================  Please note that this chart entry has been generated using voice recognition software, mainly.  So please excuse brevity and/or typos.  While every effort and attempts have been made to ensure the

## 2024-01-03 NOTE — DISCHARGE SUMMARY
hours.  UA:  Lab Results   Component Value Date/Time    NITRU Negative 07/14/2023 07:30 AM    COLORU ORANGE 07/14/2023 07:30 AM    PHUR 6.0 07/14/2023 07:30 AM    WBCUA  07/14/2023 07:30 AM    RBCUA 21-50 07/14/2023 07:30 AM    BACTERIA 3+ 07/14/2023 07:30 AM    CLARITYU TURBID 07/14/2023 07:30 AM    SPECGRAV 1.020 07/14/2023 07:30 AM    LEUKOCYTESUR LARGE 07/14/2023 07:30 AM    UROBILINOGEN 1.0 07/14/2023 07:30 AM    BILIRUBINUR Negative 07/14/2023 07:30 AM    BLOODU LARGE 07/14/2023 07:30 AM    GLUCOSEU Negative 07/14/2023 07:30 AM    KETUA Negative 07/14/2023 07:30 AM    AMORPHOUS Present 10/26/2022 08:59 PM     Urine Cultures: No results found for: \"LABURIN\"  Blood Cultures:   Lab Results   Component Value Date/Time    BC  01/01/2024 07:09 AM     Gram stain Aerobic bottle:  Gram positive cocci in clusters  resembling Staphylococcus  Information to follow      BC See additional report for complete BCID panel. 01/01/2024 07:09 AM     Lab Results   Component Value Date/Time    BLOODCULT2  01/01/2024 07:43 AM     No Growth to date.  Any change in status will be called.     Organism:   Lab Results   Component Value Date/Time    ORG Staphylococcus epidermidis DNA Detected 01/01/2024 07:09 AM           Discharge Medications:        Medication List        CONTINUE taking these medications      medical marijuana            STOP taking these medications      amLODIPine 5 MG tablet  Commonly known as: NORVASC     apixaban 2.5 MG Tabs tablet  Commonly known as: ELIQUIS     colchicine 0.6 MG tablet  Commonly known as: COLCRYS     dexAMETHasone 4 MG/ML injection  Commonly known as: DECADRON     diphenoxylate-atropine 2.5-0.025 MG per tablet  Commonly known as: LOMOTIL     famotidine 20 MG tablet  Commonly known as: PEPCID     famotidine 40 MG/4ML injection  Commonly known as: PEPCID     fluorouracil 2.5 GM/50ML chemo injection  Commonly known as: ADRUCIL     Iron-Vit C-Vit B12-Folic Acid 100-250-0.025-1 MG Tabs

## 2024-01-03 NOTE — PLAN OF CARE
Problem: Discharge Planning  Goal: Discharge to home or other facility with appropriate resources  Outcome: Completed     Problem: Safety - Adult  Goal: Free from fall injury  1/3/2024 1259 by Xiao Figueroa, RN  Outcome: Completed  1/3/2024 0148 by Amaya Turner, RN  Outcome: Progressing     Problem: Skin/Tissue Integrity  Goal: Absence of new skin breakdown  Description: 1.  Monitor for areas of redness and/or skin breakdown  2.  Assess vascular access sites hourly  3.  Every 4-6 hours minimum:  Change oxygen saturation probe site  4.  Every 4-6 hours:  If on nasal continuous positive airway pressure, respiratory therapy assess nares and determine need for appliance change or resting period.  Outcome: Completed     Problem: ABCDS Injury Assessment  Goal: Absence of physical injury  Outcome: Completed

## 2024-01-03 NOTE — PROGRESS NOTES
Clinical Pharmacy Note: Positive Blood Culture Documentation    Pharmacy was notified by lab that Ed Black has positive blood cultures.    Patient is positive for Staphylococcus epidermidis in one out of two sets.  Resistance markers present: none    Name of receiving pharmacist: Xiao Panda    Patient is not on antibiotics, however this is most likely a contaminant       Dr. Barrios was notified of the positive result at 11:53.    No new antimicrobials initiated after physician discussion    Thank you,  Umer Worthy, PharmD [facility]  PGY-1 Resident  [id number]    
    MD Ghassan Darling MD Samir Brahmbhatt, MD                Office: [phone]                      Fax: [phone]          Inpatient Dialysis Progress Note:     PATIENT NAME: Ed Black  : 1960  MRN: [id number]    Indication for Dialysis: ESRD      After discussion , Wife and pt now wants hospice after HD today   They want to stop HD    Consult hospice       Patient seen on dialysis treatment.  Tolerating treatment  Fairly well    Vitals:    24 1245   BP:    Pulse:    Resp:    Temp: 96.9 °F (36.1 °C)   SpO2:        PHYSICAL EXAM:  General: no acute distress   Neck: Supple, JVD not visible.   CVS:  S1(+), S2(+),    RS: Normal respiratory effort, Breat sound: diminished at bases.    Abd: Soft , bowel sounds are normal, no distension . ,   CNS: Awake Oriented x3,    Extremities/MSK:  Edema,        Labs Reviewed  by me   Labs   Lab Results   Component Value Date    CREATININE 8.2 (HH) 2024     (HH) 2024     2024    K 5.0 2024    CL 98 (L) 2024    CO2 23 2024     Lab Results   Component Value Date    WBC 9.3 2024    HGB 7.4 (L) 2024    HCT 21.3 (L) 2024    .7 (H) 2024    PLT 70 (L) 2024       Dialysis Treatment and Prescription reviewed    RX:  See dialysis flowsheet for specifics on access, blood flow rate, dialysate baths, duration of dialysis, anticoagulation and other technical information.    COMMENTS:  Stable on dialysis.    Continue to Target dry weight and clearance.    Monitor closely for any hypotension    :Tolerating dialysis well, with no complications.  Hypotension on dialysis-stable to improved.  Good flow access.  :Anemia. Stable.Continue Aransep.    Fluid removal  not neededStable from Renal.    Please refer to the orders.   Dialysis treatment plan and dialysis orders discussed with dialysis RN   Discussed with Patient.    Thank you for allowing me 
  [facility] - Inpatient Rehabilitation Department   Phone: [phone]    Physical Therapy    [x] Initial Evaluation            [] Daily Treatment Note         [x] Discharge Summary      Patient: Ed Black   : 1960   MRN: [id number]   Date of Service:  1/3/2024  Admitting Diagnosis: Hypoglycemia  Current Admission Summary: Ed Black is a 63 y.o. male with metastatic esophageal cancer no longer on treatment, ESRD on HD, severe PCMN who came to ER with altered mental status.  Patient found to have FS 13.  Obtunded in ED.  Started on dextrose IVF.  Began to arouse.  Cannot eat and has no appetite.  No CP, SOB, HA or fevers.  No nausea or vomiting.  No abdominal pain.  Missed HD this morning.  Came to ER with same symptoms yesterday and sent home with plan to see palliative care today.  Otherwise complete ROS is negative unless listed above.   Past Medical History:  has a past medical history of Anesthesia, Chronic diarrhea, Esophageal cancer (HCC), Hyperlipidemia, Kidney disease, Lung cancer (HCC), Neuropathy, and Prostate enlargement.  Past Surgical History:  has a past surgical history that includes Upper gastrointestinal endoscopy (); Colonoscopy; Upper gastrointestinal endoscopy (2017); Tunneled venous port placement (Right, 2018); Upper gastrointestinal endoscopy (2018); Esophagus surgery; Kidney removal (Right, 2020); Colonoscopy (N/A, 2022); IR PORT PLACEMENT > 5 YEARS (2022); CT NEEDLE BIOPSY LUNG PERCUTANEOUS (2023); CT GUIDED CHEST TUBE (2023); other surgical history; IR TUNNELED CVC PLACE WO SQ PORT/PUMP > 5 YEARS (2023); Cystoscopy (Left, 2023); Bladder surgery (Left, 2023); ERCP (N/A, 2023); Upper gastrointestinal endoscopy (N/A, 2023); and ERCP (2023).  Discharge Recommendations: No PT services recommended at discharge. Pt and family plan to discharge home with hospice   DME Required For 
3T nurse called this nurse around 2.31PM about pt low HR in tele. On my way to assess pt in dialysis Rapid was called by dialysis nurse. Pt was unresponsive. Dialysis was paused.   Pt back to room at round 3PM. Pt had BM in the brief, cleaned and changed brief.   
Admission nurse at ED 02 to initiate admission process, but transport arrived right behind me to take patient to IP room.  Admission not initiated.  
Discharge instructions given, hospice is all set and ready for patient with equipment ready. No further needs  Xiao Figueroa RN    
Dr. Lama now speaking to wife, eRe over the phone regarding update about patient.   
Left unit with transport for dialysis.   
Rapid Response Quick Summary    Room: [address] [place], [place] [address]/4459-01    Assessment of concern / patient:  Patient became unresponsive and hypotensive during hemodialysis     Physician involved:  Dr Rowley     Interventions:  Monitoring of vitals and oxygen levels. Blood sugar checked,  IV fluid boluses given per HD RN     Disposition:  To transfer back to assigned room  
Treatment time: 1 hour and 10 minutes  Net UF: +1200 ml     Pre weight: 53 kg  Post weight:54.2 kg  EDW: TBD kg         Access used: LTDC    Access function: well with      Summary of response to treatment: The patient became unresponsive at 14:33.  B/P 47/32 P 56.  14:35 51/29/47 14:36 62/37 Pt received saline with no change.  Rapid response called at at 14:38.  The patient remained unresponsive.  He received 1500ml of NS and oxygen started.  The MD at bedside.  Treatment was terminated at 14:40 ending B/P 154/80.  The patient was transported back to his room.    
Activities of Daily Living  Toileting: dependent.    Toileting Comments: brief change completed following episode of incontinence  General Comments: rolling in bed for brief change  Instrumental Activities of Daily Living  No IADL completed on this date.    Functional Mobility  Bed Mobility:  Rolling Left: moderate assistance, maximum assistance  Rolling Right: moderate assistance, maximum assistance  Scooting: dependent assistance  Comments: education for need for speciality bed, educated for importance of repositioning to prevent pressure wounds  Transfers:  No transfers completed on this date secondary to does not complete at baseline.  Comments:  Functional Mobility  No functional mobility completed on this date secondary to non ambulatory at baseline.  Balance:  Pt unable to tolerate sitting/standing position during for rating of balance component.   Comments:    Other Therapeutic Interventions    Functional Outcomes  AM-PAC Inpatient Daily Activity Raw Score: 6    Cognition  WFL  Orientation:    alert and oriented x 4  Command Following:   accurately follows one step commands   Education  Barriers To Learning: none  Patient Education: patient educated on goals, plan of care, precautions, ADL adaptive strategies, discharge recommendations  Learning Assessment:  patient verbalizes understanding, would benefit from continued reinforcement  Assessment  Activity Tolerance: poor  Impairments Requiring Therapeutic Intervention: decreased functional mobility, decreased ADL status, decreased endurance  Prognosis: poor  Clinical Assessment: Pt is below baseline level of function w/ quick decline in function since last hospital admission on 11/2023. Pt and family planning on pt d/c home w/ hospice w/ 24/7 assistance for ADLs. No further OT warranted.   Safety Interventions: patient left in bed, bed alarm in place, nurse notified, and family/caregiver present    Plan  Frequency: Eval with same day discharge.  No follow up

## 2024-01-05 LAB
BACTERIA BLD CULT ORG #2: NORMAL
BACTERIA BLD CULT: ABNORMAL
BACTERIA BLD CULT: ABNORMAL
C PEPTIDE SERPL-MCNC: 4.1 NG/ML (ref 1.1–4.4)
INSULIN COMMENT: NORMAL
INSULIN REFERENCE RANGE:: NORMAL
INSULIN SERPL-ACNC: 15.4 MU/L
ORGANISM: ABNORMAL
ORGANISM: ABNORMAL

## 2024-07-29 NOTE — ED NOTES
Food tray ordered for pt.   
Hospice RN bedside speaking with pt and wife.   Plan is to admit pt today for dialysis and when discharged pt will be under hospice care.   
Pt transported to 4T via ER stretcher at this time. No sign of distress. Hospice RN accompanying pt up to room.   
Returned pt call, pt not available.  
This RN spoke with hospice who called regarding pt, states a hospice nurse will come speak with pt and family today.   
01/02/24 0859 01/02/24 0900   BP:    111/73   Pulse: 60  60 60   Resp: 12  15    Temp:       SpO2:  98%       FiO2 (%):   O2 Flow Rate: O2 Device: None (Room air)    Cardiac Rhythm:    Pain Assessment:  [] Verbal [] [place] Baker Scale  Pain Scale: Pain Assessment  Pain Assessment: None - Denies Pain  Last documented pain score (0-10 scale)    Last documented pain medication administered: none  Mental Status: oriented and alert  Orientation Level:    NIH Score:    C-SSRS: Risk of Suicide: No Risk  Bedside swallow:    Uri Coma Scale (GCS): Uri Coma Scale  Eye Opening: To speech  Best Verbal Response: Confused  Best Motor Response: Withdraws from pain  [place] Coma Scale Score: 11  Active LDA's:    PO Status: Dysphagia, Level 1-Puree  Pertinent or High Risk Medications/Drips: no   If Yes, please provide details:   Pending Blood Product Administration: no       You may also review the ED PT Care Timeline found under the Summary Nursing Index tab.    Recommendation    Pending orders - All ED orders complete   Plan for Discharge (if known): hospice possibly   Additional Comments: pt on continuous LR with D5 100ml/hr.    If any further questions, please call Sending RN at [phone]    Electronically signed by: Electronically signed by Yin Olivera RN on 1/2/2024 at 9:30 AM

## 2024-12-31 NOTE — PROGRESS NOTES
Case discussed with surgeon, Dr Una Umaña. Concern for possible esophageal perforation as hpatient has been having vomiting and retching along with right basilar pneumothorax. Case discussed with radiologist, Amado Wallis who recommended to get gastrografin study to r/o esophageal perforation. Study ordered stat in epic. Patient declined COVID vaccine

## (undated) DEVICE — BAG U-BAG PLASTIC 10OZ MICROPORE ADHES

## (undated) DEVICE — BW-412T DISP COMBO CLEANING BRUSH: Brand: SINGLE USE COMBINATION CLEANING BRUSH

## (undated) DEVICE — CONTAINER DENT 8OZ AQUA W/ LID

## (undated) DEVICE — PUSHING CATHETER: Brand: COOK

## (undated) DEVICE — Device: Brand: BALLOON3

## (undated) DEVICE — Device: Brand: MEDEX

## (undated) DEVICE — SPONGE LAP W18XL18IN WHT COT 4 PLY FLD STRUNG RADPQ DISP ST

## (undated) DEVICE — WET SKIN PREP TRAY: Brand: MEDLINE INDUSTRIES, INC.

## (undated) DEVICE — SUTURE PDS II SZ 0 L60IN ABSRB VLT L48MM CTX 1/2 CIR Z990G

## (undated) DEVICE — AIRSEAL 5 MM ACCESS PORT AND LOW PROFILE OBTURATOR WITH BLADELESS OPTICAL TIP, 120 MM LENGTH: Brand: AIRSEAL

## (undated) DEVICE — GLOVE ORANGE PI 7 1/2   MSG9075

## (undated) DEVICE — ELECTRODE PT RET AD L9FT HI MOIST COND ADH HYDRGEL CORDED

## (undated) DEVICE — ENDOSCOPIC KIT 6X3/16 FT COLON W/ 1.1 OZ 2 GWN W/O BRSH

## (undated) DEVICE — BLANKET WRM W29.9XL79.1IN UP BODY FORC AIR MISTRAL-AIR

## (undated) DEVICE — INSUFFLATION NEEDLE TO ESTABLISH PNEUMOPERITONEUM.: Brand: INSUFFLATION NEEDLE

## (undated) DEVICE — ROBOTIC PK

## (undated) DEVICE — GLOVE ORTHO 8   MSG9480

## (undated) DEVICE — TIP COVER ACCESSORY

## (undated) DEVICE — SOLUTION IV IRRIG WATER 500ML POUR BRL ST 2F7113

## (undated) DEVICE — ADHESIVE SKIN CLSR 0.7ML TOP DERMBND ADV

## (undated) DEVICE — CYSTO PACK: Brand: MEDLINE INDUSTRIES, INC.

## (undated) DEVICE — REDUCER: Brand: ENDOWRIST

## (undated) DEVICE — POSITIONER HD W8XH4XL8.5IN RASPBERRY FOAM SLT

## (undated) DEVICE — PROTECTOR EYE PT SELF ADH NS OPT GRD LF

## (undated) DEVICE — GOWN AURORA NONREINF LG: Brand: MEDLINE INDUSTRIES, INC.

## (undated) DEVICE — MOUTHPIECE ENDOSCP L CTRL OPN AND SIDE PORTS DISP

## (undated) DEVICE — Device: Brand: SINGLE USE SOFT BRUSH

## (undated) DEVICE — SOLUTION IRRIG 1000ML STRL H2O USP PLAS POUR BTL

## (undated) DEVICE — CANNULA SEAL

## (undated) DEVICE — TOTAL TRAY, DB, 100% SILI FOLEY, 16FR 10: Brand: MEDLINE

## (undated) DEVICE — GUIDEWIRE ENDOSCP L150CM DIA0.035IN TIP 3CM PTFE NIT

## (undated) DEVICE — CHLORAPREP 26ML ORANGE

## (undated) DEVICE — 30977 SEE SHARP - ENHANCED INTRAOPERATIVE LAPAROSCOPE CLEANING & DEFOGGING: Brand: 30977 SEE SHARP - ENHANCED INTRAOPERATIVE LAPAROSCOPE CLEANING & DEFOGGING

## (undated) DEVICE — STAPLER SHEATH: Brand: ENDOWRIST

## (undated) DEVICE — AIR/WATER CLEANING ADAPTER FOR OLYMPUS® GI ENDOSCOPE: Brand: BULLDOG®

## (undated) DEVICE — SUTURE VCRL SZ 3-0 L36IN ABSRB UD L36MM CT-1 1/2 CIR J944H

## (undated) DEVICE — STAPLER 30 RELOAD WHITE: Brand: ENDOWRIST

## (undated) DEVICE — CYSTO/BLADDER IRRIGATION SET, REGULATING CLAMP

## (undated) DEVICE — MEDIUM-LARGE CLIP APPLIER: Brand: ENDOWRIST

## (undated) DEVICE — CANNULATING SPHINCTEROTOME: Brand: AUTOTOME™ RX 39

## (undated) DEVICE — BLADELESS OBTURATOR: Brand: WECK VISTA

## (undated) DEVICE — SEAL

## (undated) DEVICE — MERCY FAIRFIELD TURNOVER KIT: Brand: MEDLINE INDUSTRIES, INC.

## (undated) DEVICE — Z INACTIVE USE 2641839 CLIP INT M L POLYMER LOK LIG HEM O LOK

## (undated) DEVICE — BAG SPEC RETRIEVALXL C2000ML MOUTH 14MM L27CM SHFT 15MM NYL

## (undated) DEVICE — TRI-LUMEN FILTERED TUBE SET WITH ACTIVATED CHARCOAL FILTER: Brand: AIRSEAL

## (undated) DEVICE — SYRINGE, LUER LOCK, 10ML: Brand: MEDLINE

## (undated) DEVICE — CYTOMAX II DOUBLE LUMEN CYTOLOGY BRUSH: Brand: CYTOMAX II

## (undated) DEVICE — SYRINGE MED 10ML SLIP TIP BLNT FILL AND LUERLOCK DISP

## (undated) DEVICE — SYRINGE MED 50ML LUERLOCK TIP

## (undated) DEVICE — VALVE SUCTION AIR H2O SET ORCA POD + DISP

## (undated) DEVICE — RETRIEVAL BALLOON CATHETER: Brand: EXTRACTOR™ PRO RX

## (undated) DEVICE — BLOCK BITE 48FR DENT RIM CAREGUARD

## (undated) DEVICE — CANNULATING SPHINCTEROTOME: Brand: TRUETOME 39

## (undated) DEVICE — SOLUTION IRRIGATION STRL H2O 1000 ML UROMATIC CONTAINER

## (undated) DEVICE — SUTURE MCRYL SZ 4-0 L27IN ABSRB UD L19MM PS-2 1/2 CIR PRIM Y426H

## (undated) DEVICE — 3M™ STERI-DRAPE™ INCISE DRAPE 1050 (60CM X 45CM): Brand: STERI-DRAPE™

## (undated) DEVICE — PENCIL ES ULT VAC W TELSCP NOSE EZ CLN BLDE 10FT

## (undated) DEVICE — TISSUE RETRIEVAL SYSTEM: Brand: INZII RETRIEVAL SYSTEM

## (undated) DEVICE — COVER,MAYO STAND,STERILE: Brand: MEDLINE

## (undated) DEVICE — ARM DRAPE

## (undated) DEVICE — COVER LT HNDL BLU PLAS

## (undated) DEVICE — BAG DRAINAGE NS

## (undated) DEVICE — FORCEPS BX L240CM WRK CHN 2.8MM STD CAP W/ NDL MIC MESH

## (undated) DEVICE — SINGLE USE DISTAL COVER MAJ-2315: Brand: SINGLE USE DISTAL COVER

## (undated) DEVICE — STERILE LATEX POWDER FREE SURGICAL GLOVES WITH HYDROGEL COATING: Brand: PROTEXIS

## (undated) DEVICE — ZIMMON PANCREATIC STENT
Type: IMPLANTABLE DEVICE | Status: NON-FUNCTIONAL
Brand: ZIMMON